# Patient Record
Sex: FEMALE | Race: WHITE | NOT HISPANIC OR LATINO | Employment: OTHER | URBAN - METROPOLITAN AREA
[De-identification: names, ages, dates, MRNs, and addresses within clinical notes are randomized per-mention and may not be internally consistent; named-entity substitution may affect disease eponyms.]

---

## 2017-02-24 ENCOUNTER — COMMUNICATION - HEALTHEAST (OUTPATIENT)
Dept: NURSING | Facility: CLINIC | Age: 82
End: 2017-02-24

## 2017-04-24 ENCOUNTER — COMMUNICATION - HEALTHEAST (OUTPATIENT)
Dept: NURSING | Facility: CLINIC | Age: 82
End: 2017-04-24

## 2017-05-11 ENCOUNTER — COMMUNICATION - HEALTHEAST (OUTPATIENT)
Dept: SCHEDULING | Facility: CLINIC | Age: 82
End: 2017-05-11

## 2017-05-12 ENCOUNTER — OFFICE VISIT - HEALTHEAST (OUTPATIENT)
Dept: FAMILY MEDICINE | Facility: CLINIC | Age: 82
End: 2017-05-12

## 2017-05-12 DIAGNOSIS — F32.A DEPRESSION: ICD-10-CM

## 2017-05-12 DIAGNOSIS — I10 ESSENTIAL HYPERTENSION WITH GOAL BLOOD PRESSURE LESS THAN 140/90: ICD-10-CM

## 2017-05-12 ASSESSMENT — MIFFLIN-ST. JEOR: SCORE: 1112.15

## 2017-06-05 ENCOUNTER — COMMUNICATION - HEALTHEAST (OUTPATIENT)
Dept: NURSING | Facility: CLINIC | Age: 82
End: 2017-06-05

## 2017-06-13 ENCOUNTER — COMMUNICATION - HEALTHEAST (OUTPATIENT)
Dept: FAMILY MEDICINE | Facility: CLINIC | Age: 82
End: 2017-06-13

## 2017-06-26 ENCOUNTER — COMMUNICATION - HEALTHEAST (OUTPATIENT)
Dept: FAMILY MEDICINE | Facility: CLINIC | Age: 82
End: 2017-06-26

## 2017-06-26 ENCOUNTER — RECORDS - HEALTHEAST (OUTPATIENT)
Dept: ADMINISTRATIVE | Facility: OTHER | Age: 82
End: 2017-06-26

## 2017-06-26 DIAGNOSIS — G47.00 INSOMNIA, UNSPECIFIED: ICD-10-CM

## 2017-07-04 ENCOUNTER — COMMUNICATION - HEALTHEAST (OUTPATIENT)
Dept: SCHEDULING | Facility: CLINIC | Age: 82
End: 2017-07-04

## 2017-07-04 DIAGNOSIS — I10 HTN (HYPERTENSION): ICD-10-CM

## 2017-07-19 ENCOUNTER — COMMUNICATION - HEALTHEAST (OUTPATIENT)
Dept: NURSING | Facility: CLINIC | Age: 82
End: 2017-07-19

## 2017-07-31 ENCOUNTER — RECORDS - HEALTHEAST (OUTPATIENT)
Dept: ADMINISTRATIVE | Facility: OTHER | Age: 82
End: 2017-07-31

## 2017-09-06 ENCOUNTER — COMMUNICATION - HEALTHEAST (OUTPATIENT)
Dept: NURSING | Facility: CLINIC | Age: 82
End: 2017-09-06

## 2017-09-14 ENCOUNTER — COMMUNICATION - HEALTHEAST (OUTPATIENT)
Dept: FAMILY MEDICINE | Facility: CLINIC | Age: 82
End: 2017-09-14

## 2017-09-14 DIAGNOSIS — G47.00 INSOMNIA, UNSPECIFIED: ICD-10-CM

## 2017-09-24 ENCOUNTER — COMMUNICATION - HEALTHEAST (OUTPATIENT)
Dept: FAMILY MEDICINE | Facility: CLINIC | Age: 82
End: 2017-09-24

## 2017-09-24 DIAGNOSIS — K21.00 REFLUX ESOPHAGITIS: ICD-10-CM

## 2017-09-28 ENCOUNTER — COMMUNICATION - HEALTHEAST (OUTPATIENT)
Dept: NURSING | Facility: CLINIC | Age: 82
End: 2017-09-28

## 2017-10-09 ENCOUNTER — OFFICE VISIT - HEALTHEAST (OUTPATIENT)
Dept: FAMILY MEDICINE | Facility: CLINIC | Age: 82
End: 2017-10-09

## 2017-10-09 DIAGNOSIS — K21.00 REFLUX ESOPHAGITIS: ICD-10-CM

## 2017-10-09 DIAGNOSIS — M81.0 OSTEOPOROSIS: ICD-10-CM

## 2017-10-09 DIAGNOSIS — F33.1 MAJOR DEPRESSIVE DISORDER, RECURRENT EPISODE, MODERATE (H): ICD-10-CM

## 2017-10-09 DIAGNOSIS — C50.919 MALIGNANT NEOPLASM OF FEMALE BREAST (H): ICD-10-CM

## 2017-10-09 DIAGNOSIS — F51.01 PRIMARY INSOMNIA: ICD-10-CM

## 2017-10-09 DIAGNOSIS — H61.20 CERUMEN IMPACTION: ICD-10-CM

## 2017-10-09 DIAGNOSIS — Z00.00 ROUTINE GENERAL MEDICAL EXAMINATION AT A HEALTH CARE FACILITY: ICD-10-CM

## 2017-10-09 DIAGNOSIS — I10 ESSENTIAL HYPERTENSION: ICD-10-CM

## 2017-10-09 LAB
CHOLEST SERPL-MCNC: 184 MG/DL
FASTING STATUS PATIENT QL REPORTED: YES
HDLC SERPL-MCNC: 32 MG/DL
LDLC SERPL CALC-MCNC: 67 MG/DL
LDLC SERPL CALC-MCNC: ABNORMAL MG/DL
TRIGL SERPL-MCNC: 458 MG/DL

## 2017-10-09 ASSESSMENT — MIFFLIN-ST. JEOR: SCORE: 1098.54

## 2017-10-23 ENCOUNTER — COMMUNICATION - HEALTHEAST (OUTPATIENT)
Dept: NURSING | Facility: CLINIC | Age: 82
End: 2017-10-23

## 2017-11-20 ENCOUNTER — COMMUNICATION - HEALTHEAST (OUTPATIENT)
Dept: FAMILY MEDICINE | Facility: CLINIC | Age: 82
End: 2017-11-20

## 2017-11-27 ENCOUNTER — COMMUNICATION - HEALTHEAST (OUTPATIENT)
Dept: NURSING | Facility: CLINIC | Age: 82
End: 2017-11-27

## 2017-11-28 ENCOUNTER — AMBULATORY - HEALTHEAST (OUTPATIENT)
Dept: CARE COORDINATION | Facility: CLINIC | Age: 82
End: 2017-11-28

## 2017-11-28 ENCOUNTER — COMMUNICATION - HEALTHEAST (OUTPATIENT)
Dept: TELEHEALTH | Facility: CLINIC | Age: 82
End: 2017-11-28

## 2017-11-28 ENCOUNTER — COMMUNICATION - HEALTHEAST (OUTPATIENT)
Dept: CARE COORDINATION | Facility: CLINIC | Age: 82
End: 2017-11-28

## 2017-12-01 ENCOUNTER — COMMUNICATION - HEALTHEAST (OUTPATIENT)
Dept: FAMILY MEDICINE | Facility: CLINIC | Age: 82
End: 2017-12-01

## 2017-12-05 ENCOUNTER — COMMUNICATION - HEALTHEAST (OUTPATIENT)
Dept: FAMILY MEDICINE | Facility: CLINIC | Age: 82
End: 2017-12-05

## 2017-12-12 ENCOUNTER — COMMUNICATION - HEALTHEAST (OUTPATIENT)
Dept: FAMILY MEDICINE | Facility: CLINIC | Age: 82
End: 2017-12-12

## 2017-12-14 ENCOUNTER — RECORDS - HEALTHEAST (OUTPATIENT)
Dept: ADMINISTRATIVE | Facility: OTHER | Age: 82
End: 2017-12-14

## 2017-12-14 ENCOUNTER — COMMUNICATION - HEALTHEAST (OUTPATIENT)
Dept: FAMILY MEDICINE | Facility: CLINIC | Age: 82
End: 2017-12-14

## 2017-12-27 ENCOUNTER — COMMUNICATION - HEALTHEAST (OUTPATIENT)
Dept: SCHEDULING | Facility: CLINIC | Age: 82
End: 2017-12-27

## 2017-12-27 ENCOUNTER — HOSPITAL ENCOUNTER (EMERGENCY)
Facility: CLINIC | Age: 82
Discharge: HOME | End: 2017-12-27
Attending: EMERGENCY MEDICINE | Admitting: EMERGENCY MEDICINE
Payer: COMMERCIAL

## 2017-12-27 ENCOUNTER — RECORDS - HEALTHEAST (OUTPATIENT)
Dept: ADMINISTRATIVE | Facility: OTHER | Age: 82
End: 2017-12-27

## 2017-12-27 ENCOUNTER — APPOINTMENT (OUTPATIENT)
Dept: GENERAL RADIOLOGY | Facility: CLINIC | Age: 82
End: 2017-12-27
Attending: EMERGENCY MEDICINE
Payer: COMMERCIAL

## 2017-12-27 VITALS
SYSTOLIC BLOOD PRESSURE: 153 MMHG | RESPIRATION RATE: 20 BRPM | DIASTOLIC BLOOD PRESSURE: 71 MMHG | HEIGHT: 64 IN | TEMPERATURE: 98 F | OXYGEN SATURATION: 96 % | WEIGHT: 165 LBS | BODY MASS INDEX: 28.17 KG/M2

## 2017-12-27 DIAGNOSIS — J20.9 ACUTE BRONCHITIS, UNSPECIFIED ORGANISM: ICD-10-CM

## 2017-12-27 LAB
ALBUMIN SERPL-MCNC: 3.3 G/DL (ref 3.4–5)
ALP SERPL-CCNC: 62 U/L (ref 40–150)
ALT SERPL W P-5'-P-CCNC: 13 U/L (ref 0–50)
ANION GAP SERPL CALCULATED.3IONS-SCNC: 5 MMOL/L (ref 3–14)
AST SERPL W P-5'-P-CCNC: 22 U/L (ref 0–45)
BASE EXCESS BLDV CALC-SCNC: 4.5 MMOL/L
BASOPHILS # BLD AUTO: 0 10E9/L (ref 0–0.2)
BASOPHILS NFR BLD AUTO: 0.3 %
BILIRUB SERPL-MCNC: 0.6 MG/DL (ref 0.2–1.3)
BUN SERPL-MCNC: 13 MG/DL (ref 7–30)
CALCIUM SERPL-MCNC: 8.3 MG/DL (ref 8.5–10.1)
CHLORIDE SERPL-SCNC: 107 MMOL/L (ref 94–109)
CO2 SERPL-SCNC: 28 MMOL/L (ref 20–32)
CREAT SERPL-MCNC: 0.49 MG/DL (ref 0.52–1.04)
DIFFERENTIAL METHOD BLD: ABNORMAL
EOSINOPHIL # BLD AUTO: 0 10E9/L (ref 0–0.7)
EOSINOPHIL NFR BLD AUTO: 0.6 %
ERYTHROCYTE [DISTWIDTH] IN BLOOD BY AUTOMATED COUNT: 13.2 % (ref 10–15)
FLUAV+FLUBV AG SPEC QL: NEGATIVE
FLUAV+FLUBV AG SPEC QL: NEGATIVE
GFR SERPL CREATININE-BSD FRML MDRD: >90 ML/MIN/1.7M2
GLUCOSE SERPL-MCNC: 97 MG/DL (ref 70–99)
HCO3 BLDV-SCNC: 29 MMOL/L (ref 21–28)
HCT VFR BLD AUTO: 40.9 % (ref 35–47)
HGB BLD-MCNC: 13.4 G/DL (ref 11.7–15.7)
IMM GRANULOCYTES # BLD: 0 10E9/L (ref 0–0.4)
IMM GRANULOCYTES NFR BLD: 0.3 %
LACTATE BLD-SCNC: 0.9 MMOL/L (ref 0.7–2)
LYMPHOCYTES # BLD AUTO: 1.5 10E9/L (ref 0.8–5.3)
LYMPHOCYTES NFR BLD AUTO: 42.4 %
MCH RBC QN AUTO: 31 PG (ref 26.5–33)
MCHC RBC AUTO-ENTMCNC: 32.8 G/DL (ref 31.5–36.5)
MCV RBC AUTO: 95 FL (ref 78–100)
MONOCYTES # BLD AUTO: 0.6 10E9/L (ref 0–1.3)
MONOCYTES NFR BLD AUTO: 17.9 %
NEUTROPHILS # BLD AUTO: 1.3 10E9/L (ref 1.6–8.3)
NEUTROPHILS NFR BLD AUTO: 38.5 %
NT-PROBNP SERPL-MCNC: 186 PG/ML (ref 0–1800)
O2/TOTAL GAS SETTING VFR VENT: ABNORMAL %
PCO2 BLDV: 41 MM HG (ref 40–50)
PH BLDV: 7.45 PH (ref 7.32–7.43)
PLATELET # BLD AUTO: 119 10E9/L (ref 150–450)
PO2 BLDV: 35 MM HG (ref 25–47)
POTASSIUM SERPL-SCNC: 3.8 MMOL/L (ref 3.4–5.3)
PROT SERPL-MCNC: 6.6 G/DL (ref 6.8–8.8)
RBC # BLD AUTO: 4.32 10E12/L (ref 3.8–5.2)
SODIUM SERPL-SCNC: 140 MMOL/L (ref 133–144)
SPECIMEN SOURCE: NORMAL
TROPONIN I SERPL-MCNC: <0.015 UG/L (ref 0–0.04)
WBC # BLD AUTO: 3.5 10E9/L (ref 4–11)

## 2017-12-27 PROCEDURE — 80053 COMPREHEN METABOLIC PANEL: CPT | Performed by: EMERGENCY MEDICINE

## 2017-12-27 PROCEDURE — 96360 HYDRATION IV INFUSION INIT: CPT | Performed by: EMERGENCY MEDICINE

## 2017-12-27 PROCEDURE — 83605 ASSAY OF LACTIC ACID: CPT | Performed by: EMERGENCY MEDICINE

## 2017-12-27 PROCEDURE — 25000125 ZZHC RX 250: Performed by: EMERGENCY MEDICINE

## 2017-12-27 PROCEDURE — 93005 ELECTROCARDIOGRAM TRACING: CPT | Performed by: EMERGENCY MEDICINE

## 2017-12-27 PROCEDURE — 99285 EMERGENCY DEPT VISIT HI MDM: CPT | Mod: 25 | Performed by: EMERGENCY MEDICINE

## 2017-12-27 PROCEDURE — 87804 INFLUENZA ASSAY W/OPTIC: CPT | Mod: 91 | Performed by: EMERGENCY MEDICINE

## 2017-12-27 PROCEDURE — 82803 BLOOD GASES ANY COMBINATION: CPT | Performed by: EMERGENCY MEDICINE

## 2017-12-27 PROCEDURE — 71020 XR CHEST 2 VW: CPT

## 2017-12-27 PROCEDURE — 25000128 H RX IP 250 OP 636: Performed by: EMERGENCY MEDICINE

## 2017-12-27 PROCEDURE — 85025 COMPLETE CBC W/AUTO DIFF WBC: CPT | Performed by: EMERGENCY MEDICINE

## 2017-12-27 PROCEDURE — 93010 ELECTROCARDIOGRAM REPORT: CPT | Mod: Z6 | Performed by: EMERGENCY MEDICINE

## 2017-12-27 PROCEDURE — 83880 ASSAY OF NATRIURETIC PEPTIDE: CPT | Performed by: EMERGENCY MEDICINE

## 2017-12-27 PROCEDURE — 99284 EMERGENCY DEPT VISIT MOD MDM: CPT | Mod: 25 | Performed by: EMERGENCY MEDICINE

## 2017-12-27 PROCEDURE — 84484 ASSAY OF TROPONIN QUANT: CPT | Performed by: EMERGENCY MEDICINE

## 2017-12-27 PROCEDURE — 96361 HYDRATE IV INFUSION ADD-ON: CPT | Performed by: EMERGENCY MEDICINE

## 2017-12-27 RX ORDER — AZITHROMYCIN 250 MG/1
TABLET, FILM COATED ORAL
Qty: 6 TABLET | Refills: 0 | Status: SHIPPED | OUTPATIENT
Start: 2017-12-27 | End: 2018-01-01

## 2017-12-27 RX ORDER — SODIUM CHLORIDE, SODIUM LACTATE, POTASSIUM CHLORIDE, CALCIUM CHLORIDE 600; 310; 30; 20 MG/100ML; MG/100ML; MG/100ML; MG/100ML
1000 INJECTION, SOLUTION INTRAVENOUS CONTINUOUS
Status: DISCONTINUED | OUTPATIENT
Start: 2017-12-27 | End: 2017-12-27 | Stop reason: HOSPADM

## 2017-12-27 RX ORDER — ALBUTEROL SULFATE 90 UG/1
2 AEROSOL, METERED RESPIRATORY (INHALATION) EVERY 4 HOURS PRN
Qty: 1 INHALER | Refills: 0 | Status: SHIPPED | OUTPATIENT
Start: 2017-12-27 | End: 2018-11-26

## 2017-12-27 RX ORDER — IPRATROPIUM BROMIDE AND ALBUTEROL SULFATE 2.5; .5 MG/3ML; MG/3ML
3 SOLUTION RESPIRATORY (INHALATION) ONCE
Status: COMPLETED | OUTPATIENT
Start: 2017-12-27 | End: 2017-12-27

## 2017-12-27 RX ORDER — PREDNISONE 20 MG/1
40 TABLET ORAL DAILY
Qty: 10 TABLET | Refills: 0 | Status: SHIPPED | OUTPATIENT
Start: 2017-12-27 | End: 2018-01-01

## 2017-12-27 RX ADMIN — SODIUM CHLORIDE, POTASSIUM CHLORIDE, SODIUM LACTATE AND CALCIUM CHLORIDE 500 ML: 600; 310; 30; 20 INJECTION, SOLUTION INTRAVENOUS at 11:36

## 2017-12-27 RX ADMIN — IPRATROPIUM BROMIDE AND ALBUTEROL SULFATE 3 ML: .5; 3 SOLUTION RESPIRATORY (INHALATION) at 15:27

## 2017-12-27 RX ADMIN — SODIUM CHLORIDE, POTASSIUM CHLORIDE, SODIUM LACTATE AND CALCIUM CHLORIDE 1000 ML: 600; 310; 30; 20 INJECTION, SOLUTION INTRAVENOUS at 12:42

## 2017-12-27 NOTE — ED AVS SNAPSHOT
[facility] Emergency Department    [address] [address]    Phone:  [phone]    Fax:  [phone]                                       Nimo Concepcion   MRN: [id number]    Department:  [facility] Emergency Department   Date of Visit:  12/27/2017           After Visit Summary Signature Page     I have received my discharge instructions, and my questions have been answered. I have discussed any challenges I see with this plan with the nurse or doctor.    ..........................................................................................................................................  Patient/Patient Representative Signature      ..........................................................................................................................................  Patient Representative Print Name and Relationship to Patient    ..................................................               ................................................  Date                                            Time    ..........................................................................................................................................  Reviewed by Signature/Title    ...................................................              ..............................................  Date                                                            Time

## 2017-12-27 NOTE — DISCHARGE INSTRUCTIONS
Bronchitis, Antibiotic Treatment (Adult)    Bronchitis is an infection of the air passages (bronchial tubes) in your lungs. It often occurs when you have a cold. This illness is contagious during the first few days and is spread through the air by coughing and sneezing, or by direct contact (touching the sick person and then touching your own eyes, nose, or mouth).  Symptoms of bronchitis include cough with mucus (phlegm) and low-grade fever. Bronchitis usually lasts 7 to 14 days. Mild cases can be treated with simple home remedies. More severe infection is treated with an antibiotic.  Home care  Follow these guidelines when caring for yourself at home:    If your symptoms are severe, rest at home for the first 2 to 3 days. When you go back to your usual activities, don't let yourself get too tired.    Do not smoke. Also avoid being exposed to secondhand smoke.    You may use over-the-counter medicines to control fever or pain, unless another medicine was prescribed. (Note: If you have chronic liver or kidney disease or have ever had a stomach ulcer or gastrointestinal bleeding, talk with your healthcare provider before using these medicines. Also talk to your provider if you are taking medicine to prevent blood clots.) Aspirin should never be given to anyone younger than 18 years of age who is ill with a viral infection or fever. It may cause severe liver or brain damage.    Your appetite may be poor, so a light diet is fine. Avoid dehydration by drinking 6 to 8 glasses of fluids per day (such as water, soft drinks, sports drinks, juices, tea, or soup). Extra fluids will help loosen secretions in the nose and lungs.    Over-the-counter cough, cold, and sore-throat medicines will not shorten the length of the illness, but they may be helpful to reduce symptoms. (Note: Do not use decongestants if you have high blood pressure.)    Finish all antibiotic medicine. Do this even if you are feeling better after only a  few days.  Follow-up care  Follow up with your healthcare provider, or as advised. If you had an X-ray or ECG (electrocardiogram), a specialist will review it. You will be notified of any new findings that may affect your care.  Note: If you are age 65 or older, or if you have a chronic lung disease or condition that affects your immune system, or you smoke, talk to your healthcare provider about having pneumococcal vaccinations and a yearly influenza vaccination (flu shot).  When to seek medical advice  Call your healthcare provider right away if any of these occur:    Fever of 100.4 F (38 C) or higher    Coughing up increased amounts of colored sputum    Weakness, drowsiness, headache, facial pain, ear pain, or a stiff neck  Call 911, or get immediate medical care  Contact emergency services right away if any of these occur.    Coughing up blood    Worsening weakness, drowsiness, headache, or stiff neck    Trouble breathing, wheezing, or pain with breathing  Date Last Reviewed: 9/13/2015 2000-2017 [employer]. [address] [place] [address]. All rights reserved. This information is not intended as a substitute for professional medical care. Always follow your healthcare professional's instructions.          Bronchitis with Wheezing (Viral or Bacterial: Adult)    Bronchitis is an infection of the air passages. It often occurs during a cold and is usually caused by a virus. Symptoms include cough with mucus (phlegm) and low-grade fever. This illness is contagious during the first few days and is spread through the air by coughing and sneezing, or by direct contact (touching the sick person and then touching your own eyes, nose, or mouth).  If there is a lot of inflammation, air flow is restricted. The air passages may also go into spasm, especially if you have asthma. This causes wheezing and difficulty breathing even in people who do not have asthma.  Bronchitis usually lasts 7 to 14  days. The wheezing should improve with treatment during the first week. An inhaler is often prescribed to relax the air passages and stop wheezing. Antibiotics will be prescribed if your doctor thinks there is also a secondary bacterial infection.  Home care    If symptoms are severe, rest at home for the first 2 to 3 days. When you go back to your usual activities, don't let yourself get too tired.    Do not smoke. Also avoid being exposed to secondhand smoke.    You may use over-the-counter medicine to control fever or pain, unless another medicine was prescribed. Note: If you have chronic liver or kidney disease or have ever had a stomach ulcer or gastrointestinal bleeding, talk with your healthcare provider before using these medicines. Also talk to your provider if you are taking medicine to prevent blood clots.) Aspirin should never be given to anyone younger than 18 years of age who is ill with a viral infection or fever. It may cause severe liver or brain damage.    Your appetite may be poor, so a light diet is fine. Avoid dehydration by drinking 6 to 8 glasses of fluids per day (such as water, soft drinks, sports drinks, juices, tea, or soup). Extra fluids will help loosen secretions in the nose and lungs.    Over-the-counter cough, cold, and sore-throat medicines will not shorten the length of the illness, but they may be helpful to reduce symptoms. (Note: Do not use decongestants if you have high blood pressure.)    If you were given an inhaler, use it exactly as directed. If you need to use it more often than prescribed, your condition may be worsening. If this happens, contact your healthcare provider.    If prescribed, finish all antibiotic medicine, even if you are feeling better after only a few days.  Follow-up care  Follow up with your healthcare provider, or as advised. If you had an X-ray or ECG (electrocardiogram), a specialist will review it. You will be notified of any new findings that may  affect your care.  Note: If you are age 65 or older, or if you have a chronic lung disease or condition that affects your immune system, or you smoke, talk to your healthcare provider about having a pneumococcal vaccinations and a yearly influenza vaccination (flu shot).  When to seek medical advice  Call your healthcare provider right away if any of these occur:    Fever of 100.4 F (38 C) or higher    Coughing up increasing amounts of colored sputum    Weakness, drowsiness, headache, facial pain, ear pain, or a stiff neck  Call 911, or get immediate medical care  Contact emergency services right away if any of these occur.    Coughing up blood    Worsening weakness, drowsiness, headache, or stiff neck    Increased wheezing not helped with medication, shortness of breath, or pain with breathing  Date Last Reviewed: 9/13/2015 2000-2017 [employer]. [address] [place] [id number]. All rights reserved. This information is not intended as a substitute for professional medical care. Always follow your healthcare professional's instructions.

## 2017-12-27 NOTE — ED AVS SNAPSHOT
[facility] Emergency Department    [address]    [facility] [address]    Phone:  [phone]    Fax:  [phone]                                       Nimo Concepcion   MRN: [id number]    Department:  [facility] Emergency Department   Date of Visit:  12/27/2017           Patient Information     Date Of Birth          4/11/1930        Your diagnoses for this visit were:     Acute bronchitis, unspecified organism        You were seen by Christopher Palafox MD.      Follow-up Information     Follow up with Katarina Colon    Specialty:  Family Practice    Why:  3-5 days    Contact information:    [facility]  [address] [facility] [address]  [facility] [address]  [phone]          Discharge Instructions         Bronchitis, Antibiotic Treatment (Adult)    Bronchitis is an infection of the air passages (bronchial tubes) in your lungs. It often occurs when you have a cold. This illness is contagious during the first few days and is spread through the air by coughing and sneezing, or by direct contact (touching the sick person and then touching your own eyes, nose, or mouth).  Symptoms of bronchitis include cough with mucus (phlegm) and low-grade fever. Bronchitis usually lasts 7 to 14 days. Mild cases can be treated with simple home remedies. More severe infection is treated with an antibiotic.  Home care  Follow these guidelines when caring for yourself at home:    If your symptoms are severe, rest at home for the first 2 to 3 days. When you go back to your usual activities, don't let yourself get too tired.    Do not smoke. Also avoid being exposed to secondhand smoke.    You may use over-the-counter medicines to control fever or pain, unless another medicine was prescribed. (Note: If you have chronic liver or kidney disease or have ever had a stomach ulcer or gastrointestinal bleeding, talk with your healthcare provider before using these medicines. Also talk to your provider if you are taking medicine  to prevent blood clots.) Aspirin should never be given to anyone younger than 18 years of age who is ill with a viral infection or fever. It may cause severe liver or brain damage.    Your appetite may be poor, so a light diet is fine. Avoid dehydration by drinking 6 to 8 glasses of fluids per day (such as water, soft drinks, sports drinks, juices, tea, or soup). Extra fluids will help loosen secretions in the nose and lungs.    Over-the-counter cough, cold, and sore-throat medicines will not shorten the length of the illness, but they may be helpful to reduce symptoms. (Note: Do not use decongestants if you have high blood pressure.)    Finish all antibiotic medicine. Do this even if you are feeling better after only a few days.  Follow-up care  Follow up with your healthcare provider, or as advised. If you had an X-ray or ECG (electrocardiogram), a specialist will review it. You will be notified of any new findings that may affect your care.  Note: If you are age 65 or older, or if you have a chronic lung disease or condition that affects your immune system, or you smoke, talk to your healthcare provider about having pneumococcal vaccinations and a yearly influenza vaccination (flu shot).  When to seek medical advice  Call your healthcare provider right away if any of these occur:    Fever of 100.4 F (38 C) or higher    Coughing up increased amounts of colored sputum    Weakness, drowsiness, headache, facial pain, ear pain, or a stiff neck  Call 911, or get immediate medical care  Contact emergency services right away if any of these occur.    Coughing up blood    Worsening weakness, drowsiness, headache, or stiff neck    Trouble breathing, wheezing, or pain with breathing  Date Last Reviewed: 9/13/2015 2000-2017 [employer]. [address] [place] [address], [place] [address]. All rights reserved. This information is not intended as a substitute for professional medical care. Always follow your healthcare  professional's instructions.          Bronchitis with Wheezing (Viral or Bacterial: Adult)    Bronchitis is an infection of the air passages. It often occurs during a cold and is usually caused by a virus. Symptoms include cough with mucus (phlegm) and low-grade fever. This illness is contagious during the first few days and is spread through the air by coughing and sneezing, or by direct contact (touching the sick person and then touching your own eyes, nose, or mouth).  If there is a lot of inflammation, air flow is restricted. The air passages may also go into spasm, especially if you have asthma. This causes wheezing and difficulty breathing even in people who do not have asthma.  Bronchitis usually lasts 7 to 14 days. The wheezing should improve with treatment during the first week. An inhaler is often prescribed to relax the air passages and stop wheezing. Antibiotics will be prescribed if your doctor thinks there is also a secondary bacterial infection.  Home care    If symptoms are severe, rest at home for the first 2 to 3 days. When you go back to your usual activities, don't let yourself get too tired.    Do not smoke. Also avoid being exposed to secondhand smoke.    You may use over-the-counter medicine to control fever or pain, unless another medicine was prescribed. Note: If you have chronic liver or kidney disease or have ever had a stomach ulcer or gastrointestinal bleeding, talk with your healthcare provider before using these medicines. Also talk to your provider if you are taking medicine to prevent blood clots.) Aspirin should never be given to anyone younger than 18 years of age who is ill with a viral infection or fever. It may cause severe liver or brain damage.    Your appetite may be poor, so a light diet is fine. Avoid dehydration by drinking 6 to 8 glasses of fluids per day (such as water, soft drinks, sports drinks, juices, tea, or soup). Extra fluids will help loosen secretions in the  nose and lungs.    Over-the-counter cough, cold, and sore-throat medicines will not shorten the length of the illness, but they may be helpful to reduce symptoms. (Note: Do not use decongestants if you have high blood pressure.)    If you were given an inhaler, use it exactly as directed. If you need to use it more often than prescribed, your condition may be worsening. If this happens, contact your healthcare provider.    If prescribed, finish all antibiotic medicine, even if you are feeling better after only a few days.  Follow-up care  Follow up with your healthcare provider, or as advised. If you had an X-ray or ECG (electrocardiogram), a specialist will review it. You will be notified of any new findings that may affect your care.  Note: If you are age 65 or older, or if you have a chronic lung disease or condition that affects your immune system, or you smoke, talk to your healthcare provider about having a pneumococcal vaccinations and a yearly influenza vaccination (flu shot).  When to seek medical advice  Call your healthcare provider right away if any of these occur:    Fever of 100.4 F (38 C) or higher    Coughing up increasing amounts of colored sputum    Weakness, drowsiness, headache, facial pain, ear pain, or a stiff neck  Call 911, or get immediate medical care  Contact emergency services right away if any of these occur.    Coughing up blood    Worsening weakness, drowsiness, headache, or stiff neck    Increased wheezing not helped with medication, shortness of breath, or pain with breathing  Date Last Reviewed: 9/13/2015 2000-2017 [employer]. [address] [place] [address], [place] [address]. All rights reserved. This information is not intended as a substitute for professional medical care. Always follow your healthcare professional's instructions.          24 Hour Appointment Hotline       To make an appointment at any [facility], call [phone] ([phone]). If you don't  have a family doctor or clinic, we will help you find one. [facility] are conveniently located to serve the needs of you and your family.             Review of your medicines      START taking        Dose / Directions Last dose taken    albuterol 108 (90 BASE) MCG/ACT Inhaler   Commonly known as:  PROAIR HFA/PROVENTIL HFA/VENTOLIN HFA   Dose:  2 puff   Quantity:  1 Inhaler        Inhale 2 puffs into the lungs every 4 hours as needed for shortness of breath / dyspnea or wheezing   Refills:  0        azithromycin 250 MG tablet   Commonly known as:  ZITHROMAX Z-JOHNNY   Quantity:  6 tablet        Two tablets on the first day, then one tablet daily for the next 4 days   Refills:  0        predniSONE 20 MG tablet   Commonly known as:  DELTASONE   Dose:  40 mg   Quantity:  10 tablet        Take 2 tablets (40 mg) by mouth daily for 5 days   Refills:  0          Our records show that you are taking the medicines listed below. If these are incorrect, please call your family doctor or clinic.        Dose / Directions Last dose taken    BUPROPION HBR ER PO   Dose:  150 mg        Take 150 mg by mouth daily   Refills:  0        CITALOPRAM HYDROBROMIDE PO   Dose:  40 mg        Take 40 mg by mouth daily   Refills:  0        POTASSIUM CHLORIDE ER PO   Dose:  30 mEq        Take 30 mEq by mouth daily Three tabs daily (total dose 30mEq)   Refills:  0        PROTONIX PO   Dose:  40 mg        Take 40 mg by mouth every morning (before breakfast)   Refills:  0        TRAZODONE HCL PO   Dose:  100-200 mg        Take 100-200 mg by mouth At Bedtime   Refills:  0        VERAPAMIL HCL ER PO 24HR CAPSULE   Dose:  100 mg        Take 100 mg by mouth daily   Refills:  0                Prescriptions were sent or printed at these locations (3 Prescriptions)                   [facility] [place], [place] - [address]   [address] [facility] [address]    Telephone:  [phone]   Fax:  [phone]   Hours:                   E-Prescribed (3 of 3)         predniSONE (DELTASONE) 20 MG tablet               azithromycin (ZITHROMAX Z-JOHNNY) 250 MG tablet               albuterol (PROAIR HFA/PROVENTIL HFA/VENTOLIN HFA) 108 (90 BASE) MCG/ACT Inhaler                Procedures and tests performed during your visit     Blood gas venous    CBC with platelets differential    Comprehensive metabolic panel    EKG 12-lead, tracing only    Influenza A/B antigen    Lactic acid whole blood    Nt probnp inpatient (BNP)    Peripheral IV catheter    Troponin I    XR Chest 2 Views      Orders Needing Specimen Collection     None      Pending Results     Date and Time Order Name Status Description    12/27/2017 1120 XR Chest 2 Views Preliminary             Pending Culture Results     No orders found from 12/25/2017 to 12/28/2017.            Pending Results Instructions     If you had any lab results that were not finalized at the time of your Discharge, you can call the ED Lab Result RN at [phone]. You will be contacted by this team for any positive Lab results or changes in treatment. The nurses are available 7 days a week from 10A to 6:30P.  You can leave a message 24 hours per day and they will return your call.        Test Results From Your Hospital Stay        12/27/2017 11:46 AM      Component Results     Component Value Ref Range & Units Status    WBC 3.5 (L) 4.0 - 11.0 10e9/L Final    RBC Count 4.32 3.8 - 5.2 10e12/L Final    Hemoglobin 13.4 11.7 - 15.7 g/dL Final    Hematocrit 40.9 35.0 - 47.0 % Final    MCV 95 78 - 100 fl Final    MCH 31.0 26.5 - 33.0 pg Final    MCHC 32.8 31.5 - 36.5 g/dL Final    RDW 13.2 10.0 - 15.0 % Final    Platelet Count 119 (L) 150 - 450 10e9/L Final    Diff Method Automated Method  Final    % Neutrophils 38.5 % Final    % Lymphocytes 42.4 % Final    % Monocytes 17.9 % Final    % Eosinophils 0.6 % Final    % Basophils 0.3 % Final    % Immature Granulocytes 0.3 % Final    Absolute Neutrophil 1.3 (L) 1.6 - 8.3 10e9/L Final     Absolute Lymphocytes 1.5 0.8 - 5.3 10e9/L Final    Absolute Monocytes 0.6 0.0 - 1.3 10e9/L Final    Absolute Eosinophils 0.0 0.0 - 0.7 10e9/L Final    Absolute Basophils 0.0 0.0 - 0.2 10e9/L Final    Abs Immature Granulocytes 0.0 0 - 0.4 10e9/L Final         12/27/2017 12:05 PM      Component Results     Component Value Ref Range & Units Status    Sodium 140 133 - 144 mmol/L Final    Potassium 3.8 3.4 - 5.3 mmol/L Final    Chloride 107 94 - 109 mmol/L Final    Carbon Dioxide 28 20 - 32 mmol/L Final    Anion Gap 5 3 - 14 mmol/L Final    Glucose 97 70 - 99 mg/dL Final    Urea Nitrogen 13 7 - 30 mg/dL Final    Creatinine 0.49 (L) 0.52 - 1.04 mg/dL Final    GFR Estimate >90 >60 mL/min/1.7m2 Final    Non  GFR Calc    GFR Estimate If Black >90 >60 mL/min/1.7m2 Final    African American GFR Calc    Calcium 8.3 (L) 8.5 - 10.1 mg/dL Final    Bilirubin Total 0.6 0.2 - 1.3 mg/dL Final    Albumin 3.3 (L) 3.4 - 5.0 g/dL Final    Protein Total 6.6 (L) 6.8 - 8.8 g/dL Final    Alkaline Phosphatase 62 40 - 150 U/L Final    ALT 13 0 - 50 U/L Final    AST 22 0 - 45 U/L Final         12/27/2017 12:05 PM      Component Results     Component Value Ref Range & Units Status    Troponin I ES <0.015 0.000 - 0.045 ug/L Final    The 99th percentile for upper reference range is 0.045 ug/L.  Troponin values   in the range of 0.045 - 0.120 ug/L may be associated with risks of adverse   clinical events.           12/27/2017 11:55 AM      Component Results     Component Value Ref Range & Units Status    Lactic Acid 0.9 0.7 - 2.0 mmol/L Final         12/27/2017 12:05 PM      Component Results     Component Value Ref Range & Units Status    N-Terminal Pro BNP Inpatient 186 0 - 1800 pg/mL Final       Reference range shown and results flagged as abnormal are suggested inpatient   cut points for confirming diagnosis if CHF in an acute setting. Establishing a   baseline value for each individual patient is useful for follow-up. An    inpatient or emergency department NT-proPBNP <300 pg/mL effectively rules out   acute CHF, with 99% negative predictive value.  The outpatient non-acute reference range for ruling out CHF is:   0-125 pg/mL (age 18 to less than 75)   0-450 pg/mL (age 75 yrs and older)           12/27/2017 11:55 AM      Component Results     Component Value Ref Range & Units Status    Ph Venous 7.45 (H) 7.32 - 7.43 pH Final    PCO2 Venous 41 40 - 50 mm Hg Final    PO2 Venous 35 25 - 47 mm Hg Final    Bicarbonate Venous 29 (H) 21 - 28 mmol/L Final    Base Excess Venous 4.5 mmol/L Final    Abnormal Result, Ref range: -7.7 to 1.9    FIO2 REPORT AMENDED:  Final         12/27/2017 12:27 PM      Narrative     CHEST TWO VIEWS  12/27/2017 12:04 PM    HISTORY:  Cough.    COMPARISON:  None.        Impression     IMPRESSION:  No focal infiltrates. Hyperinflation suggesting  emphysematous changes. Probable linear scarring at the left lung base.  Mild aortic calcification. Degenerative changes of the spine.  Thoracolumbar scoliosis convex right.          12/27/2017  2:39 PM      Component Results     Component Value Ref Range & Units Status    Influenza A/B Agn Specimen Nasal  Final    Influenza A Negative NEG^Negative Final    Influenza B Negative NEG^Negative Final    Test results must be correlated with clinical data. If necessary, results   should be confirmed by a molecular assay or viral culture.                  Thank you for choosing [facility]       Thank you for choosing [facility] for your care. Our goal is always to provide you with excellent care. Hearing back from our patients is one way we can continue to improve our services. Please take a few minutes to complete the written survey that you may receive in the mail after you visit with us. Thank you!        [facility] Information     [employer] lets you send messages to your doctor, view your test results, renew your prescriptions, schedule appointments and more. To sign up, go to  [email or web address] . Click on \"Log in\" on the left side of the screen, which will take you to the Welcome page. Then click on \"Sign up Now\" on the right side of the page.     You will be asked to enter the access code listed below, as well as some personal information. Please follow the directions to create your username and password.     Your access code is: [email or web address]  Expires: 3/27/2018  3:40 PM     Your access code will  in 90 days. If you need help or a new code, please call your [facility] clinic or [phone].        Care EveryWhere ID     This is your Care EveryWhere ID. This could be used by other organizations to access your [facility] medical records  [id number]        Equal Access to Services     MILES LACY : Kyle Coronel, sonam hart, harvey kaalrebecca morgan, mishel kingsley . So [facility] [phone].    ATENCIÓN: Si habla español, tiene a quintana disposición servicios gratuitos de asistencia lingüística. Bharath al [phone].    We comply with applicable federal civil rights laws and [place] laws. We do not discriminate on the basis of race, color, national origin, age, disability, sex, sexual orientation, or gender identity.            After Visit Summary       This is your record. Keep this with you and show to your community pharmacist(s) and doctor(s) at your next visit.                  "

## 2017-12-27 NOTE — ED PROVIDER NOTES
Chief complaint cough    87-year-old female presents from home with daughter.  Reportedly has been coughing for the past week, describes dyspnea on exertion.  Coughing to the point of emesis at times.  Sputum is white.  Denies associated chest pain, nausea or vomiting.  She has had poor oral intake of both solids and liquids, although urinated once already this morning.  She did get flu vaccination this year.  She feels generalized weakness, orthostasis upon going from sitting to standing.  She denies headache, sore throat, abdominal pain, diarrhea, black or bloody stool, urinary symptoms or rash.  Patient is a non-smoker.    Past medical history, medications, allergies, social history, family history all reviewed.  Patient Active Problem List   Diagnosis     Fall at home     Fall     Adenocarcinoma Of The Breast     Overview:     Created by Conversion    Replacement Utility updated for latest IMO load     Chronic Reflux Esophagitis     Overview:     Created by Conversion       Primary insomnia     Overview:     Created by Conversion       Joint Pain, Localized In The Shoulder     Overview:     Created by Conversion       Osteopenia     Overview:     Created by Conversion    Replacement Utility updated for latest IMO load     Essential Hypercholesterolemia     Overview:     Created by Conversion       Moderate Recurrent Major Depression     Overview:     Created by Conversion       Osteoporosis     Overview:     Created by Conversion    Replacement Utility updated for latest IMO load     Lightheadedness     Overview:     Created by Conversion       Fatigue     Overview:     Created by Conversion       Vitamin D Deficiency     Overview:     Created by Conversion    Replacement Utility updated for latest IMO load     Shortness Of Breath     Overview:     Created by Conversion       Sciatica     Overview:     Created by Conversion       Localized Primary Osteoarthritis Of The Left Shoulder     Overview:     Created by  "Conversion    Replacement Utility updated for latest IMO load       ROS: All other systems reviewed and are negative.    /76  Temp 98  F (36.7  C) (Oral)  Resp 16  Ht 1.626 m (5' 4\")  Wt 74.8 kg (165 lb)  SpO2 94%  BMI 28.32 kg/m2  Nontoxic appearing no respiratory distress alert and oriented ×3  Head atraumatic normocephalic  TMs/EACs unremarkable, conjunctiva noninjected, oropharynx moist without lesions or erythema  No cervical adenopathy neck supple full active painless range of motion  Lungs clear to auscultation  Heart regular no murmur  Abdomen soft nontender bowel sounds positive no masses or HSM  Strength and sensation grossly intact throughout the extremities, gait and station normal  Speech is fluent, good eye contact, thought processes are rational  No lower extremity edema, tenderness, redness  Pedal pulses symmetrical and intact    EKG time 1150 symptoms cough, normal sinus rhythm rate 58, axis intervals within normal limits, no acute ST or T-wave changes, poor R-wave progression, unchanged from 5/21/14, read by Dr. Christopher Palafox    Results for orders placed or performed during the hospital encounter of 12/27/17   XR Chest 2 Views    Narrative    CHEST TWO VIEWS  12/27/2017 12:04 PM    HISTORY:  Cough.    COMPARISON:  None.      Impression    IMPRESSION:  No focal infiltrates. Hyperinflation suggesting  emphysematous changes. Probable linear scarring at the left lung base.  Mild aortic calcification. Degenerative changes of the spine.  Thoracolumbar scoliosis convex right.    CBC with platelets differential   Result Value Ref Range    WBC 3.5 (L) 4.0 - 11.0 10e9/L    RBC Count 4.32 3.8 - 5.2 10e12/L    Hemoglobin 13.4 11.7 - 15.7 g/dL    Hematocrit 40.9 35.0 - 47.0 %    MCV 95 78 - 100 fl    MCH 31.0 26.5 - 33.0 pg    MCHC 32.8 31.5 - 36.5 g/dL    RDW 13.2 10.0 - 15.0 %    Platelet Count 119 (L) 150 - 450 10e9/L    Diff Method Automated Method     % Neutrophils 38.5 %    % Lymphocytes 42.4 " %    % Monocytes 17.9 %    % Eosinophils 0.6 %    % Basophils 0.3 %    % Immature Granulocytes 0.3 %    Absolute Neutrophil 1.3 (L) 1.6 - 8.3 10e9/L    Absolute Lymphocytes 1.5 0.8 - 5.3 10e9/L    Absolute Monocytes 0.6 0.0 - 1.3 10e9/L    Absolute Eosinophils 0.0 0.0 - 0.7 10e9/L    Absolute Basophils 0.0 0.0 - 0.2 10e9/L    Abs Immature Granulocytes 0.0 0 - 0.4 10e9/L   Comprehensive metabolic panel   Result Value Ref Range    Sodium 140 133 - 144 mmol/L    Potassium 3.8 3.4 - 5.3 mmol/L    Chloride 107 94 - 109 mmol/L    Carbon Dioxide 28 20 - 32 mmol/L    Anion Gap 5 3 - 14 mmol/L    Glucose 97 70 - 99 mg/dL    Urea Nitrogen 13 7 - 30 mg/dL    Creatinine 0.49 (L) 0.52 - 1.04 mg/dL    GFR Estimate >90 >60 mL/min/1.7m2    GFR Estimate If Black >90 >60 mL/min/1.7m2    Calcium 8.3 (L) 8.5 - 10.1 mg/dL    Bilirubin Total 0.6 0.2 - 1.3 mg/dL    Albumin 3.3 (L) 3.4 - 5.0 g/dL    Protein Total 6.6 (L) 6.8 - 8.8 g/dL    Alkaline Phosphatase 62 40 - 150 U/L    ALT 13 0 - 50 U/L    AST 22 0 - 45 U/L   Troponin I   Result Value Ref Range    Troponin I ES <0.015 0.000 - 0.045 ug/L   Lactic acid whole blood   Result Value Ref Range    Lactic Acid 0.9 0.7 - 2.0 mmol/L   Nt probnp inpatient (BNP)   Result Value Ref Range    N-Terminal Pro BNP Inpatient 186 0 - 1800 pg/mL   Blood gas venous   Result Value Ref Range    Ph Venous 7.45 (H) 7.32 - 7.43 pH    PCO2 Venous 41 40 - 50 mm Hg    PO2 Venous 35 25 - 47 mm Hg    Bicarbonate Venous 29 (H) 21 - 28 mmol/L    Base Excess Venous 4.5 mmol/L    FIO2 REPORT AMENDED:      Patient reevaluated at 1500, workup is essentially unremarkable to this point.  Discussed admission criteria, thus far have come up with no good reason to admit her to the hospital.  She is not hypoxic.  She is not vomiting.  She is able to take oral antibiotic and prednisone.  I will try a DuoNeb at this time and reevaluate her once that is complete.  Plan is to discharge on prednisone, Zithromax and  albuterol.    Reevaluated after DuoNeb, moving air better, subjectively feels slightly better.    MDM: 87-year-old female 1 week of cough.  No evidence for pneumonia, pneumothorax, congestive heart failure, acute coronary syndrome.  Discussed admission, no criteria appreciated.  Discharged home with prescription for prednisone, Zithromax, albuterol inhaler.  Follow-up primary care 3-5 days.  Return criteria reviewed.    Impression: Bronchitis     Christopher Palafox MD  12/27/17 3192

## 2017-12-28 ENCOUNTER — COMMUNICATION - HEALTHEAST (OUTPATIENT)
Dept: FAMILY MEDICINE | Facility: CLINIC | Age: 82
End: 2017-12-28

## 2017-12-29 ENCOUNTER — COMMUNICATION - HEALTHEAST (OUTPATIENT)
Dept: FAMILY MEDICINE | Facility: CLINIC | Age: 82
End: 2017-12-29

## 2018-01-02 ENCOUNTER — COMMUNICATION - HEALTHEAST (OUTPATIENT)
Dept: FAMILY MEDICINE | Facility: CLINIC | Age: 83
End: 2018-01-02

## 2018-01-11 ENCOUNTER — COMMUNICATION - HEALTHEAST (OUTPATIENT)
Dept: FAMILY MEDICINE | Facility: CLINIC | Age: 83
End: 2018-01-11

## 2018-01-12 ENCOUNTER — COMMUNICATION - HEALTHEAST (OUTPATIENT)
Dept: FAMILY MEDICINE | Facility: CLINIC | Age: 83
End: 2018-01-12

## 2018-01-18 ENCOUNTER — COMMUNICATION - HEALTHEAST (OUTPATIENT)
Dept: FAMILY MEDICINE | Facility: CLINIC | Age: 83
End: 2018-01-18

## 2018-01-19 ENCOUNTER — COMMUNICATION - HEALTHEAST (OUTPATIENT)
Dept: FAMILY MEDICINE | Facility: CLINIC | Age: 83
End: 2018-01-19

## 2018-01-19 ENCOUNTER — OFFICE VISIT - HEALTHEAST (OUTPATIENT)
Dept: FAMILY MEDICINE | Facility: CLINIC | Age: 83
End: 2018-01-19

## 2018-01-19 DIAGNOSIS — M89.9 DISORDER OF BONE AND CARTILAGE: ICD-10-CM

## 2018-01-19 DIAGNOSIS — Z71.89 COUNSELING AND COORDINATION OF CARE: ICD-10-CM

## 2018-01-19 DIAGNOSIS — R29.6 FALLS: ICD-10-CM

## 2018-01-19 DIAGNOSIS — I10 ESSENTIAL HYPERTENSION: ICD-10-CM

## 2018-01-19 DIAGNOSIS — M81.0 OSTEOPOROSIS: ICD-10-CM

## 2018-01-19 DIAGNOSIS — E78.1 HYPERTRIGLYCERIDEMIA: ICD-10-CM

## 2018-01-19 DIAGNOSIS — M94.9 DISORDER OF BONE AND CARTILAGE: ICD-10-CM

## 2018-01-19 ASSESSMENT — MIFFLIN-ST. JEOR: SCORE: 1075.86

## 2018-02-05 ENCOUNTER — COMMUNICATION - HEALTHEAST (OUTPATIENT)
Dept: FAMILY MEDICINE | Facility: CLINIC | Age: 83
End: 2018-02-05

## 2018-02-16 ENCOUNTER — COMMUNICATION - HEALTHEAST (OUTPATIENT)
Dept: FAMILY MEDICINE | Facility: CLINIC | Age: 83
End: 2018-02-16

## 2018-03-02 ENCOUNTER — COMMUNICATION - HEALTHEAST (OUTPATIENT)
Dept: FAMILY MEDICINE | Facility: CLINIC | Age: 83
End: 2018-03-02

## 2018-03-09 ENCOUNTER — COMMUNICATION - HEALTHEAST (OUTPATIENT)
Dept: FAMILY MEDICINE | Facility: CLINIC | Age: 83
End: 2018-03-09

## 2018-03-09 DIAGNOSIS — I10 BENIGN ESSENTIAL HTN: ICD-10-CM

## 2018-03-23 ENCOUNTER — COMMUNICATION - HEALTHEAST (OUTPATIENT)
Dept: FAMILY MEDICINE | Facility: CLINIC | Age: 83
End: 2018-03-23

## 2018-03-26 ENCOUNTER — OFFICE VISIT - HEALTHEAST (OUTPATIENT)
Dept: FAMILY MEDICINE | Facility: CLINIC | Age: 83
End: 2018-03-26

## 2018-03-26 DIAGNOSIS — H61.20 CERUMINOSIS: ICD-10-CM

## 2018-03-26 DIAGNOSIS — M79.605 LEFT LEG PAIN: ICD-10-CM

## 2018-05-15 ENCOUNTER — COMMUNICATION - HEALTHEAST (OUTPATIENT)
Dept: NURSING | Facility: CLINIC | Age: 83
End: 2018-05-15

## 2018-06-05 ENCOUNTER — RECORDS - HEALTHEAST (OUTPATIENT)
Dept: GENERAL RADIOLOGY | Facility: CLINIC | Age: 83
End: 2018-06-05

## 2018-06-05 ENCOUNTER — OFFICE VISIT - HEALTHEAST (OUTPATIENT)
Dept: FAMILY MEDICINE | Facility: CLINIC | Age: 83
End: 2018-06-05

## 2018-06-05 DIAGNOSIS — M48.10 DISH (DIFFUSE IDIOPATHIC SKELETAL HYPEROSTOSIS): ICD-10-CM

## 2018-06-05 DIAGNOSIS — M54.9 DORSALGIA, UNSPECIFIED: ICD-10-CM

## 2018-06-05 DIAGNOSIS — M54.9 MID BACK PAIN: ICD-10-CM

## 2018-06-05 ASSESSMENT — MIFFLIN-ST. JEOR: SCORE: 1103.08

## 2018-06-14 ENCOUNTER — COMMUNICATION - HEALTHEAST (OUTPATIENT)
Dept: NURSING | Facility: CLINIC | Age: 83
End: 2018-06-14

## 2018-06-21 ENCOUNTER — COMMUNICATION - HEALTHEAST (OUTPATIENT)
Dept: NURSING | Facility: CLINIC | Age: 83
End: 2018-06-21

## 2018-07-05 ENCOUNTER — COMMUNICATION - HEALTHEAST (OUTPATIENT)
Dept: NURSING | Facility: CLINIC | Age: 83
End: 2018-07-05

## 2018-07-14 ENCOUNTER — HOSPITAL ENCOUNTER (EMERGENCY)
Facility: CLINIC | Age: 83
Discharge: HOME | End: 2018-07-14
Attending: FAMILY MEDICINE | Admitting: FAMILY MEDICINE
Payer: COMMERCIAL

## 2018-07-14 ENCOUNTER — RECORDS - HEALTHEAST (OUTPATIENT)
Dept: ADMINISTRATIVE | Facility: OTHER | Age: 83
End: 2018-07-14

## 2018-07-14 VITALS
SYSTOLIC BLOOD PRESSURE: 169 MMHG | RESPIRATION RATE: 16 BRPM | TEMPERATURE: 97.7 F | WEIGHT: 155 LBS | DIASTOLIC BLOOD PRESSURE: 86 MMHG | BODY MASS INDEX: 26.46 KG/M2 | OXYGEN SATURATION: 94 % | HEIGHT: 64 IN

## 2018-07-14 DIAGNOSIS — R42 LIGHTHEADEDNESS: ICD-10-CM

## 2018-07-14 LAB
ALBUMIN SERPL-MCNC: 3.2 G/DL (ref 3.4–5)
ALBUMIN UR-MCNC: NEGATIVE MG/DL
ALP SERPL-CCNC: 56 U/L (ref 40–150)
ALT SERPL W P-5'-P-CCNC: 14 U/L (ref 0–50)
ANION GAP SERPL CALCULATED.3IONS-SCNC: 8 MMOL/L (ref 3–14)
APPEARANCE UR: CLEAR
AST SERPL W P-5'-P-CCNC: 10 U/L (ref 0–45)
BASOPHILS # BLD AUTO: 0 10E9/L (ref 0–0.2)
BASOPHILS NFR BLD AUTO: 0.4 %
BILIRUB SERPL-MCNC: 0.7 MG/DL (ref 0.2–1.3)
BILIRUB UR QL STRIP: NEGATIVE
BUN SERPL-MCNC: 16 MG/DL (ref 7–30)
CALCIUM SERPL-MCNC: 7.8 MG/DL (ref 8.5–10.1)
CHLORIDE SERPL-SCNC: 110 MMOL/L (ref 94–109)
CO2 SERPL-SCNC: 23 MMOL/L (ref 20–32)
COLOR UR AUTO: ABNORMAL
CREAT SERPL-MCNC: 0.43 MG/DL (ref 0.52–1.04)
DIFFERENTIAL METHOD BLD: ABNORMAL
EOSINOPHIL # BLD AUTO: 0.1 10E9/L (ref 0–0.7)
EOSINOPHIL NFR BLD AUTO: 1 %
ERYTHROCYTE [DISTWIDTH] IN BLOOD BY AUTOMATED COUNT: 12.6 % (ref 10–15)
GFR SERPL CREATININE-BSD FRML MDRD: >90 ML/MIN/1.7M2
GLUCOSE SERPL-MCNC: 163 MG/DL (ref 70–99)
GLUCOSE UR STRIP-MCNC: NEGATIVE MG/DL
HCT VFR BLD AUTO: 41.9 % (ref 35–47)
HGB BLD-MCNC: 13.7 G/DL (ref 11.7–15.7)
HGB UR QL STRIP: NEGATIVE
IMM GRANULOCYTES # BLD: 0 10E9/L (ref 0–0.4)
IMM GRANULOCYTES NFR BLD: 0.3 %
KETONES UR STRIP-MCNC: NEGATIVE MG/DL
LEUKOCYTE ESTERASE UR QL STRIP: NEGATIVE
LYMPHOCYTES # BLD AUTO: 1.9 10E9/L (ref 0.8–5.3)
LYMPHOCYTES NFR BLD AUTO: 27.1 %
MCH RBC QN AUTO: 31.4 PG (ref 26.5–33)
MCHC RBC AUTO-ENTMCNC: 32.7 G/DL (ref 31.5–36.5)
MCV RBC AUTO: 96 FL (ref 78–100)
MONOCYTES # BLD AUTO: 0.5 10E9/L (ref 0–1.3)
MONOCYTES NFR BLD AUTO: 7.3 %
MUCOUS THREADS #/AREA URNS LPF: PRESENT /LPF
NEUTROPHILS # BLD AUTO: 4.6 10E9/L (ref 1.6–8.3)
NEUTROPHILS NFR BLD AUTO: 63.9 %
NITRATE UR QL: NEGATIVE
NRBC # BLD AUTO: 0 10*3/UL
NRBC BLD AUTO-RTO: 0 /100
PH UR STRIP: 6 PH (ref 5–7)
PLATELET # BLD AUTO: 140 10E9/L (ref 150–450)
POTASSIUM SERPL-SCNC: 3.3 MMOL/L (ref 3.4–5.3)
PROT SERPL-MCNC: 6.2 G/DL (ref 6.8–8.8)
RBC # BLD AUTO: 4.36 10E12/L (ref 3.8–5.2)
RBC #/AREA URNS AUTO: <1 /HPF (ref 0–2)
SODIUM SERPL-SCNC: 141 MMOL/L (ref 133–144)
SOURCE: ABNORMAL
SP GR UR STRIP: 1 (ref 1–1.03)
UROBILINOGEN UR STRIP-MCNC: 0 MG/DL (ref 0–2)
WBC # BLD AUTO: 7.1 10E9/L (ref 4–11)
WBC #/AREA URNS AUTO: 1 /HPF (ref 0–5)

## 2018-07-14 PROCEDURE — 93010 ELECTROCARDIOGRAM REPORT: CPT | Mod: Z6 | Performed by: FAMILY MEDICINE

## 2018-07-14 PROCEDURE — 25000128 H RX IP 250 OP 636: Performed by: FAMILY MEDICINE

## 2018-07-14 PROCEDURE — 81001 URINALYSIS AUTO W/SCOPE: CPT | Performed by: FAMILY MEDICINE

## 2018-07-14 PROCEDURE — 80053 COMPREHEN METABOLIC PANEL: CPT | Performed by: FAMILY MEDICINE

## 2018-07-14 PROCEDURE — 99284 EMERGENCY DEPT VISIT MOD MDM: CPT | Mod: 25 | Performed by: FAMILY MEDICINE

## 2018-07-14 PROCEDURE — 96361 HYDRATE IV INFUSION ADD-ON: CPT

## 2018-07-14 PROCEDURE — 93005 ELECTROCARDIOGRAM TRACING: CPT

## 2018-07-14 PROCEDURE — 99284 EMERGENCY DEPT VISIT MOD MDM: CPT | Mod: 25

## 2018-07-14 PROCEDURE — 96360 HYDRATION IV INFUSION INIT: CPT

## 2018-07-14 PROCEDURE — 85025 COMPLETE CBC W/AUTO DIFF WBC: CPT | Performed by: FAMILY MEDICINE

## 2018-07-14 RX ORDER — SODIUM CHLORIDE 9 MG/ML
1000 INJECTION, SOLUTION INTRAVENOUS CONTINUOUS
Status: DISCONTINUED | OUTPATIENT
Start: 2018-07-14 | End: 2018-07-14 | Stop reason: HOSPADM

## 2018-07-14 RX ADMIN — SODIUM CHLORIDE 1000 ML: 9 INJECTION, SOLUTION INTRAVENOUS at 15:27

## 2018-07-14 ASSESSMENT — ENCOUNTER SYMPTOMS
APPETITE CHANGE: 1
SHORTNESS OF BREATH: 0
HEADACHES: 0
RHINORRHEA: 0
DIARRHEA: 0
PALPITATIONS: 0
WEAKNESS: 0
NAUSEA: 1
VOMITING: 0
LIGHT-HEADEDNESS: 1
SORE THROAT: 0
DIZZINESS: 1
CHILLS: 0
COUGH: 0
FEVER: 0
DIAPHORESIS: 0
CHOKING: 0

## 2018-07-14 NOTE — ED PROVIDER NOTES
History     Chief Complaint   Patient presents with     Dizziness     HPI  Nimo Concepcion is a 88 year old female who presents via EMS with complaint of dizziness.  She states that for about 24 hours she has had symptoms of lightheadedness with standing.  She notes that she has to stand for a period of minutes before she gets her equilibrium again and that she is okay to move on.  She states it is more of a lightheaded sensation versus an actual vertigo type sensation.  She denies any other symptoms per ROS.  She does complain of some lifelong bilateral ankle pain.    Problem List:    Patient Active Problem List    Diagnosis Date Noted     Fall at home 05/21/2014     Priority: Medium     Fall 05/21/2014     Priority: Medium        Past Medical History:    No past medical history on file.    Past Surgical History:    No past surgical history on file.    Family History:    No family history on file.    Social History:  Marital Status:   [5]  Social History   Substance Use Topics     Smoking status: Never Smoker     Smokeless tobacco: Not on file     Alcohol use No        Medications:      albuterol (PROAIR HFA/PROVENTIL HFA/VENTOLIN HFA) 108 (90 BASE) MCG/ACT Inhaler   BUPROPION HBR ER PO   CITALOPRAM HYDROBROMIDE PO   Pantoprazole Sodium (PROTONIX PO)   POTASSIUM CHLORIDE ER PO   TRAZODONE HCL PO   VERAPAMIL HCL ER PO 24HR CAPSULE         Review of Systems   Constitutional: Positive for appetite change (decreased). Negative for chills, diaphoresis and fever. Activity change: decreased.   HENT: Negative for congestion, ear pain, rhinorrhea and sore throat.    Eyes: Negative for visual disturbance.   Respiratory: Negative for cough, choking and shortness of breath.    Cardiovascular: Negative for chest pain, palpitations and leg swelling.   Gastrointestinal: Positive for nausea. Negative for diarrhea and vomiting.   Neurological: Positive for dizziness and light-headedness. Negative for syncope, weakness and  "headaches.       Physical Exam   BP: 170/87  Heart Rate: 92  Temp: 97.7  F (36.5  C)  Resp: 16  Height: 162.6 cm (5' 4\")  Weight: 70.3 kg (155 lb)  SpO2: 97 %  Lying Orthostatic BP: 171/80  Lying Orthostatic Pulse: 80 bpm  Sitting Orthostatic BP: 173/88  Sitting Orthostatic Pulse: 80 bpm  Standing Orthostatic BP: 152/94  Standing Orthostatic Pulse: 85 bpm      Physical Exam   Constitutional: She appears well-developed and well-nourished. No distress.   HENT:   Head: Normocephalic and atraumatic.   Right Ear: External ear normal.   Left Ear: External ear normal.   Mouth/Throat: Oropharynx is clear and moist. No oropharyngeal exudate.   Eyes: EOM are normal.   Neck: Normal range of motion. Neck supple.   Cardiovascular: Normal rate, regular rhythm and normal heart sounds.    No murmur heard.  Pulmonary/Chest: Effort normal and breath sounds normal. She has no wheezes.   Abdominal: Soft. Bowel sounds are normal. There is no tenderness.   Musculoskeletal: Normal range of motion. She exhibits no edema.   Lymphadenopathy:     She has no cervical adenopathy.   Neurological: She is alert. No cranial nerve deficit.   Skin: Skin is warm and dry. She is not diaphoretic.   Psychiatric: She has a normal mood and affect.       ED Course     ED Course     Procedures               EKG Interpretation:      Interpreted by Darrell Huber  Time reviewed: 1535  Symptoms at time of EKG: orthostatic dizziness   Rhythm: normal sinus   Rate: normal  Axis: normal  Ectopy: none  Conduction: normal  ST Segments/ T Waves: No ST-T wave changes  Q Waves: none  Comparison to prior: Unchanged from 12/27/17    Clinical Impression: normal EKG          Critical Care time:  none               Results for orders placed or performed during the hospital encounter of 07/14/18 (from the past 24 hour(s))   CBC with platelets differential   Result Value Ref Range    WBC 7.1 4.0 - 11.0 10e9/L    RBC Count 4.36 3.8 - 5.2 10e12/L    Hemoglobin 13.7 11.7 - 15.7 " g/dL    Hematocrit 41.9 35.0 - 47.0 %    MCV 96 78 - 100 fl    MCH 31.4 26.5 - 33.0 pg    MCHC 32.7 31.5 - 36.5 g/dL    RDW 12.6 10.0 - 15.0 %    Platelet Count 140 (L) 150 - 450 10e9/L    Diff Method Automated Method     % Neutrophils 63.9 %    % Lymphocytes 27.1 %    % Monocytes 7.3 %    % Eosinophils 1.0 %    % Basophils 0.4 %    % Immature Granulocytes 0.3 %    Nucleated RBCs 0 0 /100    Absolute Neutrophil 4.6 1.6 - 8.3 10e9/L    Absolute Lymphocytes 1.9 0.8 - 5.3 10e9/L    Absolute Monocytes 0.5 0.0 - 1.3 10e9/L    Absolute Eosinophils 0.1 0.0 - 0.7 10e9/L    Absolute Basophils 0.0 0.0 - 0.2 10e9/L    Abs Immature Granulocytes 0.0 0 - 0.4 10e9/L    Absolute Nucleated RBC 0.0    Comprehensive metabolic panel   Result Value Ref Range    Sodium 141 133 - 144 mmol/L    Potassium 3.3 (L) 3.4 - 5.3 mmol/L    Chloride 110 (H) 94 - 109 mmol/L    Carbon Dioxide 23 20 - 32 mmol/L    Anion Gap 8 3 - 14 mmol/L    Glucose 163 (H) 70 - 99 mg/dL    Urea Nitrogen 16 7 - 30 mg/dL    Creatinine 0.43 (L) 0.52 - 1.04 mg/dL    GFR Estimate >90 >60 mL/min/1.7m2    GFR Estimate If Black >90 >60 mL/min/1.7m2    Calcium 7.8 (L) 8.5 - 10.1 mg/dL    Bilirubin Total 0.7 0.2 - 1.3 mg/dL    Albumin 3.2 (L) 3.4 - 5.0 g/dL    Protein Total 6.2 (L) 6.8 - 8.8 g/dL    Alkaline Phosphatase 56 40 - 150 U/L    ALT 14 0 - 50 U/L    AST 10 0 - 45 U/L   UA with Microscopic   Result Value Ref Range    Color Urine Straw     Appearance Urine Clear     Glucose Urine Negative NEG^Negative mg/dL    Bilirubin Urine Negative NEG^Negative    Ketones Urine Negative NEG^Negative mg/dL    Specific Gravity Urine 1.004 1.003 - 1.035    Blood Urine Negative NEG^Negative    pH Urine 6.0 5.0 - 7.0 pH    Protein Albumin Urine Negative NEG^Negative mg/dL    Urobilinogen mg/dL 0.0 0.0 - 2.0 mg/dL    Nitrite Urine Negative NEG^Negative    Leukocyte Esterase Urine Negative NEG^Negative    Source Midstream Urine     WBC Urine 1 0 - 5 /HPF    RBC Urine <1 0 - 2 /HPF     Mucous Urine Present (A) NEG^Negative /LPF       Medications   0.9% sodium chloride BOLUS (0 mLs Intravenous Stopped 7/14/18 7673)     Followed by   sodium chloride 0.9% infusion (not administered)       Assessments & Plan (with Medical Decision Making)     I have reviewed the nursing notes.    I have reviewed the findings, diagnosis, plan and need for follow up with the patient.   Patient was observed in the ED for several hours.  Ultimately she was able to get up and ambulate to the bathroom without dizziness or difficulty.  She requested to go home and I concurred as long as she agreed to use her walker and she does have somebody home to help her as well.  She will follow-up with primary care this week recheck sooner as needed    New Prescriptions    No medications on file       Final diagnoses:   Lightheadedness       7/14/2018   [facility] EMERGENCY DEPARTMENT     Darrell Huber MD  07/14/18 7444

## 2018-07-14 NOTE — ED AVS SNAPSHOT
[facility] Emergency Department    [address]    [facility] [address]    Phone:  [phone]    Fax:  [phone]                                       Nimo Concepcion   MRN: [id number]    Department:  [facility] Emergency Department   Date of Visit:  7/14/2018           Patient Information     Date Of Birth          4/11/1930        Your diagnoses for this visit were:     Lightheadedness        You were seen by Darerll Huber MD.      Follow-up Information     Follow up with Katarina Colon In 2 days.    Specialty:  Family Practice    Contact information:    [facility]  [address] [facility] [address]  [facility] [id number] [phone]          Discharge Instructions         Dizziness (Vertigo) and Balance Problems: Staying Safe     Replace burned-out light bulbs to keep your home safe and well lit.     Falls or accidents can lead to pain, broken bones, and fear of future falls. Protect yourself and others by preparing for episodes. Simple steps can help you stay safe at home and wherever you go.  Lighting  Keep all areas well lit. This helps your eyes send the right signals to the brain. It also makes you less likely to trip and fall. If bright lights make symptoms worse, dim the lights or lie in a dark room until the dizziness passes. Then turn the lights back to their normal level.  Tips:    Keep a flashlight by the bed.    Place nightlights in bathrooms and hallways.    Replace burned-out bulbs, or have someone replace them for you.  Preventing falls  To reduce your risk of falling:    Get out of bed or up from a chair slowly.    Wear low-heeled shoes that fit properly and have slip-resistant soles.    Remove throw rugs. Clear clutter from walkways.    Use handrails on stairs. Have handrails installed or adjusted if needed.    Install grab bars in the bathroom. Don't use towel racks for balance.    Use a shower stool. Also put adhesive strips in the shower or on the tub floor.  Going  out  With a little time and preparation, you can get around safely.  Tips:    Bring a cane or walking aid if needed.    Give yourself plenty of time in case you start to get dizzy.    Ask your healthcare provider what type of exercise is safe for your condition.    Be patient. If an activity such as walking through a crowded shop causes you stress, you may not be ready for it yet.  Driving  If you become dizzy or disoriented while driving, you could hurt yourself and others. That's why it's best to not drive until symptoms have gone away. In some cases, your license may be temporarily held until it's safe for you to drive again.  For safety:    Ask a friend to drive for you.    Take public transportation.    Walk to stores and other places when you can.  Asking for help  Don't be afraid to ask for help running errands, cooking meals, and doing exercise. Whether it's a friend, loved one, neighbor, or stranger on the street, a little help can make a world of difference.   Date Last Reviewed: 11/1/2016 2000-2017 [employer]. [address] [place] [address]. All rights reserved. This information is not intended as a substitute for professional medical care. Always follow your healthcare professional's instructions.          Dizziness (Uncertain Cause)  Dizziness is a common symptom. It may be described as lightheadedness, spinning, or feeling like you are going to faint. Dizziness can have many causes.  Be sure to tell the healthcare provider about:    All medicines you take, including prescription, over-the-counter, herbs, and supplements    Any other symptoms you have    Any health problems you are being treated for    Any past major health problems you've had, such as a heart attack, balance issues, hearing problems, or blood pressure problems    Anything that causes the dizziness to get worse or better  Today's exam did not show an exact cause for your dizziness. Other tests may be needed.  Follow up with your healthcare provider.  Home care    Dizziness that occurs with sudden standing may be a sign of mild dehydration. Drink extra fluids for the next few days.    If you recently started a new medicine, stopped a medicine, or had the dose of a current medicine changed, talk with the prescribing healthcare provider. Your medicine plan may need adjustment.    If dizziness lasts more than a few seconds, sit or lie down until it passes. This may help prevent injury in case you pass out. Get up slowly when you feel better.    Don't drive or use power tools or dangerous equipment until you have had no dizziness for at least 48 hours.  Follow-up care  Follow up with your healthcare provider for further evaluation within the next 7 days or as advised.  When to seek medical advice  Call your healthcare provider for any of the following:    Worsening of symptoms or new symptoms    Passing out or seizure    Repeated vomiting    Headache    Palpitations (the sense that your heart is fluttering or beating fast or hard)    Shortness of breath    Blood in vomit or stool (black or red color)    Weakness of an arm or leg or 1 side of the face    Vision or hearing changes    Trouble walking or speaking    Chest, arm, neck, back, or jaw pain  Date Last Reviewed: 11/1/2017 2000-2017 [employer]. [address] [place] [address]. All rights reserved. This information is not intended as a substitute for professional medical care. Always follow your healthcare professional's instructions.          24 Hour Appointment Hotline       To make an appointment at any [facility], call [phone] ([phone]). If you don't have a family doctor or clinic, we will help you find one. [facility] clinics are conveniently located to serve the needs of you and your family.             Review of your medicines      Our records show that you are taking the medicines listed below. If these are incorrect,  please call your family doctor or clinic.        Dose / Directions Last dose taken    albuterol 108 (90 Base) MCG/ACT Inhaler   Commonly known as:  PROAIR HFA/PROVENTIL HFA/VENTOLIN HFA   Dose:  2 puff   Quantity:  1 Inhaler        Inhale 2 puffs into the lungs every 4 hours as needed for shortness of breath / dyspnea or wheezing   Refills:  0        BUPROPION HBR ER PO   Dose:  150 mg        Take 150 mg by mouth daily   Refills:  0        CITALOPRAM HYDROBROMIDE PO   Dose:  40 mg        Take 40 mg by mouth daily   Refills:  0        POTASSIUM CHLORIDE ER PO   Dose:  30 mEq        Take 30 mEq by mouth daily Three tabs daily (total dose 30mEq)   Refills:  0        PROTONIX PO   Dose:  40 mg        Take 40 mg by mouth every morning (before breakfast)   Refills:  0        TRAZODONE HCL PO   Dose:  100-200 mg        Take 100-200 mg by mouth At Bedtime   Refills:  0        VERAPAMIL HCL ER PO 24HR CAPSULE   Dose:  100 mg        Take 100 mg by mouth daily   Refills:  0                Procedures and tests performed during your visit     CBC with platelets differential    Comprehensive metabolic panel    EKG 12-lead, tracing only    UA with Microscopic      Orders Needing Specimen Collection     None      Pending Results     No orders found from 7/12/2018 to 7/15/2018.            Pending Culture Results     No orders found from 7/12/2018 to 7/15/2018.            Pending Results Instructions     If you had any lab results that were not finalized at the time of your Discharge, you can call the ED Lab Result RN at [phone]. You will be contacted by this team for any positive Lab results or changes in treatment. The nurses are available 7 days a week from 10A to 6:30P.  You can leave a message 24 hours per day and they will return your call.        Test Results From Your Hospital Stay        7/14/2018  3:49 PM      Component Results     Component Value Ref Range & Units Status    WBC 7.1 4.0 - 11.0 10e9/L Final    RBC Count  4.36 3.8 - 5.2 10e12/L Final    Hemoglobin 13.7 11.7 - 15.7 g/dL Final    Hematocrit 41.9 35.0 - 47.0 % Final    MCV 96 78 - 100 fl Final    MCH 31.4 26.5 - 33.0 pg Final    MCHC 32.7 31.5 - 36.5 g/dL Final    RDW 12.6 10.0 - 15.0 % Final    Platelet Count 140 (L) 150 - 450 10e9/L Final    Diff Method Automated Method  Final    % Neutrophils 63.9 % Final    % Lymphocytes 27.1 % Final    % Monocytes 7.3 % Final    % Eosinophils 1.0 % Final    % Basophils 0.4 % Final    % Immature Granulocytes 0.3 % Final    Nucleated RBCs 0 0 /100 Final    Absolute Neutrophil 4.6 1.6 - 8.3 10e9/L Final    Absolute Lymphocytes 1.9 0.8 - 5.3 10e9/L Final    Absolute Monocytes 0.5 0.0 - 1.3 10e9/L Final    Absolute Eosinophils 0.1 0.0 - 0.7 10e9/L Final    Absolute Basophils 0.0 0.0 - 0.2 10e9/L Final    Abs Immature Granulocytes 0.0 0 - 0.4 10e9/L Final    Absolute Nucleated RBC 0.0  Final         7/14/2018  3:52 PM      Component Results     Component Value Ref Range & Units Status    Sodium 141 133 - 144 mmol/L Final    Potassium 3.3 (L) 3.4 - 5.3 mmol/L Final    Chloride 110 (H) 94 - 109 mmol/L Final    Carbon Dioxide 23 20 - 32 mmol/L Final    Anion Gap 8 3 - 14 mmol/L Final    Glucose 163 (H) 70 - 99 mg/dL Final    Urea Nitrogen 16 7 - 30 mg/dL Final    Creatinine 0.43 (L) 0.52 - 1.04 mg/dL Final    GFR Estimate >90 >60 mL/min/1.7m2 Final    Non  GFR Calc    GFR Estimate If Black >90 >60 mL/min/1.7m2 Final    African American GFR Calc    Calcium 7.8 (L) 8.5 - 10.1 mg/dL Final    Bilirubin Total 0.7 0.2 - 1.3 mg/dL Final    Albumin 3.2 (L) 3.4 - 5.0 g/dL Final    Protein Total 6.2 (L) 6.8 - 8.8 g/dL Final    Alkaline Phosphatase 56 40 - 150 U/L Final    ALT 14 0 - 50 U/L Final    AST 10 0 - 45 U/L Final         7/14/2018  5:21 PM      Component Results     Component Value Ref Range & Units Status    Color Urine Straw  Final    Appearance Urine Clear  Final    Glucose Urine Negative NEG^Negative mg/dL Final     "Bilirubin Urine Negative NEG^Negative Final    Ketones Urine Negative NEG^Negative mg/dL Final    Specific Gravity Urine 1.004 1.003 - 1.035 Final    Blood Urine Negative NEG^Negative Final    pH Urine 6.0 5.0 - 7.0 pH Final    Protein Albumin Urine Negative NEG^Negative mg/dL Final    Urobilinogen mg/dL 0.0 0.0 - 2.0 mg/dL Final    Nitrite Urine Negative NEG^Negative Final    Leukocyte Esterase Urine Negative NEG^Negative Final    Source Midstream Urine  Final    WBC Urine 1 0 - 5 /HPF Final    RBC Urine <1 0 - 2 /HPF Final    Mucous Urine Present (A) NEG^Negative /LPF Final                Thank you for choosing [facility]       Thank you for choosing [facility] for your care. Our goal is always to provide you with excellent care. Hearing back from our patients is one way we can continue to improve our services. Please take a few minutes to complete the written survey that you may receive in the mail after you visit with us. Thank you!        [employer] Information     [employer] lets you send messages to your doctor, view your test results, renew your prescriptions, schedule appointments and more. To sign up, go to [email or web address] . Click on \"Log in\" on the left side of the screen, which will take you to the Welcome page. Then click on \"Sign up Now\" on the right side of the page.     You will be asked to enter the access code listed below, as well as some personal information. Please follow the directions to create your username and password.     Your access code is: [email or web address]  Expires: 10/12/2018  7:07 PM     Your access code will  in 90 days. If you need help or a new code, please call your [facility] clinic or [phone].        Care EveryWhere ID     This is your Care EveryWhere ID. This could be used by other organizations to access your [facility] medical records  [id number]        Equal Access to Services     MILES BHAGAT: Kyle Coronel, sonam hart, harvey morgan, " mishel love'aan ah. So [facility] [phone].    ATENCIÓN: Si habla español, tiene a quintana disposición servicios gratuitos de asistencia lingüística. Llame al [phone].    We comply with applicable federal civil rights laws and [place] laws. We do not discriminate on the basis of race, color, national origin, age, disability, sex, sexual orientation, or gender identity.            After Visit Summary       This is your record. Keep this with you and show to your community pharmacist(s) and doctor(s) at your next visit.

## 2018-07-14 NOTE — ED NOTES
Pt presents to ED with concerned of feeling dizzy and lightheaded since yesterday afternoon. Pt has not had anything like this before. No fevers, no vomiting, no diarrhea. Pt notes the dizziness is worse when she stands up. It usually takes a couple minutes after standing up until the dizziness resolves. No urinary changes.

## 2018-07-14 NOTE — ED AVS SNAPSHOT
[facility] Emergency Department    [address] [address] [facility] [address]    Phone:  [phone]    Fax:  [phone]                                       Nimo Concepcion   MRN: [id number]    Department:  [facility] Emergency Department   Date of Visit:  7/14/2018           After Visit Summary Signature Page     I have received my discharge instructions, and my questions have been answered. I have discussed any challenges I see with this plan with the nurse or doctor.    ..........................................................................................................................................  Patient/Patient Representative Signature      ..........................................................................................................................................  Patient Representative Print Name and Relationship to Patient    ..................................................               ................................................  Date                                            Time    ..........................................................................................................................................  Reviewed by Signature/Title    ...................................................              ..............................................  Date                                                            Time

## 2018-07-15 NOTE — ED NOTES
Pt ambulates out of department independently.  Discharge instructions reviewed in detail.  Pt verbalized understanding.  No further questions or concerns.

## 2018-07-15 NOTE — DISCHARGE INSTRUCTIONS
Dizziness (Vertigo) and Balance Problems: Staying Safe     Replace burned-out light bulbs to keep your home safe and well lit.     Falls or accidents can lead to pain, broken bones, and fear of future falls. Protect yourself and others by preparing for episodes. Simple steps can help you stay safe at home and wherever you go.  Lighting  Keep all areas well lit. This helps your eyes send the right signals to the brain. It also makes you less likely to trip and fall. If bright lights make symptoms worse, dim the lights or lie in a dark room until the dizziness passes. Then turn the lights back to their normal level.  Tips:    Keep a flashlight by the bed.    Place nightlights in bathrooms and hallways.    Replace burned-out bulbs, or have someone replace them for you.  Preventing falls  To reduce your risk of falling:    Get out of bed or up from a chair slowly.    Wear low-heeled shoes that fit properly and have slip-resistant soles.    Remove throw rugs. Clear clutter from walkways.    Use handrails on stairs. Have handrails installed or adjusted if needed.    Install grab bars in the bathroom. Don't use towel racks for balance.    Use a shower stool. Also put adhesive strips in the shower or on the tub floor.  Going out  With a little time and preparation, you can get around safely.  Tips:    Bring a cane or walking aid if needed.    Give yourself plenty of time in case you start to get dizzy.    Ask your healthcare provider what type of exercise is safe for your condition.    Be patient. If an activity such as walking through a crowded shop causes you stress, you may not be ready for it yet.  Driving  If you become dizzy or disoriented while driving, you could hurt yourself and others. That's why it's best to not drive until symptoms have gone away. In some cases, your license may be temporarily held until it's safe for you to drive again.  For safety:    Ask a friend to drive for you.    Take public  transportation.    Walk to stores and other places when you can.  Asking for help  Don't be afraid to ask for help running errands, cooking meals, and doing exercise. Whether it's a friend, loved one, neighbor, or stranger on the street, a little help can make a world of difference.   Date Last Reviewed: 11/1/2016 2000-2017 [employer]. [address] [place] [address], [place] [address]. All rights reserved. This information is not intended as a substitute for professional medical care. Always follow your healthcare professional's instructions.          Dizziness (Uncertain Cause)  Dizziness is a common symptom. It may be described as lightheadedness, spinning, or feeling like you are going to faint. Dizziness can have many causes.  Be sure to tell the healthcare provider about:    All medicines you take, including prescription, over-the-counter, herbs, and supplements    Any other symptoms you have    Any health problems you are being treated for    Any past major health problems you've had, such as a heart attack, balance issues, hearing problems, or blood pressure problems    Anything that causes the dizziness to get worse or better  Today's exam did not show an exact cause for your dizziness. Other tests may be needed. Follow up with your healthcare provider.  Home care    Dizziness that occurs with sudden standing may be a sign of mild dehydration. Drink extra fluids for the next few days.    If you recently started a new medicine, stopped a medicine, or had the dose of a current medicine changed, talk with the prescribing healthcare provider. Your medicine plan may need adjustment.    If dizziness lasts more than a few seconds, sit or lie down until it passes. This may help prevent injury in case you pass out. Get up slowly when you feel better.    Don't drive or use power tools or dangerous equipment until you have had no dizziness for at least 48 hours.  Follow-up care  Follow up with your healthcare  provider for further evaluation within the next 7 days or as advised.  When to seek medical advice  Call your healthcare provider for any of the following:    Worsening of symptoms or new symptoms    Passing out or seizure    Repeated vomiting    Headache    Palpitations (the sense that your heart is fluttering or beating fast or hard)    Shortness of breath    Blood in vomit or stool (black or red color)    Weakness of an arm or leg or 1 side of the face    Vision or hearing changes    Trouble walking or speaking    Chest, arm, neck, back, or jaw pain  Date Last Reviewed: 11/1/2017 2000-2017 [employer]. [address] [place] [address]. All rights reserved. This information is not intended as a substitute for professional medical care. Always follow your healthcare professional's instructions.

## 2018-07-18 ENCOUNTER — OFFICE VISIT - HEALTHEAST (OUTPATIENT)
Dept: FAMILY MEDICINE | Facility: CLINIC | Age: 83
End: 2018-07-18

## 2018-07-18 DIAGNOSIS — R42 DIZZINESS AND GIDDINESS: ICD-10-CM

## 2018-07-18 ASSESSMENT — MIFFLIN-ST. JEOR: SCORE: 1082.38

## 2018-07-30 ENCOUNTER — COMMUNICATION - HEALTHEAST (OUTPATIENT)
Dept: NURSING | Facility: CLINIC | Age: 83
End: 2018-07-30

## 2018-09-04 ENCOUNTER — COMMUNICATION - HEALTHEAST (OUTPATIENT)
Dept: NURSING | Facility: CLINIC | Age: 83
End: 2018-09-04

## 2018-09-05 ENCOUNTER — OFFICE VISIT - HEALTHEAST (OUTPATIENT)
Dept: FAMILY MEDICINE | Facility: CLINIC | Age: 83
End: 2018-09-05

## 2018-09-05 DIAGNOSIS — R10.12 LEFT UPPER QUADRANT PAIN: ICD-10-CM

## 2018-09-05 DIAGNOSIS — I10 BENIGN ESSENTIAL HTN: ICD-10-CM

## 2018-09-05 LAB
ALBUMIN SERPL-MCNC: 3.9 G/DL (ref 3.5–5)
ALBUMIN UR-MCNC: ABNORMAL MG/DL
ALP SERPL-CCNC: 62 U/L (ref 45–120)
ALT SERPL W P-5'-P-CCNC: <9 U/L (ref 0–45)
ANION GAP SERPL CALCULATED.3IONS-SCNC: 9 MMOL/L (ref 5–18)
APPEARANCE UR: CLEAR
AST SERPL W P-5'-P-CCNC: 14 U/L (ref 0–40)
BACTERIA #/AREA URNS HPF: ABNORMAL HPF
BASOPHILS # BLD AUTO: 0.1 THOU/UL (ref 0–0.2)
BASOPHILS NFR BLD AUTO: 1 % (ref 0–2)
BILIRUB SERPL-MCNC: 0.8 MG/DL (ref 0–1)
BILIRUB UR QL STRIP: NEGATIVE
BUN SERPL-MCNC: 18 MG/DL (ref 8–28)
CALCIUM SERPL-MCNC: 9.6 MG/DL (ref 8.5–10.5)
CAOX CRY #/AREA URNS HPF: PRESENT /[HPF]
CHLORIDE BLD-SCNC: 107 MMOL/L (ref 98–107)
CO2 SERPL-SCNC: 26 MMOL/L (ref 22–31)
COLOR UR AUTO: YELLOW
CREAT SERPL-MCNC: 0.62 MG/DL (ref 0.6–1.1)
EOSINOPHIL # BLD AUTO: 0.1 THOU/UL (ref 0–0.4)
EOSINOPHIL NFR BLD AUTO: 1 % (ref 0–6)
ERYTHROCYTE [DISTWIDTH] IN BLOOD BY AUTOMATED COUNT: 12.1 % (ref 11–14.5)
GFR SERPL CREATININE-BSD FRML MDRD: >60 ML/MIN/1.73M2
GLUCOSE BLD-MCNC: 92 MG/DL (ref 70–125)
GLUCOSE UR STRIP-MCNC: NEGATIVE MG/DL
HCT VFR BLD AUTO: 42.6 % (ref 35–47)
HGB BLD-MCNC: 14.3 G/DL (ref 12–16)
HGB UR QL STRIP: NEGATIVE
KETONES UR STRIP-MCNC: ABNORMAL MG/DL
LEUKOCYTE ESTERASE UR QL STRIP: ABNORMAL
LYMPHOCYTES # BLD AUTO: 3.1 THOU/UL (ref 0.8–4.4)
LYMPHOCYTES NFR BLD AUTO: 33 % (ref 20–40)
MCH RBC QN AUTO: 31.5 PG (ref 27–34)
MCHC RBC AUTO-ENTMCNC: 33.5 G/DL (ref 32–36)
MCV RBC AUTO: 94 FL (ref 80–100)
MONOCYTES # BLD AUTO: 0.8 THOU/UL (ref 0–0.9)
MONOCYTES NFR BLD AUTO: 9 % (ref 2–10)
MUCOUS THREADS #/AREA URNS LPF: ABNORMAL LPF
NEUTROPHILS # BLD AUTO: 5.3 THOU/UL (ref 2–7.7)
NEUTROPHILS NFR BLD AUTO: 57 % (ref 50–70)
NITRATE UR QL: NEGATIVE
PH UR STRIP: 5.5 [PH] (ref 5–8)
PLATELET # BLD AUTO: 163 THOU/UL (ref 140–440)
PMV BLD AUTO: 7.6 FL (ref 7–10)
POTASSIUM BLD-SCNC: 3.7 MMOL/L (ref 3.5–5)
PROT SERPL-MCNC: 6.5 G/DL (ref 6–8)
RBC # BLD AUTO: 4.54 MILL/UL (ref 3.8–5.4)
RBC #/AREA URNS AUTO: ABNORMAL HPF
SODIUM SERPL-SCNC: 142 MMOL/L (ref 136–145)
SP GR UR STRIP: >=1.03 (ref 1–1.03)
SQUAMOUS #/AREA URNS AUTO: ABNORMAL LPF
UROBILINOGEN UR STRIP-ACNC: ABNORMAL
WBC #/AREA URNS AUTO: ABNORMAL HPF
WBC CLUMPS #/AREA URNS HPF: PRESENT /[HPF]
WBC: 9.4 THOU/UL (ref 4–11)

## 2018-09-05 ASSESSMENT — MIFFLIN-ST. JEOR: SCORE: 1084.93

## 2018-09-06 LAB — BACTERIA SPEC CULT: NO GROWTH

## 2018-09-12 ENCOUNTER — COMMUNICATION - HEALTHEAST (OUTPATIENT)
Dept: FAMILY MEDICINE | Facility: CLINIC | Age: 83
End: 2018-09-12

## 2018-09-28 ENCOUNTER — COMMUNICATION - HEALTHEAST (OUTPATIENT)
Dept: FAMILY MEDICINE | Facility: CLINIC | Age: 83
End: 2018-09-28

## 2018-09-28 DIAGNOSIS — I10 ESSENTIAL HYPERTENSION: ICD-10-CM

## 2018-10-04 ENCOUNTER — COMMUNICATION - HEALTHEAST (OUTPATIENT)
Dept: NURSING | Facility: CLINIC | Age: 83
End: 2018-10-04

## 2018-10-04 ENCOUNTER — AMBULATORY - HEALTHEAST (OUTPATIENT)
Dept: CARE COORDINATION | Facility: CLINIC | Age: 83
End: 2018-10-04

## 2018-10-10 ENCOUNTER — COMMUNICATION - HEALTHEAST (OUTPATIENT)
Dept: FAMILY MEDICINE | Facility: CLINIC | Age: 83
End: 2018-10-10

## 2018-10-10 DIAGNOSIS — K21.00 REFLUX ESOPHAGITIS: ICD-10-CM

## 2018-10-16 ENCOUNTER — APPOINTMENT (OUTPATIENT)
Dept: GENERAL RADIOLOGY | Facility: CLINIC | Age: 83
End: 2018-10-16
Attending: FAMILY MEDICINE
Payer: COMMERCIAL

## 2018-10-16 ENCOUNTER — RECORDS - HEALTHEAST (OUTPATIENT)
Dept: ADMINISTRATIVE | Facility: OTHER | Age: 83
End: 2018-10-16

## 2018-10-16 ENCOUNTER — HOSPITAL ENCOUNTER (EMERGENCY)
Facility: CLINIC | Age: 83
Discharge: HOME | End: 2018-10-16
Attending: FAMILY MEDICINE | Admitting: FAMILY MEDICINE
Payer: COMMERCIAL

## 2018-10-16 ENCOUNTER — APPOINTMENT (OUTPATIENT)
Dept: CT IMAGING | Facility: CLINIC | Age: 83
End: 2018-10-16
Attending: FAMILY MEDICINE
Payer: COMMERCIAL

## 2018-10-16 VITALS
OXYGEN SATURATION: 95 % | SYSTOLIC BLOOD PRESSURE: 175 MMHG | WEIGHT: 155 LBS | TEMPERATURE: 97.9 F | HEART RATE: 91 BPM | HEIGHT: 64 IN | RESPIRATION RATE: 16 BRPM | BODY MASS INDEX: 26.46 KG/M2 | DIASTOLIC BLOOD PRESSURE: 93 MMHG

## 2018-10-16 DIAGNOSIS — S32.040A CLOSED COMPRESSION FRACTURE OF FOURTH LUMBAR VERTEBRA, INITIAL ENCOUNTER: ICD-10-CM

## 2018-10-16 PROCEDURE — 99284 EMERGENCY DEPT VISIT MOD MDM: CPT | Mod: Z6 | Performed by: FAMILY MEDICINE

## 2018-10-16 PROCEDURE — 99284 EMERGENCY DEPT VISIT MOD MDM: CPT | Mod: 25

## 2018-10-16 PROCEDURE — 72131 CT LUMBAR SPINE W/O DYE: CPT

## 2018-10-16 PROCEDURE — 72100 X-RAY EXAM L-S SPINE 2/3 VWS: CPT

## 2018-10-16 RX ORDER — OXYCODONE AND ACETAMINOPHEN 5; 325 MG/1; MG/1
1-2 TABLET ORAL EVERY 4 HOURS PRN
Qty: 12 TABLET | Refills: 0 | Status: SHIPPED | OUTPATIENT
Start: 2018-10-16 | End: 2018-11-26

## 2018-10-16 NOTE — ED PROVIDER NOTES
"  History     Chief Complaint   Patient presents with     Back Pain     Pt states fell a couple of days ago and now with low back pain     Fall     HPI  Nimo Concepcion is a 88 year old female, past medical history significant for frequent falls, presents to the emergency department after she fell about 3 days ago when she lost her balance and fell backwards landing onto her buttocks.  She did not traumatize her head or neck or upper back.  She complains of pain in the low back and slightly to the left of midline.  No other concerns.  She has had no difficulties passing bowel movements or controlling her bowel or bladder.  She took Naprosyn for pain which was minimally helpful.  She is notes no paresthesias or weakness.  Pain is worse with movement.    Problem List:    Patient Active Problem List    Diagnosis Date Noted     Fall at home 05/21/2014     Priority: Medium     Fall 05/21/2014     Priority: Medium        Past Medical History:    No past medical history on file.    Past Surgical History:    No past surgical history on file.    Family History:    No family history on file.    Social History:  Marital Status:   [5]  Social History   Substance Use Topics     Smoking status: Never Smoker     Smokeless tobacco: Not on file     Alcohol use No        Medications:      Cyanocobalamin (VITAMIN B-12 PO)   oxyCODONE-acetaminophen (PERCOCET) 5-325 MG per tablet   Pantoprazole Sodium (PROTONIX PO)   POTASSIUM CHLORIDE ER PO   TRAZODONE HCL PO   VERAPAMIL HCL ER PO 24HR CAPSULE   albuterol (PROAIR HFA/PROVENTIL HFA/VENTOLIN HFA) 108 (90 BASE) MCG/ACT Inhaler   BUPROPION HBR ER PO         Review of Systems   All other systems reviewed and are negative.      Physical Exam   BP: 170/82  Pulse: 91  Temp: 97.9  F (36.6  C)  Resp: 16  Height: 162.6 cm (5' 4\")  Weight: 70.3 kg (155 lb)  SpO2: 94 %      Physical Exam   Constitutional: She appears well-developed and well-nourished.   HENT:   Head: Normocephalic and " atraumatic.   Right Ear: External ear normal.   Left Ear: External ear normal.   Nose: Nose normal.   Mouth/Throat: Oropharynx is clear and moist.   Eyes: Conjunctivae and EOM are normal. Pupils are equal, round, and reactive to light.   Neck: Normal range of motion. Neck supple.   Cardiovascular: Normal rate, regular rhythm, normal heart sounds and intact distal pulses.    Pulmonary/Chest: Effort normal and breath sounds normal.   Abdominal: Soft. Bowel sounds are normal.   Musculoskeletal: Normal range of motion.        Back:    Nursing note and vitals reviewed.      ED Course     ED Course     Procedures               Critical Care time:  none               Results for orders placed or performed during the hospital encounter of 10/16/18 (from the past 24 hour(s))   XR Lumbar Spine 2/3 Views    Narrative    LUMBAR SPINE THREE VIEWS  10/16/2018 2:02 PM     HISTORY: Fall, pain.     COMPARISON: None.      Impression    IMPRESSION:  There is compression deformity of L4 with approximately  50% height loss. There is multilevel degenerative disc disease. No  listhesis.     OLE BOBO MD   CT Lumbar Spine w/o Contrast    Narrative    CT LUMBAR SPINE WITHOUT CONTRAST 10/16/2018 3:46 PM     HISTORY: L4 comp fracture on x-ray; assess for stability/involvement  of posterior elements.     TECHNIQUE:  Axial images with reconstructions. Radiation dose for this  scan was reduced using automated exposure control, adjustment of the  mA and/or kV according to patient size, or iterative reconstruction  technique.    FINDINGS:  Osteopenia suspected. Multilevel degenerative disc disease  changes, greatest at L5-S1. Multilevel hyperostosis. Levoscoliosis.  There is a comminuted burst-type fracture of L4. There is  moderate-prominent loss of height. There is bony retropulsion which  extends about 60% of the spinal canal AP diameter. Therefore this  would not be a good candidate for vertebroplasty. There also appears  to be a subtle  nondisplaced fracture of the right L4 pedicle (series 8  image 34). Bilateral sacroiliac joint degenerative change.    Findings by specific level:    There is multilevel bilateral facet arthropathy, including moderate as  prominent bilateral facet arthropathy at L4-L5. Incidentally noted  sacral Tarlov cyst formation.    There is disc bulging at each level from L1 to S1. There is central  stenosis as follows: Borderline L2-L3, mild L3-L4 disc level, moderate  at the level of the L4 retropulsion, mild L4-L5 (medial-lateral  dimension). No high-grade foraminal stenosis is seen at any lumbar  level      Impression    IMPRESSION:  1. L4 burst fracture with moderate-prominent loss of height. Bony  retropulsion which extends 60% of the spinal canal AP diameter.  Fracture of the right L4 pedicle.  2. Multilevel degenerative disc and facet disease.  3. Mild central stenosis at L3-L4 and L4-L5.       Medications - No data to display  3:24 PM  Patient updated with respect to the new compression deformity of L4 with approximately 50% height loss.  This would be the concern given the mechanism of injury.  I plan to obtain CT through this to assess for instability involvement of posterior elements.  5:05 PM  I reviewed this patient's presentation, x-ray and CT with Dr. Salgado on-call for neurosurgery at [facility].  He carefully reviewed the patient case and reviewed the images obtained.  He recommended bracing in the form of TLSO, pain management, disposition to home.  He did not feel that given the patient's advanced age with this fracture that she would be a good candidate for surgery and recommended against surgery.  He took the patient's demographics in his clinic will contact the patient to arrange follow-up in clinic with Wanda Garrison.  I have placed an order for the TLSO, prescription for Percocet and discussed the use of both with the patient as well as a follow-up plan.  Her friend was with her  today and all information was shared with her as well.  If she has any concerns in the interim she can always return to the emergency department otherwise follow-up is with neurosurgery.    Assessments & Plan (with Medical Decision Making)   Assessment and plan with medical decision making at the time stamp above.      Disclaimer: This note consists of symbols derived from keyboarding, dictation and/or voice recognition software. As a result, there may be errors in the script that have gone undetected. Please consider this when interpreting information found in this chart.      I have reviewed the nursing notes.    I have reviewed the findings, diagnosis, plan and need for follow up with the patient.          New Prescriptions    OXYCODONE-ACETAMINOPHEN (PERCOCET) 5-325 MG PER TABLET    Take 1-2 tablets by mouth every 4 hours as needed for pain       Final diagnoses:   Closed compression fracture of fourth lumbar vertebra, initial encounter (H) - 50% compression fracture with significant retropulsion and involvement of the pedicle.  Viewed with neurosurgery Dr. Salgado: Nonoperative, bracing with TLSO recommended.       10/16/2018   [facility] EMERGENCY DEPARTMENT     Wilman Mcgrath MD  10/16/18 3339

## 2018-10-16 NOTE — ED AVS SNAPSHOT
[facility] Emergency Department    [address] [address]    Phone:  [phone]    Fax:  [phone]                                       Nimo Concepcion   MRN: [id number]    Department:  [facility] Emergency Department   Date of Visit:  10/16/2018           Patient Information     Date Of Birth          4/11/1930        Your diagnoses for this visit were:     Closed compression fracture of fourth lumbar vertebra, initial encounter (H) 50% compression fracture with significant retropulsion and involvement of the pedicle.  Viewed with neurosurgery Dr. Salgado: Nonoperative, bracing with TLSO recommended.       You were seen by Wilman Mcgrath MD.      Follow-up Information     Follow up with Paola Garrison PA-C.    Specialty:  Neurosurgery    Contact information:    [address] [facility] [address] [phone]          Discharge Instructions       The neurosurgeons office will be calling you to arrange for an appointment in clinic in the next 1-2 weeks.  Please use the Percocet half a tab to up to 2 tablets every 4-6 hours for pain and be very careful with fall potential when you do this.  An order has been placed for a TLSO for you; simply call the number listed and get this brace made for you as soon as possible.  Please call [phone] for [facility] to arrange this.  Return to the emergency department if you have concerns in the interim.      24 Hour Appointment Hotline       To make an appointment at any [facility] clinic, call [phone] ([phone]). If you don't have a family doctor or clinic, we will help you find one. [facility] clinics are conveniently located to serve the needs of you and your family.          ED Discharge Orders     TLSO FLEXIBLE, CUSTOM FABRICATED                    Review of your medicines      START taking        Dose / Directions Last dose taken    oxyCODONE-acetaminophen 5-325 MG per tablet   Commonly  known as:  PERCOCET   Dose:  1-2 tablet   Quantity:  12 tablet        Take 1-2 tablets by mouth every 4 hours as needed for pain   Refills:  0          Our records show that you are taking the medicines listed below. If these are incorrect, please call your family doctor or clinic.        Dose / Directions Last dose taken    albuterol 108 (90 Base) MCG/ACT inhaler   Commonly known as:  PROAIR HFA/PROVENTIL HFA/VENTOLIN HFA   Dose:  2 puff   Quantity:  1 Inhaler        Inhale 2 puffs into the lungs every 4 hours as needed for shortness of breath / dyspnea or wheezing   Refills:  0        BUPROPION HBR ER PO   Dose:  150 mg        Take 150 mg by mouth daily   Refills:  0        POTASSIUM CHLORIDE ER PO   Dose:  20 mEq        Take 20 mEq by mouth daily Three tabs daily (total dose 30mEq)   Refills:  0        PROTONIX PO   Dose:  40 mg        Take 40 mg by mouth 2 times daily (before meals)   Refills:  0        TRAZODONE HCL PO   Dose:  200 mg        Take 200 mg by mouth At Bedtime   Refills:  0        VERAPAMIL HCL ER PO 24HR CAPSULE   Dose:  100 mg        Take 100 mg by mouth daily   Refills:  0        VITAMIN B-12 PO   Dose:  1 capsule        Take 1 capsule by mouth daily   Refills:  0                Information about OPIOIDS     PRESCRIPTION OPIOIDS: WHAT YOU NEED TO KNOW   We gave you an opioid (narcotic) pain medicine. It is important to manage your pain, but opioids are not always the best choice. You should first try all the other options your care team gave you. Take this medicine for as short a time (and as few doses) as possible.    Some activities can increase your pain, such as bandage changes or therapy sessions. It may help to take your pain medicine 30 to 60 minutes before these activities. Reduce your stress by getting enough sleep, working on hobbies you enjoy and practicing relaxation or meditation. Talk to your care team about ways to manage your pain beyond prescription opioids.    These medicines  have risks:    DO NOT drive when on new or higher doses of pain medicine. These medicines can affect your alertness and reaction times, and you could be arrested for driving under the influence (DUI). If you need to use opioids long-term, talk to your care team about driving.    DO NOT operate heavy machinery    DO NOT do any other dangerous activities while taking these medicines.    DO NOT drink any alcohol while taking these medicines.     If the opioid prescribed includes acetaminophen, DO NOT take with any other medicines that contain acetaminophen. Read all labels carefully. Look for the word  acetaminophen  or  Tylenol.  Ask your pharmacist if you have questions or are unsure.    You can get addicted to pain medicines, especially if you have a history of addiction (chemical, alcohol or substance dependence). Talk to your care team about ways to reduce this risk.    All opioids tend to cause constipation. Drink plenty of water and eat foods that have a lot of fiber, such as fruits, vegetables, prune juice, apple juice and high-fiber cereal. Take a laxative (Miralax, milk of magnesia, Colace, Senna) if you don t move your bowels at least every other day. Other side effects include upset stomach, sleepiness, dizziness, throwing up, tolerance (needing more of the medicine to have the same effect), physical dependence and slowed breathing.    Store your pills in a secure place, locked if possible. We will not replace any lost or stolen medicine. If you don t finish your medicine, please throw away (dispose) as directed by your pharmacist. The [place] Pollution Control Agency has more information about safe disposal: https://www.pca.[facility].[place]/living-green/managing-unwanted-medications        Prescriptions were sent or printed at these locations (1 Prescription)                   [id number] - [place], [place] - [address]   [address] [address]    Telephone:  [phone]   Fax:   [phone]   Hours:  Closed 10-16-08 business to [facility]                Printed at Department/Unit printer (1 of 1)         oxyCODONE-acetaminophen (PERCOCET) 5-325 MG per tablet                Procedures and tests performed during your visit     CT Lumbar Spine w/o Contrast    XR Lumbar Spine 2/3 Views      Orders Needing Specimen Collection     None      Pending Results     Date and Time Order Name Status Description    10/16/2018 1523 CT Lumbar Spine w/o Contrast Preliminary             Pending Culture Results     No orders found from 10/14/2018 to 10/17/2018.            Pending Results Instructions     If you had any lab results that were not finalized at the time of your Discharge, you can call the ED Lab Result RN at [phone]. You will be contacted by this team for any positive Lab results or changes in treatment. The nurses are available 7 days a week from 10A to 6:30P.  You can leave a message 24 hours per day and they will return your call.        Test Results From Your Hospital Stay        10/16/2018  2:21 PM      Narrative     LUMBAR SPINE THREE VIEWS  10/16/2018 2:02 PM     HISTORY: Fall, pain.     COMPARISON: None.        Impression     IMPRESSION:  There is compression deformity of L4 with approximately  50% height loss. There is multilevel degenerative disc disease. No  listhesis.     OLE BOBO MD         10/16/2018  4:19 PM      Narrative     CT LUMBAR SPINE WITHOUT CONTRAST 10/16/2018 3:46 PM     HISTORY: L4 comp fracture on x-ray; assess for stability/involvement  of posterior elements.     TECHNIQUE:  Axial images with reconstructions. Radiation dose for this  scan was reduced using automated exposure control, adjustment of the  mA and/or kV according to patient size, or iterative reconstruction  technique.    FINDINGS:  Osteopenia suspected. Multilevel degenerative disc disease  changes, greatest at L5-S1. Multilevel hyperostosis. Levoscoliosis.  There is a comminuted  "burst-type fracture of L4. There is  moderate-prominent loss of height. There is bony retropulsion which  extends about 60% of the spinal canal AP diameter. Therefore this  would not be a good candidate for vertebroplasty. There also appears  to be a subtle nondisplaced fracture of the right L4 pedicle (series 8  image 34). Bilateral sacroiliac joint degenerative change.    Findings by specific level:    There is multilevel bilateral facet arthropathy, including moderate as  prominent bilateral facet arthropathy at L4-L5. Incidentally noted  sacral Tarlov cyst formation.    There is disc bulging at each level from L1 to S1. There is central  stenosis as follows: Borderline L2-L3, mild L3-L4 disc level, moderate  at the level of the L4 retropulsion, mild L4-L5 (medial-lateral  dimension). No high-grade foraminal stenosis is seen at any lumbar  level        Impression     IMPRESSION:  1. L4 burst fracture with moderate-prominent loss of height. Bony  retropulsion which extends 60% of the spinal canal AP diameter.  Fracture of the right L4 pedicle.  2. Multilevel degenerative disc and facet disease.  3. Mild central stenosis at L3-L4 and L4-L5.                Thank you for choosing [facility]       Thank you for choosing [facility] for your care. Our goal is always to provide you with excellent care. Hearing back from our patients is one way we can continue to improve our services. Please take a few minutes to complete the written survey that you may receive in the mail after you visit with us. Thank you!        [facility] [employer] Information     [facility] lets you send messages to your doctor, view your test results, renew your prescriptions, schedule appointments and more. To sign up, go to [email or web address] . Click on \"Log in\" on the left side of the screen, which will take you to the Welcome page. Then click on \"Sign up Now\" on the right side of the page.     You will be asked to enter the access code listed below, as well " as some personal information. Please follow the directions to create your username and password.     Your access code is: [email or web address]  Expires: 2019  4:57 PM     Your access code will  in 90 days. If you need help or a new code, please call your [facility] clinic or [phone].        Care EveryWhere ID     This is your Care EveryWhere ID. This could be used by other organizations to access your [facility] medical records  [id number]        Equal Access to Services     MILES LACY : Hadii gauri goo Soemmanuelle, waaxda luqadaha, qaybta kaalmada adetrisha, mishel kingsley . So [facility] [phone].    ATENCIÓN: Si habla español, tiene a quintana disposición servicios gratuitos de asistencia lingüística. Llame al [phone].    We comply with applicable federal civil rights laws and [place] laws. We do not discriminate on the basis of race, color, national origin, age, disability, sex, sexual orientation, or gender identity.            After Visit Summary       This is your record. Keep this with you and show to your community pharmacist(s) and doctor(s) at your next visit.

## 2018-10-16 NOTE — ED AVS SNAPSHOT
[facility] Emergency Department    [address] [facility] [address]    Phone:  [phone]    Fax:  [phone]                                       Nimo Concepcion   MRN: [id number]    Department:  [facility] Emergency Department   Date of Visit:  10/16/2018           After Visit Summary Signature Page     I have received my discharge instructions, and my questions have been answered. I have discussed any challenges I see with this plan with the nurse or doctor.    ..........................................................................................................................................  Patient/Patient Representative Signature      ..........................................................................................................................................  Patient Representative Print Name and Relationship to Patient    ..................................................               ................................................  Date                                   Time    ..........................................................................................................................................  Reviewed by Signature/Title    ...................................................              ..............................................  Date                                               Time          22EPIC Rev 08/18

## 2018-10-16 NOTE — ED NOTES
Pt arrived to ED with complaints of lower back pain after falling two days ago. Per family they brought her in today because she called and, asked to be brought in. Per patient she states the pain is not getting any better. Pt states that is about the same as it was yesterday. Pt states that she has had difficulty getting around at home since she fell.

## 2018-10-16 NOTE — DISCHARGE INSTRUCTIONS
The neurosurgeons office will be calling you to arrange for an appointment in clinic in the next 1-2 weeks.  Please use the Percocet half a tab to up to 2 tablets every 4-6 hours for pain and be very careful with fall potential when you do this.  An order has been placed for a TLSO for you; simply call the number listed and get this brace made for you as soon as possible.  Please call [phone] for [facility] medical [facility] to arrange this.  Return to the emergency department if you have concerns in the interim.

## 2018-10-17 ENCOUNTER — COMMUNICATION - HEALTHEAST (OUTPATIENT)
Dept: SCHEDULING | Facility: CLINIC | Age: 83
End: 2018-10-17

## 2018-10-20 ENCOUNTER — NURSE TRIAGE (OUTPATIENT)
Dept: NURSING | Facility: CLINIC | Age: 83
End: 2018-10-20

## 2018-10-21 NOTE — TELEPHONE ENCOUNTER
"Patient calling with a medication question. She was prescribed oxycodone and also takes trazodone for sleep. She is asking if she can take both medications. Patient has not yet taken the oxycodone (prescribed 10/16) so advised not trying that for the first time along with trazodone, to first try taking just the oxycodone and then when she knows how that effects her she can decide if she needs both medications. Patient agreed.  Additional Information    Negative: Drug overdose and nurse unable to answer question    Negative: Caller requesting information not related to medicine    Negative: Caller requesting a prescription for Strep throat and has a positive culture result    Negative: Rash while taking a medication or within 3 days of stopping it    Negative: Immunization reaction suspected    Negative: [1] Asthma and [2] having symptoms of asthma (cough, wheezing, etc)    Negative: MORE THAN A DOUBLE DOSE of a prescription or over-the-counter (OTC) drug    Negative: [1] DOUBLE DOSE (an extra dose or lesser amount) of over-the-counter (OTC) drug AND [2] any symptoms (e.g., dizziness, nausea, pain, sleepiness)    Negative: [1] DOUBLE DOSE (an extra dose or lesser amount) of prescription drug AND [2] any symptoms (e.g., dizziness, nausea, pain, sleepiness)    Negative: Took another person's prescription drug    Negative: [1] DOUBLE DOSE (an extra dose or lesser amount) of prescription drug AND [2] NO symptoms (Exception: a double dose of antibiotics)    Negative: Diabetes drug error or overdose (e.g., insulin or extra dose)    Negative: [1] Request for URGENT new prescription or refill of \"essential\" medication (i.e., likelihood of harm to patient if not taken) AND [2] triager unable to fill per unit policy    Negative: [1] Prescription not at pharmacy AND [2] was prescribed today by PCP    Negative: Pharmacy calling with prescription questions and triager unable to answer question    Negative: Caller has URGENT " medication question about med that PCP prescribed and triager unable to answer question    Negative: Caller has NON-URGENT medication question about med that PCP prescribed and triager unable to answer question    Negative: Caller requesting a NON-URGENT new prescription or refill and triager unable to refill per unit policy    Negative: Caller has medication question about med not prescribed by PCP and triager unable to answer question (e.g., compatibility with other med, storage)    Negative: [1] DOUBLE DOSE (an extra dose or lesser amount) of over-the-counter (OTC) drug AND [2] NO symptoms (all triage questions negative)    Negative: [1] DOUBLE DOSE (an extra dose or lesser amount) of antibiotic drug AND [2] NO symptoms (all triage questions negative)    Caller has medication question only, adult not sick, and triager answers question    Protocols used: [facility]

## 2018-10-24 ENCOUNTER — COMMUNICATION - HEALTHEAST (OUTPATIENT)
Dept: FAMILY MEDICINE | Facility: CLINIC | Age: 83
End: 2018-10-24

## 2018-10-25 ENCOUNTER — AMBULATORY - HEALTHEAST (OUTPATIENT)
Dept: CARE COORDINATION | Facility: CLINIC | Age: 83
End: 2018-10-25

## 2018-10-25 ENCOUNTER — COMMUNICATION - HEALTHEAST (OUTPATIENT)
Dept: NURSING | Facility: CLINIC | Age: 83
End: 2018-10-25

## 2018-10-25 ENCOUNTER — COMMUNICATION - HEALTHEAST (OUTPATIENT)
Dept: FAMILY MEDICINE | Facility: CLINIC | Age: 83
End: 2018-10-25

## 2018-10-25 ENCOUNTER — OFFICE VISIT - HEALTHEAST (OUTPATIENT)
Dept: FAMILY MEDICINE | Facility: CLINIC | Age: 83
End: 2018-10-25

## 2018-10-25 ENCOUNTER — AMBULATORY - HEALTHEAST (OUTPATIENT)
Dept: NURSING | Facility: CLINIC | Age: 83
End: 2018-10-25

## 2018-10-25 ENCOUNTER — HOME CARE/HOSPICE - HEALTHEAST (OUTPATIENT)
Dept: HOME HEALTH SERVICES | Facility: HOME HEALTH | Age: 83
End: 2018-10-25

## 2018-10-25 DIAGNOSIS — R82.998 DARK URINE: ICD-10-CM

## 2018-10-25 DIAGNOSIS — F51.01 PRIMARY INSOMNIA: ICD-10-CM

## 2018-10-25 DIAGNOSIS — M80.00XD AGE-RELATED OSTEOPOROSIS WITH CURRENT PATHOLOGICAL FRACTURE WITH ROUTINE HEALING, SUBSEQUENT ENCOUNTER: ICD-10-CM

## 2018-10-25 DIAGNOSIS — S32.040D CLOSED COMPRESSION FRACTURE OF L4 LUMBAR VERTEBRA WITH ROUTINE HEALING, SUBSEQUENT ENCOUNTER: ICD-10-CM

## 2018-10-25 DIAGNOSIS — R63.8 DECREASED ORAL INTAKE: ICD-10-CM

## 2018-10-25 LAB
ALBUMIN UR-MCNC: NEGATIVE MG/DL
ANION GAP SERPL CALCULATED.3IONS-SCNC: 10 MMOL/L (ref 5–18)
APPEARANCE UR: CLEAR
BACTERIA #/AREA URNS HPF: ABNORMAL HPF
BILIRUB UR QL STRIP: NEGATIVE
BUN SERPL-MCNC: 13 MG/DL (ref 8–28)
CALCIUM SERPL-MCNC: 9.2 MG/DL (ref 8.5–10.5)
CAOX CRY #/AREA URNS HPF: PRESENT /[HPF]
CHLORIDE BLD-SCNC: 103 MMOL/L (ref 98–107)
CO2 SERPL-SCNC: 24 MMOL/L (ref 22–31)
COLOR UR AUTO: YELLOW
CREAT SERPL-MCNC: 0.64 MG/DL (ref 0.6–1.1)
GFR SERPL CREATININE-BSD FRML MDRD: >60 ML/MIN/1.73M2
GLUCOSE BLD-MCNC: 154 MG/DL (ref 70–125)
GLUCOSE UR STRIP-MCNC: NEGATIVE MG/DL
HGB UR QL STRIP: ABNORMAL
KETONES UR STRIP-MCNC: NEGATIVE MG/DL
LEUKOCYTE ESTERASE UR QL STRIP: NEGATIVE
NITRATE UR QL: NEGATIVE
PH UR STRIP: 5 [PH] (ref 5–8)
POTASSIUM BLD-SCNC: 3.9 MMOL/L (ref 3.5–5)
RBC #/AREA URNS AUTO: ABNORMAL HPF
SODIUM SERPL-SCNC: 137 MMOL/L (ref 136–145)
SP GR UR STRIP: >=1.03 (ref 1–1.03)
SQUAMOUS #/AREA URNS AUTO: ABNORMAL LPF
UROBILINOGEN UR STRIP-ACNC: ABNORMAL
WBC #/AREA URNS AUTO: ABNORMAL HPF

## 2018-10-26 ENCOUNTER — AMBULATORY - HEALTHEAST (OUTPATIENT)
Dept: NURSING | Facility: CLINIC | Age: 83
End: 2018-10-26

## 2018-10-30 ENCOUNTER — COMMUNICATION - HEALTHEAST (OUTPATIENT)
Dept: FAMILY MEDICINE | Facility: CLINIC | Age: 83
End: 2018-10-30

## 2018-10-30 ENCOUNTER — AMBULATORY - HEALTHEAST (OUTPATIENT)
Dept: NURSING | Facility: CLINIC | Age: 83
End: 2018-10-30

## 2018-10-30 ENCOUNTER — COMMUNICATION - HEALTHEAST (OUTPATIENT)
Dept: NURSING | Facility: CLINIC | Age: 83
End: 2018-10-30

## 2018-10-31 ENCOUNTER — COMMUNICATION - HEALTHEAST (OUTPATIENT)
Dept: FAMILY MEDICINE | Facility: CLINIC | Age: 83
End: 2018-10-31

## 2018-11-21 ENCOUNTER — RECORDS - HEALTHEAST (OUTPATIENT)
Dept: ADMINISTRATIVE | Facility: OTHER | Age: 83
End: 2018-11-21

## 2018-11-26 ENCOUNTER — OFFICE VISIT (OUTPATIENT)
Dept: FAMILY MEDICINE | Facility: CLINIC | Age: 83
End: 2018-11-26
Payer: COMMERCIAL

## 2018-11-26 ENCOUNTER — RADIANT APPOINTMENT (OUTPATIENT)
Dept: GENERAL RADIOLOGY | Facility: CLINIC | Age: 83
End: 2018-11-26
Attending: NURSE PRACTITIONER
Payer: COMMERCIAL

## 2018-11-26 VITALS
HEART RATE: 84 BPM | TEMPERATURE: 98.3 F | DIASTOLIC BLOOD PRESSURE: 84 MMHG | WEIGHT: 147 LBS | RESPIRATION RATE: 18 BRPM | HEIGHT: 64 IN | BODY MASS INDEX: 25.1 KG/M2 | SYSTOLIC BLOOD PRESSURE: 136 MMHG

## 2018-11-26 DIAGNOSIS — S32.001D CLOSED BURST FRACTURE OF LUMBAR VERTEBRA WITH ROUTINE HEALING, SUBSEQUENT ENCOUNTER: ICD-10-CM

## 2018-11-26 DIAGNOSIS — S32.001D CLOSED BURST FRACTURE OF LUMBAR VERTEBRA WITH ROUTINE HEALING, SUBSEQUENT ENCOUNTER: Primary | ICD-10-CM

## 2018-11-26 DIAGNOSIS — E03.9 HYPOTHYROIDISM, UNSPECIFIED TYPE: ICD-10-CM

## 2018-11-26 LAB — TSH SERPL DL<=0.005 MIU/L-ACNC: 1 MU/L (ref 0.4–4)

## 2018-11-26 PROCEDURE — 99214 OFFICE O/P EST MOD 30 MIN: CPT | Performed by: NURSE PRACTITIONER

## 2018-11-26 PROCEDURE — 36415 COLL VENOUS BLD VENIPUNCTURE: CPT | Performed by: NURSE PRACTITIONER

## 2018-11-26 PROCEDURE — 84443 ASSAY THYROID STIM HORMONE: CPT | Performed by: NURSE PRACTITIONER

## 2018-11-26 PROCEDURE — 72100 X-RAY EXAM L-S SPINE 2/3 VWS: CPT | Mod: FY

## 2018-11-26 NOTE — PATIENT INSTRUCTIONS
"  Preventing Falls: Are You At Risk of Falling?     Ask for help to reduce risk of falling in your home.     As you get older, you're not as steady on your feet as you once were. And you may have health problems you didn't have when you were younger. So, it's not surprising that older people are more likely to trip and fall. Falling can be very serious. It can change your overall health and quality of life. That's why it's important to be aware of your own risk of falling.  The dangers of falling  Falls are one of the main causes of injury in people over age 65. An older person who falls may take longer to get better than a younger person. And, after a fall, an older person is more likely to have problems that don't go away. So, preventing falls can help you avoid serious health problems.  Are you at risk of falling?  Answer these questions to rate your level of risk.    Are you a woman?    Have you fallen or stumbled in the last year?    Are you over age 65?    Are you ever dizzy or lightheaded with standing?    Do you have a hard time getting in and out of the bathtub or on and off the toilet?    Do you lean on objects to help you get around? Or do you use a cane or walker?    Do you have vision or hearing problems? For example, do you need new glasses or hearing aids?    Do you have 2 or more long-lasting (chronic) medical conditions?    Do you take 3 or more medicines?    Have you felt depressed recently?    Have you had more trouble with your memory in recent months?    Are there hazards in your home that might cause you to fall, such as loose rugs or poor lighting?    Do you have a pet that jumps on you or might trip you?    Have you stopped getting regular exercise?    Do you have diabetes?     Do you have a neurologic disease, such as Parkinson or Alzheimer disease?     Do you drink alcohol?    Do you wear athletic shoes or slippers, or go barefoot at home?  You can help prevent falls  If you answered \"yes\" " to any of the above questions, take steps to reduce your risk of a fall. Monitoring health conditions and keeping walkways in your home free of clutter are just two ways. Changing is sometimes easier said than done. But keep in mind that even small changes can make you less likely to fall.  The fear of falling  It's normal to be scared of falling, especially if you've fallen before. But being afraid can actually make you more likely to fall. This is because:    Fear might cause you to become less active. Being less active can lead to a loss of strength and balance.    Fear can lead to isolation from others, depression, or the use of more medicines or alcohol. And all these things make falling even more likely.  To break the cycle, learn more about ways to avoid falling. As you take control, you may find yourself feeling less afraid.   Date Last Reviewed: 5/1/2017 2000-2018 [employer]. [address] [place] [address]. All rights reserved. This information is not intended as a substitute for professional medical care. Always follow your healthcare professional's instructions.        Understanding Lumbar Radiculopathy    Lumbar radiculopathy is irritation or inflammation of a nerve root in the low back. It causes symptoms that spread out from the back down one or both legs. To understand this condition, it helps to understand the parts of the spine:    Vertebrae. These are bones that stack to form the spine. The lumbar spine contains the 5 bottom vertebrae.    Disks. These are soft pads of tissue between the vertebrae. They act as shock absorbers for the spine.    Spinal canal. This is a tunnel formed within the stacked vertebrae. In the lumbar spine, nerves run through this canal.    Nerves. These branch off and leave the spinal canal, traveling out to parts of the body. As they leave the spinal canal, nerves pass through openings between the vertebrae. The nerve root is the part of the  nerve that is closest to the spinal canal.    Sciatic nerve. This is a large nerve formed from several nerve roots in the low back. This nerve extends down the back of the leg to the foot.  With lumbar radiculopathy, nerve roots in the low back become irritated. This leads to pain and symptoms. The sciatic nerve is commonly involved, so the condition is often called sciatica.  What causes lumbar radiculopathy?  Aging, injury, poor posture, extra body weight, and other issues can lead to problems in the low back. These problems may then irritate nerve roots. They include:    Damage to a disk in the lumbar spine. The damaged disk may then press on nearby nerve roots.    Degeneration from wear and tear, and aging. This can lead to narrowing (stenosis) of the openings between the vertebrae. The narrowed openings press on nerve roots as they leave the spinal canal.    Unstable spine. This is when a vertebra slips forward. It can then press on a nerve root.  Other, less common things can put pressure on nerves in the low back. These include diabetes, infection, or a tumor.  Symptoms of lumbar radiculopathy  These include:    Pain in the low back    Pain, numbness, tingling, or weakness that travels into the buttocks, hip, groin, or leg    Muscle spasms  Treatment for lumbar radiculopathy  In most cases, your healthcare provider will first try treatments that help relieve symptoms. These may include:    Prescription and over-the-counter pain medicines. These help relieve pain, swelling, and irritation.    Limits on positions and activities that increase pain. But lying in bed or avoiding all movement is only recommended for a short period of time.    Physical therapy, including exercises and stretches. This helps decrease pain and increase movement and function.    Steroid shots into the lower back. This may help relieve symptoms for a time.    Weight-loss program. If you are overweight, losing extra pounds may help  relieve symptoms.  In some cases, you may need surgery to fix the underlying problem. This depends on the cause, the symptoms, and how long the pain has lasted.  Possible complications  Over time, an irritated and inflamed nerve may become damaged. This may lead to long-lasting (permanent) numbness or weakness in your legs and feet. If symptoms change suddenly or get worse, be sure to let your healthcare provider know.  When to call your healthcare provider  Call your healthcare provider right away if you have any of these:    New pain or pain that gets worse    New or increasing weakness, tingling, or numbness in your leg or foot    Problems controlling your bladder or bowel   Date Last Reviewed: 3/10/2016    [id number] [employer]. [address] [place] [address], [place] [address]. All rights reserved. This information is not intended as a substitute for professional medical care. Always follow your healthcare professional's instructions.        Constipation (Adult)  Constipation means that you have bowel movements that are less frequent than usual. Stools often become very hard and difficult to pass.  Constipation is very common. At some point in life, it affects almost everyone. Since everyone's bowel habits are different, what is constipation to one person may not be to another. Your healthcare provider may do tests to diagnose constipation. It depends on what he or she finds when evaluating you.    Symptoms of constipation include:    Abdominal pain    Bloating    Vomiting    Painful bowel movements    Itching, swelling, bleeding, or pain around the anus  Causes  Constipation can have many causes. These include:    Diet low in fiber    Too much dairy    Not drinking enough liquids    Lack of exercise or physical activity (especially true for older adults)    Changes in lifestyle or daily routine, including pregnancy, aging, work, and travel    Frequent use or misuse of laxatives    Ignoring the urge to  have a bowel movement or delaying it until later    Medicines, such as certain prescription pain medicines, iron supplements, antacids, certain antidepressants, and calcium supplements    Diseases like irritable bowel syndrome, bowel obstructions, stroke, diabetes, thyroid disease, Parkinson disease, hemorrhoids, and colon cancer  Complications  Potential complications of constipation can include:    Hemorrhoids    Rectal bleeding from hemorrhoids or anal fissures (skin tears)    Hernias    Dependency on laxatives    Chronic constipation    Fecal impaction, a severe form of constipation in which a large amount of hard stool is in your rectum that you can't pass    Bowel obstruction or perforation  Home care  All treatment should be done after talking with your healthcare provider. This is especially true if you have another medical problems, are taking prescription medicines, or are an older adult. Treatment most often involves lifestyle changes. You may also need medicines. Your healthcare provider will tell you which will work best for you. Follow the advice below to help avoid this problem in the future.  Lifestyle changes  These lifestyle changes can help prevent constipation:    Diet. Eat a high-fiber diet, with fresh fruit and vegetables, and reduce dairy intake, meats, and processed foods    Fluids. It's important to get enough fluids each day. Drink plenty of water when you eat more fiber. If you are on diet that limits the amount of fluid you can have, talk about this with your healthcare provider.    Regular exercise. Check with your healthcare provider first.  Medicines  Take any medicines as directed. Some laxatives are safe to use only every now and then. Others can be taken on a regular basis. While laxatives don't cause bowel dependence, they are treating the symptoms. So your constipation may return if you don't make other changes. Talk with your healthcare provider or pharmacist if you have  questions.  Prescription pain medicines can cause constipation. If you are taking this kind of medicine, ask your healthcare provider if you should also take a stool softener.  Medicines you may take to treat constipation include:    Fiber supplements    Stool softeners    Laxatives    Enemas    Rectal suppositories  Follow-up care  Follow up with your healthcare provider if symptoms don't get better in the next few days. You may need to have more tests or see a specialist.  Call 911  Call 911 if any of these occur:    Trouble breathing    Stiff, rigid abdomen that is severely painful to touch    Confusion    Fainting or loss of consciousness    Rapid heart rate    Chest pain  When to seek medical advice  Call your healthcare provider right away if any of these occur:    Fever of 100.4 F (38 C) or higher, or as directed by your healthcare provider    Failure to resume normal bowel movements    Pain in your abdomen or back gets worse    Nausea or vomiting    Swelling in your abdomen    Blood in the stool    Black, tarry stool    Involuntary weight loss    Weakness  Date Last Reviewed: 6/1/2018 2000-2018 [employer]. [address] [place] [address], [place] [address]. All rights reserved. This information is not intended as a substitute for professional medical care. Always follow your healthcare professional's instructions.

## 2018-11-26 NOTE — PROGRESS NOTES
SUBJECTIVE:   Nimo Concepcion is a 88 year old female who presents to clinic today for the following health issues:    Constipation     Onset: about six months    Description:   Frequency of bowel movements: about once a day.  Stool consistency: soft formed    Progression of Symptoms:  improving    Accompanying Signs & Symptoms:  Abdominal pain (cramping?): no   Blood in stool: no   Rectal pain: no   Nausea/vomiting: no   Weight loss or gain: YES- weight loss   History:   History of abdominal surgery: no     Precipitating factors:   Recent use of narcotics, anticholinergics, calcium channel blockers, antacids, or iron supplements: no   Chronic Laxative Use: no          Therapies Tried and outcome: stool softener    Patient suffered a closed compression fracture of the fourth lumbar on 10/16/2018.  Does have a brace but continues to have some issues with her back has seen a specialist but has not started physical therapy are has not done any further intervention with her back patient was not a surgical candidate.      Problem list and histories reviewed & adjusted, as indicated.  Additional history: as documented    Patient Active Problem List   Diagnosis     Fall at home     Fall     History reviewed. No pertinent surgical history.    Social History   Substance Use Topics     Smoking status: Never Smoker     Smokeless tobacco: Never Used     Alcohol use No     History reviewed. No pertinent family history.      Current Outpatient Prescriptions   Medication Sig Dispense Refill     Acetaminophen (TYLENOL PO)        Cyanocobalamin (VITAMIN B-12 PO) Take 1 capsule by mouth daily       Pantoprazole Sodium (PROTONIX PO) Take 40 mg by mouth 2 times daily (before meals)        POTASSIUM CHLORIDE ER PO Take 20 mEq by mouth daily Three tabs daily (total dose 30mEq)        TRAZODONE HCL PO Take 200 mg by mouth At Bedtime        VERAPAMIL HCL ER PO 24HR CAPSULE Take 100 mg by mouth daily       No Known Allergies  Recent Labs  "  Lab Test  11/26/18   1213  07/14/18   1527  12/27/17   1128   05/22/14   0625   ALT   --   14  13   --   20   CR   --   0.43*  0.49*   < >  0.68   GFRESTIMATED   --   >90  >90   < >  82   GFRESTBLACK   --   >90  >90   < >  >90   POTASSIUM   --   3.3*  3.8   < >  4.0   TSH  1.00   --    --    --    --     < > = values in this interval not displayed.      BP Readings from Last 3 Encounters:   11/29/18 155/87   11/26/18 136/84   10/16/18 (!) 175/93    Wt Readings from Last 3 Encounters:   11/29/18 147 lb (66.7 kg)   11/26/18 147 lb (66.7 kg)   10/16/18 155 lb (70.3 kg)                    Reviewed and updated as needed this visit by clinical staff       Reviewed and updated as needed this visit by Provider         ROS:  Constitutional, HEENT, cardiovascular, pulmonary, gi and gu systems are negative, except as otherwise noted.    OBJECTIVE:     /84 (BP Location: Right arm, Cuff Size: Adult Regular)  Pulse 84  Temp 98.3  F (36.8  C) (Tympanic)  Resp 18  Ht 5' 4\" (1.626 m)  Wt 147 lb (66.7 kg)  BMI 25.23 kg/m2  Body mass index is 25.23 kg/(m^2).  GENERAL: healthy, alert and no distress  EYES: Eyes grossly normal to inspection, PERRL and conjunctivae and sclerae normal  HENT: ear canals and TM's normal, nose and mouth without ulcers or lesions  NECK: no adenopathy, no asymmetry, masses, or scars and thyroid normal to palpation  RESP: lungs clear to auscultation - no rales, rhonchi or wheezes  CV: regular rate and rhythm, normal S1 S2, no S3 or S4, no murmur, click or rub, no peripheral edema and peripheral pulses strong  ABDOMEN: soft, nontender, no hepatosplenomegaly, no masses and bowel sounds normal  MS: no gross musculoskeletal defects noted, no edema  SKIN: no suspicious lesions or rashes  NEURO: Normal strength and tone, mentation intact and speech normal  PSYCH: mentation appears normal, affect normal/bright    LUMBAR SPINE THREE VIEWS November 26, 2018 11:50 AM      HISTORY: L4 burst fracture on " 10/26/2018. Closed burst fracture of  lumbar vertebra with routine healing, subsequent encounter.     COMPARISON: 10/16/2018.         IMPRESSION: Compression deformity of L4 with vertebral plana. Height  loss increased compared to the prior CT scan. Retropulsed portions  appear similar. No new compression deformities. Multilevel  degenerative disc disease most pronounced at L5-S1. No listhesis. Mild  left apex curvature of the lumbar spine. Spurring of the SI joints  noted.      Tender:  lumbar spinous processes, lumbar facet joints, left para lumbar muscles, right para lumbar muscles  Non-tender:  thoracic spinous processes, thoracic facet joints, left parathoracic muscles, right parathoracic muscles, left SI joint, right SI joint, left sciatic notch, right sciatic notch  Range of Motion:  left lateral thoracic bending   decreased, right lateral thoracic bending  decreased, left thoracic rotation  decreased, right thoracic rotation  decreased, lumbar flexion  decreased, painful, lumbar extension  decreased, painful, left lateral lumbar bending  decreased, painful, right lateral lumbar bending  decreased, painful, left lateral lumbar rotation  decreased, painful, right lateral lumbar rotation  decreased, painful    ASSESSMENT/PLAN:   (S32.001D) Closed burst fracture of lumbar vertebra with routine healing, subsequent encounter  (primary encounter diagnosis)  Comment:   Plan: XR Lumbar Spine 2/3 Views, ORTHO          REFERRAL      (E03.9) Hypothyroidism, unspecified type  Comment:   Plan: TSH with free T4 reflex          Patient continues to have lumbar back pain post fracture.  Does wear her brace intermittently.  Concern for nonhealing fracture did obtain an x-ray which does show some increased height loss since the CT scan.  We will have her follow-up with spinal specialist nonsurgical for further evaluation   In intervention patient may need physical therapy but would want them to evaluate prior to  starting physical therapy.      NEHA Wilburn CNP  [facility]

## 2018-11-26 NOTE — MR AVS SNAPSHOT
After Visit Summary   11/26/2018    Nimo Concepcion    MRN: [id number]           Patient Information     Date Of Birth          4/11/1930        Visit Information        Provider Department      11/26/2018 11:00 AM Cheyanne Muhammad APRN [facility]        Today's Diagnoses     Closed burst fracture of lumbar vertebra with routine healing, subsequent encounter    -  1      Care Instructions      Preventing Falls: Are You At Risk of Falling?     Ask for help to reduce risk of falling in your home.     As you get older, you're not as steady on your feet as you once were. And you may have health problems you didn't have when you were younger. So, it's not surprising that older people are more likely to trip and fall. Falling can be very serious. It can change your overall health and quality of life. That's why it's important to be aware of your own risk of falling.  The dangers of falling  Falls are one of the main causes of injury in people over age 65. An older person who falls may take longer to get better than a younger person. And, after a fall, an older person is more likely to have problems that don't go away. So, preventing falls can help you avoid serious health problems.  Are you at risk of falling?  Answer these questions to rate your level of risk.    Are you a woman?    Have you fallen or stumbled in the last year?    Are you over age 65?    Are you ever dizzy or lightheaded with standing?    Do you have a hard time getting in and out of the bathtub or on and off the toilet?    Do you lean on objects to help you get around? Or do you use a cane or walker?    Do you have vision or hearing problems? For example, do you need new glasses or hearing aids?    Do you have 2 or more long-lasting (chronic) medical conditions?    Do you take 3 or more medicines?    Have you felt depressed recently?    Have you had more trouble with your memory in recent months?    Are there  "hazards in your home that might cause you to fall, such as loose rugs or poor lighting?    Do you have a pet that jumps on you or might trip you?    Have you stopped getting regular exercise?    Do you have diabetes?     Do you have a neurologic disease, such as Parkinson or Alzheimer disease?     Do you drink alcohol?    Do you wear athletic shoes or slippers, or go barefoot at home?  You can help prevent falls  If you answered \"yes\" to any of the above questions, take steps to reduce your risk of a fall. Monitoring health conditions and keeping walkways in your home free of clutter are just two ways. Changing is sometimes easier said than done. But keep in mind that even small changes can make you less likely to fall.  The fear of falling  It's normal to be scared of falling, especially if you've fallen before. But being afraid can actually make you more likely to fall. This is because:    Fear might cause you to become less active. Being less active can lead to a loss of strength and balance.    Fear can lead to isolation from others, depression, or the use of more medicines or alcohol. And all these things make falling even more likely.  To break the cycle, learn more about ways to avoid falling. As you take control, you may find yourself feeling less afraid.   Date Last Reviewed: 5/1/2017 2000-2018 [employer] [address] [place] [address]. All rights reserved. This information is not intended as a substitute for professional medical care. Always follow your healthcare professional's instructions.        Understanding Lumbar Radiculopathy    Lumbar radiculopathy is irritation or inflammation of a nerve root in the low back. It causes symptoms that spread out from the back down one or both legs. To understand this condition, it helps to understand the parts of the spine:    Vertebrae. These are bones that stack to form the spine. The lumbar spine contains the 5 bottom " vertebrae.    Disks. These are soft pads of tissue between the vertebrae. They act as shock absorbers for the spine.    Spinal canal. This is a tunnel formed within the stacked vertebrae. In the lumbar spine, nerves run through this canal.    Nerves. These branch off and leave the spinal canal, traveling out to parts of the body. As they leave the spinal canal, nerves pass through openings between the vertebrae. The nerve root is the part of the nerve that is closest to the spinal canal.    Sciatic nerve. This is a large nerve formed from several nerve roots in the low back. This nerve extends down the back of the leg to the foot.  With lumbar radiculopathy, nerve roots in the low back become irritated. This leads to pain and symptoms. The sciatic nerve is commonly involved, so the condition is often called sciatica.  What causes lumbar radiculopathy?  Aging, injury, poor posture, extra body weight, and other issues can lead to problems in the low back. These problems may then irritate nerve roots. They include:    Damage to a disk in the lumbar spine. The damaged disk may then press on nearby nerve roots.    Degeneration from wear and tear, and aging. This can lead to narrowing (stenosis) of the openings between the vertebrae. The narrowed openings press on nerve roots as they leave the spinal canal.    Unstable spine. This is when a vertebra slips forward. It can then press on a nerve root.  Other, less common things can put pressure on nerves in the low back. These include diabetes, infection, or a tumor.  Symptoms of lumbar radiculopathy  These include:    Pain in the low back    Pain, numbness, tingling, or weakness that travels into the buttocks, hip, groin, or leg    Muscle spasms  Treatment for lumbar radiculopathy  In most cases, your healthcare provider will first try treatments that help relieve symptoms. These may include:    Prescription and over-the-counter pain medicines. These help relieve pain,  swelling, and irritation.    Limits on positions and activities that increase pain. But lying in bed or avoiding all movement is only recommended for a short period of time.    Physical therapy, including exercises and stretches. This helps decrease pain and increase movement and function.    Steroid shots into the lower back. This may help relieve symptoms for a time.    Weight-loss program. If you are overweight, losing extra pounds may help relieve symptoms.  In some cases, you may need surgery to fix the underlying problem. This depends on the cause, the symptoms, and how long the pain has lasted.  Possible complications  Over time, an irritated and inflamed nerve may become damaged. This may lead to long-lasting (permanent) numbness or weakness in your legs and feet. If symptoms change suddenly or get worse, be sure to let your healthcare provider know.  When to call your healthcare provider  Call your healthcare provider right away if you have any of these:    New pain or pain that gets worse    New or increasing weakness, tingling, or numbness in your leg or foot    Problems controlling your bladder or bowel   Date Last Reviewed: 3/10/2016    [id number] [employer]. [address] [place] [address]. All rights reserved. This information is not intended as a substitute for professional medical care. Always follow your healthcare professional's instructions.        Constipation (Adult)  Constipation means that you have bowel movements that are less frequent than usual. Stools often become very hard and difficult to pass.  Constipation is very common. At some point in life, it affects almost everyone. Since everyone's bowel habits are different, what is constipation to one person may not be to another. Your healthcare provider may do tests to diagnose constipation. It depends on what he or she finds when evaluating you.    Symptoms of constipation include:    Abdominal  pain    Bloating    Vomiting    Painful bowel movements    Itching, swelling, bleeding, or pain around the anus  Causes  Constipation can have many causes. These include:    Diet low in fiber    Too much dairy    Not drinking enough liquids    Lack of exercise or physical activity (especially true for older adults)    Changes in lifestyle or daily routine, including pregnancy, aging, work, and travel    Frequent use or misuse of laxatives    Ignoring the urge to have a bowel movement or delaying it until later    Medicines, such as certain prescription pain medicines, iron supplements, antacids, certain antidepressants, and calcium supplements    Diseases like irritable bowel syndrome, bowel obstructions, stroke, diabetes, thyroid disease, Parkinson disease, hemorrhoids, and colon cancer  Complications  Potential complications of constipation can include:    Hemorrhoids    Rectal bleeding from hemorrhoids or anal fissures (skin tears)    Hernias    Dependency on laxatives    Chronic constipation    Fecal impaction, a severe form of constipation in which a large amount of hard stool is in your rectum that you can't pass    Bowel obstruction or perforation  Home care  All treatment should be done after talking with your healthcare provider. This is especially true if you have another medical problems, are taking prescription medicines, or are an older adult. Treatment most often involves lifestyle changes. You may also need medicines. Your healthcare provider will tell you which will work best for you. Follow the advice below to help avoid this problem in the future.  Lifestyle changes  These lifestyle changes can help prevent constipation:    Diet. Eat a high-fiber diet, with fresh fruit and vegetables, and reduce dairy intake, meats, and processed foods    Fluids. It's important to get enough fluids each day. Drink plenty of water when you eat more fiber. If you are on diet that limits the amount of fluid you can  have, talk about this with your healthcare provider.    Regular exercise. Check with your healthcare provider first.  Medicines  Take any medicines as directed. Some laxatives are safe to use only every now and then. Others can be taken on a regular basis. While laxatives don't cause bowel dependence, they are treating the symptoms. So your constipation may return if you don't make other changes. Talk with your healthcare provider or pharmacist if you have questions.  Prescription pain medicines can cause constipation. If you are taking this kind of medicine, ask your healthcare provider if you should also take a stool softener.  Medicines you may take to treat constipation include:    Fiber supplements    Stool softeners    Laxatives    Enemas    Rectal suppositories  Follow-up care  Follow up with your healthcare provider if symptoms don't get better in the next few days. You may need to have more tests or see a specialist.  Call 911  Call 911 if any of these occur:    Trouble breathing    Stiff, rigid abdomen that is severely painful to touch    Confusion    Fainting or loss of consciousness    Rapid heart rate    Chest pain  When to seek medical advice  Call your healthcare provider right away if any of these occur:    Fever of 100.4 F (38 C) or higher, or as directed by your healthcare provider    Failure to resume normal bowel movements    Pain in your abdomen or back gets worse    Nausea or vomiting    Swelling in your abdomen    Blood in the stool    Black, tarry stool    Involuntary weight loss    Weakness  Date Last Reviewed: 6/1/2018 2000-2018 [employer] [address] [place] [address]. All rights reserved. This information is not intended as a substitute for professional medical care. Always follow your healthcare professional's instructions.                Follow-ups after your visit        Additional Services     ORTHO [facility] is  referring you to the Orthopedic  Services at [facility].       The  Representative will assist you in the coordination of your Orthopedic and Musculoskeletal Care as prescribed by your physician.    The  Representative will call you within 1 business day to help schedule your appointment, or you may contact the  Representative at:    All areas ~ [phone]     Type of Referral : Spine: Lumbar: Medical Spine/Non-Surgical Specialist        Timeframe requested: 1 - 2 days    Coverage of these services is subject to the terms and limitations of your health insurance plan.  Please call member services at your health plan with any benefit or coverage questions.      If X-rays, CT or MRI's have been performed, please contact the facility where they were done to arrange for , prior to your scheduled appointment.  Please bring this referral request to your appointment and present it to your specialist.                  Who to contact     If you have questions or need follow up information about today's clinic visit or your schedule please contact [facility] directly at [phone].  Normal or non-critical lab and imaging results will be communicated to you by MyChart, letter or phone within 4 business days after the clinic has received the results. If you do not hear from us within 7 days, please contact the clinic through MyChart or phone. If you have a critical or abnormal lab result, we will notify you by phone as soon as possible.  Submit refill requests through [employer] or call your pharmacy and they will forward the refill request to us. Please allow 3 business days for your refill to be completed.          Additional Information About Your Visit        Care EveryWhere ID     This is your Care EveryWhere ID. This could be used by other organizations to access your [facility] medical records  [id number]        Your Vitals Were      "Pulse Temperature Respirations Height BMI (Body Mass Index)       84 98.3  F (36.8  C) (Tympanic) 18 5' 4\" (1.626 m) 25.23 kg/m2        Blood Pressure from Last 3 Encounters:   11/26/18 136/84   10/16/18 (!) 175/93   07/14/18 169/86    Weight from Last 3 Encounters:   11/26/18 147 lb (66.7 kg)   10/16/18 155 lb (70.3 kg)   07/14/18 155 lb (70.3 kg)              We Performed the Following     ORTHO  REFERRAL          Today's Medication Changes          These changes are accurate as of 11/26/18 12:09 PM.  If you have any questions, ask your nurse or doctor.               Stop taking these medicines if you haven't already. Please contact your care team if you have questions.     albuterol 108 (90 Base) MCG/ACT inhaler   Commonly known as:  PROAIR HFA/PROVENTIL HFA/VENTOLIN HFA   Stopped by:  Cheyanne Muhammad APRN CNP           BUPROPION HBR ER PO   Stopped by:  Cheyanne Muhammad APRN CNP                    Primary Care Provider Office Phone # Fax #    Katarina MARTINEZ Isaiah [phone]       [place] [id number]        Equal Access to Services     AVIS LACY : Hadii gauri escobedo hadasho Soomaali, waaxda luqadaha, qaybta kaalmada adeegyada, waxay idiin hayjoy flynn. So [facility] [phone].    ATENCIÓN: Si habla español, tiene a quintana disposición servicios gratuitos de asistencia lingüística. Bharath al [phone].    We comply with applicable federal civil rights laws and [place] laws. We do not discriminate on the basis of race, color, national origin, age, disability, sex, sexual orientation, or gender identity.            Thank you!     Thank you for choosing [facility]  for your care. Our goal is always to provide you with excellent care. Hearing back from our patients is one way we can continue to improve our services. Please take a few minutes to complete the written survey that you may receive in the mail after your visit with us. " Thank you!             Your Updated Medication List - Protect others around you: Learn how to safely use, store and throw away your medicines at www.disposemymeds.org.          This list is accurate as of 11/26/18 12:09 PM.  Always use your most recent med list.                   Brand Name Dispense Instructions for use Diagnosis    POTASSIUM CHLORIDE ER PO      Take 20 mEq by mouth daily Three tabs daily (total dose 30mEq)        PROTONIX PO      Take 40 mg by mouth 2 times daily (before meals)        TRAZODONE HCL PO      Take 200 mg by mouth At Bedtime        TYLENOL PO           VERAPAMIL HCL ER PO 24HR CAPSULE      Take 100 mg by mouth daily        VITAMIN B-12 PO      Take 1 capsule by mouth daily

## 2018-11-26 NOTE — NURSING NOTE
"Chief Complaint   Patient presents with     Constipation     Establish Care       Initial /84 (BP Location: Right arm, Cuff Size: Adult Regular)  Pulse 84  Temp 98.3  F (36.8  C) (Tympanic)  Resp 18  Ht 5' 4\" (1.626 m)  Wt 147 lb (66.7 kg)  BMI 25.23 kg/m2 Estimated body mass index is 25.23 kg/(m^2) as calculated from the following:    Height as of this encounter: 5' 4\" (1.626 m).    Weight as of this encounter: 147 lb (66.7 kg).    Patient presents to the clinic using No DME    Health Maintenance that is potentially due pending provider review:  NONE    Is there anyone who you would like to be able to receive your results? No  If yes have patient fill out BRADLY      "

## 2018-11-26 NOTE — LETTER
November 27, 2018      Nimo Concepcion  [address] [place] [id number]        Dear ,    We are writing to inform you of your test results.    Your test results fall within the expected range(s) or remain unchanged from previous results.  Please continue with current treatment plan.    Resulted Orders   TSH with free T4 reflex   Result Value Ref Range    TSH 1.00 0.40 - 4.00 mU/L       If you have any questions or concerns, please call the clinic at the number listed above.       Sincerely,        Cheyanne Muhammad, APRN CNP/ld

## 2018-11-27 ENCOUNTER — AMBULATORY - HEALTHEAST (OUTPATIENT)
Dept: NURSING | Facility: CLINIC | Age: 83
End: 2018-11-27

## 2018-11-29 ENCOUNTER — OFFICE VISIT (OUTPATIENT)
Dept: ORTHOPEDICS | Facility: CLINIC | Age: 83
End: 2018-11-29
Payer: COMMERCIAL

## 2018-11-29 ENCOUNTER — RECORDS - HEALTHEAST (OUTPATIENT)
Dept: ADMINISTRATIVE | Facility: OTHER | Age: 83
End: 2018-11-29

## 2018-11-29 VITALS
WEIGHT: 147 LBS | SYSTOLIC BLOOD PRESSURE: 155 MMHG | BODY MASS INDEX: 25.1 KG/M2 | HEIGHT: 64 IN | DIASTOLIC BLOOD PRESSURE: 87 MMHG

## 2018-11-29 DIAGNOSIS — M54.50 ACUTE BILATERAL LOW BACK PAIN WITHOUT SCIATICA: ICD-10-CM

## 2018-11-29 DIAGNOSIS — S32.040D CLOSED COMPRESSION FRACTURE OF L4 LUMBAR VERTEBRA WITH ROUTINE HEALING, SUBSEQUENT ENCOUNTER: Primary | ICD-10-CM

## 2018-11-29 PROCEDURE — 99203 OFFICE O/P NEW LOW 30 MIN: CPT | Performed by: FAMILY MEDICINE

## 2018-11-29 NOTE — PROGRESS NOTES
"Nimo Concepcion  :  1930  DOS: 2018  MRN: [id number]    Sports Medicine Clinic Visit    PCP: Katarina Colon    Nimo Concepcion is a 88 year old female who is seen in consultation at the request of  Cheyanne SOLOMON presenting with acute low back pain, lumbar compression fracture.    Injury: Fell forward, landing on face down ~ 6 weeks ago (10/16/18).  Overall pain has improved since her fall.  Pain located over bilateral lower lumbar spine, nonradiating.  Additional Features:  Positive: weakness.  Symptoms are better with Rest and physical therapy.  Symptoms are worse with: lumbar flexion, walking, going from sit to stand.  Other evaluation and/or treatments so far consists of: Tylenol, Other medications: Oxycodone, Rest and ER visit, PCP, home care physical therapy.  Recent imaging completed: CT completed 10/16/18, X-rays completed 18.  Prior History of related problems: none    Social History: retired - lives at home on her own    Review of Systems  Musculoskeletal: as above  Remainder of review of systems is negative including constitutional, CV, pulmonary, GI, Skin and Neurologic except as noted in HPI or medical history.    No past medical history on file.  No past surgical history on file.    Objective  /87  Ht 5' 4\" (1.626 m)  Wt 147 lb (66.7 kg)  BMI 25.23 kg/m2    General: healthy, alert and in no distress    HEENT: no scleral icterus or conjunctival erythema   Skin: no suspicious lesions or rash. No jaundice.   CV: regular rhythm by palpation, 2+ distal pulses, no pedal edema    Resp: normal respiratory effort without conversational dyspnea   Psych: normal mood and affect    Gait: nonantalgic, appropriate coordination and balance   Neuro: normal light touch sensory exam of the extremities. Motor strength as noted below     Low back exam:    Inspection:       no visible deformity in the low back, some loss of normal lordosis       normal skin       normal vascular       " normal lymphatic    ROM:       Modest but relatively comfortable flexion and extension    Tender:       midline       paraspinal muscles       Lower lumbar, R>L        R SI joint    Non Tender:       remainder of lumbar spine    Strength:       Motor function intact    Reflexes:       patellar (L3, L4) symmetric diminished       achilles tendons (S1) symmetric diminished    Sensation:      grossly intact throughout lower extremities    Skin:       well perfused       capillary refill brisk    Special tests:        slump test neg       Radiology:  CT LUMBAR SPINE WITHOUT CONTRAST 10/16/2018 3:46 PM      HISTORY: L4 comp fracture on x-ray; assess for stability/involvement  of posterior elements.      TECHNIQUE:  Axial images with reconstructions. Radiation dose for this  scan was reduced using automated exposure control, adjustment of the  mA and/or kV according to patient size, or iterative reconstruction  technique.     FINDINGS:  Osteopenia suspected. Multilevel degenerative disc disease  changes, greatest at L5-S1. Multilevel hyperostosis. Levoscoliosis.  There is a comminuted burst-type fracture of L4. There is  moderate-prominent loss of height. There is bony retropulsion which  extends about 60% of the spinal canal AP diameter. Therefore this  would not be a good candidate for vertebroplasty. There also appears  to be a subtle nondisplaced fracture of the right L4 pedicle (series 8  image 34). Bilateral sacroiliac joint degenerative change.     Findings by specific level:     There is multilevel bilateral facet arthropathy, including moderate as  prominent bilateral facet arthropathy at L4-L5. Incidentally noted  sacral Tarlov cyst formation.     There is disc bulging at each level from L1 to S1. There is central  stenosis as follows: Borderline L2-L3, mild L3-L4 disc level, moderate  at the level of the L4 retropulsion, mild L4-L5 (medial-lateral  dimension). No high-grade foraminal stenosis is seen at any  lumbar  level         IMPRESSION:  1. L4 burst fracture with moderate-prominent loss of height. Bony  retropulsion which extends 60% of the spinal canal AP diameter.  Fracture of the right L4 pedicle.  2. Multilevel degenerative disc and facet disease.  3. Mild central stenosis at L3-L4 and L4-L5.    Results for orders placed or performed in visit on 11/26/18   XR Lumbar Spine 2/3 Views    Narrative    LUMBAR SPINE THREE VIEWS November 26, 2018 11:50 AM     HISTORY: L4 burst fracture on 10/26/2018. Closed burst fracture of  lumbar vertebra with routine healing, subsequent encounter.    COMPARISON: 10/16/2018.      Impression    IMPRESSION: Compression deformity of L4 with vertebral plana. Height  loss increased compared to the prior CT scan. Retropulsed portions  appear similar. No new compression deformities. Multilevel  degenerative disc disease most pronounced at L5-S1. No listhesis. Mild  left apex curvature of the lumbar spine. Spurring of the SI joints  noted.     OLE BOBO MD         Assessment:  1. Closed compression fracture of L4 lumbar vertebra with routine healing, subsequent encounter    2. Acute bilateral low back pain without sciatica        Plan:  Discussed the assessment with the patient.  Follow up: 1 month, sooner if needed for worsening sx  Relative to her pathology, Nimo is doing quite well clinically  Her appearance of worsening on XR could be due to some absorption of bone  No radicular sx  Offered add'l PT in outpt setting, she defers for now  Continue HEP from home PT she had previously   Orthotics referral for soft wrap around brace, not tolerating clamshell TLSO  We discussed modified progressive pain-free activity as tolerated  Continue assistive device for now for safety and stability while walking  Home handouts provided and supportive care reviewed  All questions were answered today  Contact us with additional questions or concerns  Signs and sx of concern reviewed    Thanks  very much for sending this nice lady to us, I will keep you updated with her progress      Christopher Balderrama DO, CAQ  Primary Care Sports Medicine  [facility]             Disclaimer: This note consists of symbols derived from keyboarding, dictation and/or voice recognition software. As a result, there may be errors in the script that have gone undetected. Please consider this when interpreting information found in this chart.

## 2018-11-29 NOTE — MR AVS SNAPSHOT
After Visit Summary   11/29/2018    Nimo Concepcion    MRN: [id number]           Patient Information     Date Of Birth          4/11/1930        Visit Information        Provider Department      11/29/2018 9:20 AM Christopher Balderrama DO [facility]        Today's Diagnoses     Closed compression fracture of L4 lumbar vertebra with routine healing, subsequent encounter    -  1    Acute bilateral low back pain without sciatica          Care Instructions    Patient to follow up with Primary Care provider regarding elevated blood pressure.          Follow-ups after your visit        Additional Services     ORTHOTICS REFERRAL       **This referral order prints off in the [facility]  (Orthotics & Prosthetics) Central Scheduling Office**    The [facility] Central Scheduling Staff will contact the patient to schedule appointments.     Central Scheduling Contact Information: [phone] ([place])    Adjustable soft lumbar corset, work with Rodrigue Cerda    Please be aware that coverage of these services is subject to the terms and limitations of your health insurance plan.  Call member services at your health plan with any benefit or coverage questions.      Please bring the following to your appointment:    >>   Any x-rays, CTs or MRIs which have been performed.  Contact the facility where they were done to arrange for  prior to your scheduled appointment.    >>   List of current medications   >>   This referral request   >>   Any documents/labs given to you for this referral                  Who to contact     If you have questions or need follow up information about today's clinic visit or your schedule please contact [facility] directly at [phone].  Normal or non-critical lab and imaging results will be communicated to you by MyChart, letter or phone within 4 business days after the clinic has received  "the results. If you do not hear from us within 7 days, please contact the clinic through [employer] or phone. If you have a critical or abnormal lab result, we will notify you by phone as soon as possible.  Submit refill requests through [employer] or call your pharmacy and they will forward the refill request to us. Please allow 3 business days for your refill to be completed.          Additional Information About Your Visit        Care EveryWhere ID     This is your Care EveryWhere ID. This could be used by other organizations to access your [facility] medical records  [id number]        Your Vitals Were     Height BMI (Body Mass Index)                5' 4\" (1.626 m) 25.23 kg/m2           Blood Pressure from Last 3 Encounters:   11/29/18 155/87   11/26/18 136/84   10/16/18 (!) 175/93    Weight from Last 3 Encounters:   11/29/18 147 lb (66.7 kg)   11/26/18 147 lb (66.7 kg)   10/16/18 155 lb (70.3 kg)              We Performed the Following     ORTHOTICS REFERRAL        Primary Care Provider Office Phone # Fax #    Katarina MARTINEZ Isaiah [phone]       [facility] [id number] [place], [place] [address]        Equal Access to Services     MILES LACY : Hadii aad ku hadasho Sochasityali, waaxda luqadaha, qaybta kaalmada adeegyada, mishel flynn. So [facility] [phone].    ATENCIÓN: Si habla español, tiene a quintana disposición servicios gratuitos de asistencia lingüística. Jeremiahame al [phone].    We comply with applicable federal civil rights laws and [place] laws. We do not discriminate on the basis of race, color, national origin, age, disability, sex, sexual orientation, or gender identity.            Thank you!     Thank you for choosing [facility]  for your care. Our goal is always to provide you with excellent care. Hearing back from our patients is one way we can continue to improve our services. Please take a few minutes to complete the written " survey that you may receive in the mail after your visit with us. Thank you!             Your Updated Medication List - Protect others around you: Learn how to safely use, store and throw away your medicines at www.disposemymeds.org.          This list is accurate as of 11/29/18  2:07 PM.  Always use your most recent med list.                   Brand Name Dispense Instructions for use Diagnosis    POTASSIUM CHLORIDE ER PO      Take 20 mEq by mouth daily Three tabs daily (total dose 30mEq)        PROTONIX PO      Take 40 mg by mouth 2 times daily (before meals)        TRAZODONE HCL PO      Take 200 mg by mouth At Bedtime        TYLENOL PO           VERAPAMIL HCL ER PO 24HR CAPSULE      Take 100 mg by mouth daily        VITAMIN B-12 PO      Take 1 capsule by mouth daily

## 2018-11-29 NOTE — LETTER
"    2018         RE: Nimo Concepcion  [address] [facility] [address]        Dear Colleague,    Thank you for referring your patient, Nimo Concepcion, to the [facility] [place]. Please see a copy of my visit note below.    Nimo Concepcion  :  1930  DOS: 2018  MRN: [id number]    Sports Medicine Clinic Visit    PCP: Katarina Colon    Nimo Concepcion is a 88 year old female who is seen in consultation at the request of  Cheyanne SOLOMON presenting with acute low back pain, lumbar compression fracture.    Injury: Fell forward, landing on face down ~ 6 weeks ago (10/16/18).  Overall pain has improved since her fall.  Pain located over bilateral lower lumbar spine, nonradiating.  Additional Features:  Positive: weakness.  Symptoms are better with Rest and physical therapy.  Symptoms are worse with: lumbar flexion, walking, going from sit to stand.  Other evaluation and/or treatments so far consists of: Tylenol, Other medications: Oxycodone, Rest and ER visit, PCP, home care physical therapy.  Recent imaging completed: CT completed 10/16/18, X-rays completed 18.  Prior History of related problems: none    Social History: retired - lives at home on her own    Review of Systems  Musculoskeletal: as above  Remainder of review of systems is negative including constitutional, CV, pulmonary, GI, Skin and Neurologic except as noted in HPI or medical history.    No past medical history on file.  No past surgical history on file.    Objective  /87  Ht 5' 4\" (1.626 m)  Wt 147 lb (66.7 kg)  BMI 25.23 kg/m2    General: healthy, alert and in no distress    HEENT: no scleral icterus or conjunctival erythema   Skin: no suspicious lesions or rash. No jaundice.   CV: regular rhythm by palpation, 2+ distal pulses, no pedal edema    Resp: normal respiratory effort without conversational dyspnea   Psych: normal mood and affect    Gait: nonantalgic, appropriate " coordination and balance   Neuro: normal light touch sensory exam of the extremities. Motor strength as noted below     Low back exam:    Inspection:       no visible deformity in the low back, some loss of normal lordosis       normal skin       normal vascular       normal lymphatic    ROM:       Modest but relatively comfortable flexion and extension    Tender:       midline       paraspinal muscles       Lower lumbar, R>L        R SI joint    Non Tender:       remainder of lumbar spine    Strength:       Motor function intact    Reflexes:       patellar (L3, L4) symmetric diminished       achilles tendons (S1) symmetric diminished    Sensation:      grossly intact throughout lower extremities    Skin:       well perfused       capillary refill brisk    Special tests:        slump test neg       Radiology:  CT LUMBAR SPINE WITHOUT CONTRAST 10/16/2018 3:46 PM      HISTORY: L4 comp fracture on x-ray; assess for stability/involvement  of posterior elements.      TECHNIQUE:  Axial images with reconstructions. Radiation dose for this  scan was reduced using automated exposure control, adjustment of the  mA and/or kV according to patient size, or iterative reconstruction  technique.     FINDINGS:  Osteopenia suspected. Multilevel degenerative disc disease  changes, greatest at L5-S1. Multilevel hyperostosis. Levoscoliosis.  There is a comminuted burst-type fracture of L4. There is  moderate-prominent loss of height. There is bony retropulsion which  extends about 60% of the spinal canal AP diameter. Therefore this  would not be a good candidate for vertebroplasty. There also appears  to be a subtle nondisplaced fracture of the right L4 pedicle (series 8  image 34). Bilateral sacroiliac joint degenerative change.     Findings by specific level:     There is multilevel bilateral facet arthropathy, including moderate as  prominent bilateral facet arthropathy at L4-L5. Incidentally noted  sacral Tarlov cyst  formation.     There is disc bulging at each level from L1 to S1. There is central  stenosis as follows: Borderline L2-L3, mild L3-L4 disc level, moderate  at the level of the L4 retropulsion, mild L4-L5 (medial-lateral  dimension). No high-grade foraminal stenosis is seen at any lumbar  level         IMPRESSION:  1. L4 burst fracture with moderate-prominent loss of height. Bony  retropulsion which extends 60% of the spinal canal AP diameter.  Fracture of the right L4 pedicle.  2. Multilevel degenerative disc and facet disease.  3. Mild central stenosis at L3-L4 and L4-L5.    Results for orders placed or performed in visit on 11/26/18   XR Lumbar Spine 2/3 Views    Narrative    LUMBAR SPINE THREE VIEWS November 26, 2018 11:50 AM     HISTORY: L4 burst fracture on 10/26/2018. Closed burst fracture of  lumbar vertebra with routine healing, subsequent encounter.    COMPARISON: 10/16/2018.      Impression    IMPRESSION: Compression deformity of L4 with vertebral plana. Height  loss increased compared to the prior CT scan. Retropulsed portions  appear similar. No new compression deformities. Multilevel  degenerative disc disease most pronounced at L5-S1. No listhesis. Mild  left apex curvature of the lumbar spine. Spurring of the SI joints  noted.     OLE BOBO MD         Assessment:  1. Closed compression fracture of L4 lumbar vertebra with routine healing, subsequent encounter    2. Acute bilateral low back pain without sciatica        Plan:  Discussed the assessment with the patient.  Follow up: 1 month, sooner if needed for worsening sx  Relative to her pathology, Nimo is doing quite well clinically  Her appearance of worsening on XR could be due to some absorption of bone  No radicular sx  Offered add'l PT in outpt setting, she defers for now  Continue HEP from home PT she had previously   Orthotics referral for soft wrap around brace, not tolerating clamshell TLSO  We discussed modified progressive  pain-free activity as tolerated  Continue assistive device for now for safety and stability while walking  Home handouts provided and supportive care reviewed  All questions were answered today  Contact us with additional questions or concerns  Signs and sx of concern reviewed    Thanks very much for sending this nice lady to us, I will keep you updated with her progress      Christopher Balderrama DO, CAQ  Primary Care Sports Medicine  [facility]             Disclaimer: This note consists of symbols derived from keyboarding, dictation and/or voice recognition software. As a result, there may be errors in the script that have gone undetected. Please consider this when interpreting information found in this chart.        Again, thank you for allowing me to participate in the care of your patient.        Sincerely,        Christopher Balderrama DO

## 2018-12-03 PROBLEM — E03.9 HYPOTHYROIDISM, UNSPECIFIED TYPE: Status: ACTIVE | Noted: 2018-12-03

## 2018-12-06 ENCOUNTER — AMBULATORY - HEALTHEAST (OUTPATIENT)
Dept: NURSING | Facility: CLINIC | Age: 83
End: 2018-12-06

## 2018-12-13 ENCOUNTER — AMBULATORY - HEALTHEAST (OUTPATIENT)
Dept: NURSING | Facility: CLINIC | Age: 83
End: 2018-12-13

## 2018-12-19 ENCOUNTER — DOCUMENTATION ONLY (OUTPATIENT)
Dept: ORTHOPEDICS | Facility: CLINIC | Age: 83
End: 2018-12-19

## 2018-12-19 NOTE — PROGRESS NOTES
S.  Patient was seen today, at the [place] location for a fitting of a new OTS [place] 456 medium TLSO.    O.Goal.  To help support and stabilize the spine with maximum comfort and relieve her pain and muscle spasms. She came in today sitting in a wheel chair with her friend today.    A.  At this time, I have custom fit the [place] TLSO to the patients body the posterior thoracic section was adjusted and contoured to her body shape.  I have instructed the patient and her friend with proper donning, doffing, cleaning, and skin care/observation.  The manufacturers instructions were provided with the brace.  The patient was observed standing and sitting with the brace on and was satisfied with the overall fit/comfort.  She liked this brace much more than the custom brace provided previously.     P.  Pt. was instructed to contact our facility with any future questions and/or concerns.    Mauricio MEDRANO/MAYE

## 2018-12-26 ENCOUNTER — TELEPHONE (OUTPATIENT)
Dept: FAMILY MEDICINE | Facility: CLINIC | Age: 83
End: 2018-12-26

## 2018-12-26 NOTE — TELEPHONE ENCOUNTER
Cheyanne,   Daughter says that orthopedic at [facility] told her that her back was mush and she thought that Cheyanne said it was healing and the daughter is confused. Also says that the patient has been having constipation concerns, went yesterday but has been having bouts of waiting 2-3 days without going, did not report abdominal pain, has tried Miralax and stool softeners and still not seeing regularity.   Will route to PCP to advise further recommendations.    RAKESH Mendieta

## 2018-12-26 NOTE — TELEPHONE ENCOUNTER
Reason for call:  Patient reporting a symptom    Symptom or request:   1) Constipation- Per daughter pt has tried a lot of over the Counter. Please Advise as what she should do next.    2) Pt was sent to [facility] for a Back Brace. They told her her Back was Mush per Daughter. What is the next step for this as Cheyanne Muhammad told her she has a Fx in her back. Please Advise    Can we leave a detailed message on this number:  YES  [phone]    Call taken on 12/26/2018 at 2:50 PM by Maddy Fraire

## 2018-12-27 ENCOUNTER — AMBULATORY - HEALTHEAST (OUTPATIENT)
Dept: NURSING | Facility: CLINIC | Age: 83
End: 2018-12-27

## 2018-12-27 NOTE — TELEPHONE ENCOUNTER
Reviewed with daughter recommend patient continue over-the-counter constipation medications patient does not have current abdominal pain nausea vomiting and is eating.  If no improvement over the next 24 hours would probably have her see him to make sure we do not need to do a chest x-ray to see if this to his lower upper based on how effective the treatment will be.    Cheyanne Muhammad CNP

## 2019-01-03 ENCOUNTER — OFFICE VISIT (OUTPATIENT)
Dept: FAMILY MEDICINE | Facility: CLINIC | Age: 84
End: 2019-01-03
Payer: COMMERCIAL

## 2019-01-03 ENCOUNTER — ANCILLARY PROCEDURE (OUTPATIENT)
Dept: GENERAL RADIOLOGY | Facility: CLINIC | Age: 84
End: 2019-01-03
Payer: COMMERCIAL

## 2019-01-03 VITALS
DIASTOLIC BLOOD PRESSURE: 80 MMHG | TEMPERATURE: 98 F | WEIGHT: 146.6 LBS | SYSTOLIC BLOOD PRESSURE: 142 MMHG | OXYGEN SATURATION: 97 % | HEART RATE: 82 BPM | BODY MASS INDEX: 25.16 KG/M2

## 2019-01-03 DIAGNOSIS — R10.84 ABDOMINAL PAIN, GENERALIZED: ICD-10-CM

## 2019-01-03 DIAGNOSIS — K59.00 CONSTIPATION, UNSPECIFIED CONSTIPATION TYPE: Primary | ICD-10-CM

## 2019-01-03 PROCEDURE — 99214 OFFICE O/P EST MOD 30 MIN: CPT | Performed by: NURSE PRACTITIONER

## 2019-01-03 PROCEDURE — 74019 RADEX ABDOMEN 2 VIEWS: CPT

## 2019-01-03 NOTE — PATIENT INSTRUCTIONS
The following are suggestions and recommendations for this patient:    Information on constipation given to the patient  Metamucil or equivalent Sheryl lax, Prune juice    Go to Emergency Room if sx worsen or change; vomiting, bloody stools, fevers, severe abdominal pain occur.   .    Patient voiced understanding of instructions given.            Patient Education     Constipation (Adult)  Constipation means that you have bowel movements that are less frequent than usual. Stools often become very hard and difficult to pass.  Constipation is very common. At some point in life, it affects almost everyone. Since everyone's bowel habits are different, what is constipation to one person may not be to another. Your healthcare provider may do tests to diagnose constipation. It depends on what he or she finds when evaluating you.    Symptoms of constipation include:    Abdominal pain    Bloating    Vomiting    Painful bowel movements    Itching, swelling, bleeding, or pain around the anus  Causes  Constipation can have many causes. These include:    Diet low in fiber    Too much dairy    Not drinking enough liquids    Lack of exercise or physical activity (especially true for older adults)    Changes in lifestyle or daily routine, including pregnancy, aging, work, and travel    Frequent use or misuse of laxatives    Ignoring the urge to have a bowel movement or delaying it until later    Medicines, such as certain prescription pain medicines, iron supplements, antacids, certain antidepressants, and calcium supplements    Diseases like irritable bowel syndrome, bowel obstructions, stroke, diabetes, thyroid disease, Parkinson disease, hemorrhoids, and colon cancer  Complications  Potential complications of constipation can include:    Hemorrhoids    Rectal bleeding from hemorrhoids or anal fissures (skin tears)    Hernias    Dependency on laxatives    Chronic constipation    Fecal impaction, a severe form of constipation in  which a large amount of hard stool is in your rectum that you can't pass    Bowel obstruction or perforation  Home care  All treatment should be done after talking with your healthcare provider. This is especially true if you have another medical problems, are taking prescription medicines, or are an older adult. Treatment most often involves lifestyle changes. You may also need medicines. Your healthcare provider will tell you which will work best for you. Follow the advice below to help avoid this problem in the future.  Lifestyle changes  These lifestyle changes can help prevent constipation:    Diet. Eat a high-fiber diet, with fresh fruit and vegetables, and reduce dairy intake, meats, and processed foods    Fluids. It's important to get enough fluids each day. Drink plenty of water when you eat more fiber. If you are on diet that limits the amount of fluid you can have, talk about this with your healthcare provider.    Regular exercise. Check with your healthcare provider first.  Medicines  Take any medicines as directed. Some laxatives are safe to use only every now and then. Others can be taken on a regular basis. While laxatives don't cause bowel dependence, they are treating the symptoms. So your constipation may return if you don't make other changes. Talk with your healthcare provider or pharmacist if you have questions.  Prescription pain medicines can cause constipation. If you are taking this kind of medicine, ask your healthcare provider if you should also take a stool softener.  Medicines you may take to treat constipation include:    Fiber supplements    Stool softeners    Laxatives    Enemas    Rectal suppositories  Follow-up care  Follow up with your healthcare provider if symptoms don't get better in the next few days. You may need to have more tests or see a specialist.  Call 911  Call 911 if any of these occur:    Trouble breathing    Stiff, rigid abdomen that is severely painful to  touch    Confusion    Fainting or loss of consciousness    Rapid heart rate    Chest pain  When to seek medical advice  Call your healthcare provider right away if any of these occur:    Fever of 100.4 F (38 C) or higher, or as directed by your healthcare provider    Failure to resume normal bowel movements    Pain in your abdomen or back gets worse    Nausea or vomiting    Swelling in your abdomen    Blood in the stool    Black, tarry stool    Involuntary weight loss    Weakness  Date Last Reviewed: 6/1/2018 2000-2018 [employer]. [address] [place] [address]. All rights reserved. This information is not intended as a substitute for professional medical care. Always follow your healthcare professional's instructions.           Patient Education     Treating Constipation    Constipation is a common and often uncomfortable problem. Constipation means you have bowel movements fewer than 3 times per week, or strain to pass hard, dry stool. It can last a short time. Or it can be a problem that never seems to go away. The good news is that it can often be treated and controlled.  Eat more fiber  One of the best ways to help treat constipation is to increase your fiber intake. You can do this either through diet or by using fiber supplements. Fiber (in whole grains, fruits, and vegetables) adds bulk and absorbs water to soften the stool. This helps the stool pass through the colon more easily. When you increase your fiber intake, do it slowly to avoid side effects such as bloating. Also increase the amount of water that you drink. Eating more of the following foods can add fiber to your diet.    High-fiber cereals    Whole grains, bran, and brown rice    Vegetables such as carrots, broccoli, and greens    Fresh fruits (especially apples, pears, and dried fruits like raisins and apricots)    Nuts and legumes (especially beans such as lentils, kidney beans, and lima beans)  Get physically  active  Exercise helps improve the working of your colon which helps ease constipation. Try to get some physical activity every day. If you haven t been active for a while, talk to your healthcare provider before starting again.  Laxatives  Your healthcare provider may suggest an over-the-counter product to help ease your constipation. He or she may suggest the use of bulk-forming agents or laxatives. The use of laxatives, if used as directed, is common and safe. Follow directions carefully when using them. See your healthcare provider for new-onset constipation, or long-term constipation, to rule out other causes such as medicines or thyroid disease.  Date Last Reviewed: 7/1/2016 2000-2018 [employer]. [address] [place] [address], [place] [address]. All rights reserved. This information is not intended as a substitute for professional medical care. Always follow your healthcare professional's instructions.           Patient Education     Eating a High-Fiber Diet  Fiber is what gives strength and structure to plants. Most grains, beans, vegetables, and fruits contain fiber. Foods rich in fiber are often low in calories and fat, and they fill you up more. They may also reduce your risks for certain health problems. To find out the amount of fiber in canned, packaged, or frozen foods, read the Nutrition Facts label. It tells you how much fiber is in one serving.    Types of fiber and their benefits  There are two types of fiber: insoluble and soluble. They both aid digestion and help you maintain a healthy weight.    Insoluble fiber. This is found in whole grains, cereals, certain fruits and vegetables such as apple skin, corn, and carrots. Insoluble fiber may prevent constipation and reduce the risk for certain types of cancer. It is called insoluble because it does not dissolve in water.    Soluble fiber. This type of fiber is in oats, beans, and certain fruits and vegetables such as strawberries and  peas. Soluble fiber can reduce cholesterol, which may help lower the risk for heart disease. It also helps control blood sugar levels.  Look for high-fiber foods  Try these foods to add fiber to your diet:    Whole-grain breads and cereals. Try to eat 6 to 8 ounces a day. Include wheat and oat bran cereals, whole-wheat muffins or toast, and corn tortillas in your meals.    Fruits. Try to eat 2 cups a day. Apples, oranges, strawberries, pears, and bananas are good sources. (Note: Fruit juice is low in fiber.)    Vegetables. Try to eat at least 2.5 cups a day. Add asparagus, carrots, broccoli, peas, and corn to your meals.    Beans. One cup of cooked lentils gives you over 15 grams of fiber. Try navy beans, lentils, and chickpeas.    Seeds. A small handful of seeds gives you about 3 grams of fiber. Try sunflower or campos seeds.  Keep track of your fiber  Keep track of how much fiber you eat. Start by reading food labels. Then eat a variety of foods high in fiber. As you start to eat more fiber, ask your healthcare provider how much water you should be drinking to keep your digestive system working smoothly.  Aim for a certain amount of fiber in your diet each day. If you are a woman, that amount is between 25 and 28 grams per day. Men should aim for 30 to 33 grams per day. After age 50, your daily fiber needs drop to 22 grams for women and 28 grams for men.  Before you reach for the fiber supplements, think about this. Fiber is found naturally in healthy whole foods. It gives you that feeling of fullness after you eat. Taking fiber supplements or eating fiber-enriched foods will not give you this full feeling.  Your fiber intake is a good measure for the quality of your overall diet. If you are missing out on your daily amount of fiber, you may be lacking other important nutrients as well.  Date Last Reviewed: 6/1/2017 2000-2018 [employer]. [address] [place] [address], [place] [address]. All rights  reserved. This information is not intended as a substitute for professional medical care. Always follow your healthcare professional's instructions.           Patient Education     Discharge Instructions: Eating a High-Fiber Diet    Your healthcare provider has prescribed a high-fiber diet for you. Fiber is what gives strength and structure to plants. Most grains, beans, vegetables, and fruits contain fiber. Foods rich in fiber are often low in calories and fat, but they fill you up more. These foods may also reduce the risk of certain health problems.  There are two types of fiber:    Insoluble fiber. This is found in whole-grains, cereals, and certain fruits and vegetables (such as apple skins, corn, and beans). Insoluble fiber is made up mainly of plant cell walls. It may prevent constipation and reduce the risk of certain types of cancer.    Soluble fiber. This type of fiber is found in oats, beans, nuts, and certain fruits and vegetables (such as strawberries and peas). Soluble fiber turns to gel in the digestive system, slowing the movement of the digestive tract. It helps control blood sugar levels and can reduce cholesterol, which may help lower the risk of heart disease. Soluble fiber can also help control appetite.   Home care    Know how much fiber you need a day. The recommended daily amount of fiber is 25 grams for women and 38 grams for men. After age 50, daily fiber needs drop to 21 grams for women and 30 grams for men.    Ask your healthcare provider about a fiber supplement. (Always take fiber supplements with a large glass of water.)    Keep track of how much fiber you eat.    Eat a variety of foods high in fiber.    Learn to read and understand food labels.    Ask your healthcare provider how much water you should be drinking.    Look for these high-fiber foods:  ? Whole-grain breads and cereals    6 ounces a day give you about 18 grams of fiber (1 ounce is equal to 1 slice of bread, 1 cup of dry  cereal, or 1/2 cup of cooked rice).    Include wheat and oat bran cereals, whole-wheat muffins or toast, and corn tortillas in your meals.  ? Fruits     2 cups a day give you about 8 grams of fiber.    Apples, oranges, strawberries, pears, and bananas are good sources.    Fruit juice does not contain as much fiber as the fruit it was made from.  ? Vegetables    2  cups a day give you about 11 grams of fiber. Add asparagus, carrots, broccoli, peas, and corn to your meals.  ? Legumes    1/4 cup a day (in place of meat) gives you about 4 grams of fiber. Try navy beans, lentils, chickpeas, and soybeans.  ? Seeds     A small handful of seeds gives you about 3 grams of fiber. Try sunflower seeds.  Follow-up  Make a follow-up appointment, or as advised. Ask your healthcare provider if seeing a registered dietitian may help you plan a high fiber diet.  Date Last Reviewed: 6/1/2017 2000-2018 [employer]. [address] [place] [address], [place] [address]. All rights reserved. This information is not intended as a substitute for professional medical care. Always follow your healthcare professional's instructions.

## 2019-01-03 NOTE — PROGRESS NOTES
SUBJECTIVE:   Nimo Concepcion is a 88 year old female who presents to clinic today for the following health issues:    Constipation      Duration: About 3 months     Description:       Frequency of bowel movements: Can be up to every 4 days but has to use miralax or something every day       Consistency of stool: Formed    Intensity:  moderate, severe    Accompanying signs and symptoms: none       Abdominal pain: no        Rectal pain: no        Blood in stool: no        Nausea/vomitting: no     History:        Similar problems in past: no     Precipitating or alleviating factors: none       Medications worsening symptoms: YES- was on percocet for a short time    Therapies tried and outcome: Miralax, Stool softeners, prune juice,corectel        Chronic laxative use: no       Problem list and histories reviewed & adjusted, as indicated.  Additional history: as documented    Patient Active Problem List   Diagnosis     Fall at home     Fall     Hypothyroidism, unspecified type     History reviewed. No pertinent surgical history.    Social History     Tobacco Use     Smoking status: Never Smoker     Smokeless tobacco: Never Used   Substance Use Topics     Alcohol use: No     History reviewed. No pertinent family history.      Current Outpatient Medications   Medication Sig Dispense Refill     Acetaminophen (TYLENOL PO)        Cyanocobalamin (VITAMIN B-12 PO) Take 1 capsule by mouth daily       Pantoprazole Sodium (PROTONIX PO) Take 40 mg by mouth 2 times daily (before meals)        POTASSIUM CHLORIDE ER PO Take 20 mEq by mouth daily Three tabs daily (total dose 30mEq)        TRAZODONE HCL PO Take 200 mg by mouth At Bedtime        VERAPAMIL HCL ER PO 24HR CAPSULE Take 100 mg by mouth daily       No Known Allergies  Recent Labs   Lab Test 11/26/18  1213 07/14/18  1527 12/27/17  1128  05/22/14  0625   ALT  --  14 13  --  20   CR  --  0.43* 0.49*   < > 0.68   GFRESTIMATED  --  >90 >90   < > 82   GFRESTBLACK  --  >90 >90    < > >90   POTASSIUM  --  3.3* 3.8   < > 4.0   TSH 1.00  --   --   --   --     < > = values in this interval not displayed.      BP Readings from Last 3 Encounters:   01/03/19 142/80   11/29/18 155/87   11/26/18 136/84    Wt Readings from Last 3 Encounters:   01/03/19 66.5 kg (146 lb 9.6 oz)   11/29/18 66.7 kg (147 lb)   11/26/18 66.7 kg (147 lb)                    Reviewed and updated as needed this visit by clinical staff       Reviewed and updated as needed this visit by Provider         ROS:  Constitutional, HEENT, cardiovascular, pulmonary, gi and gu systems are negative, except as otherwise noted.    OBJECTIVE:     /80 (BP Location: Right arm, Patient Position: Sitting, Cuff Size: Adult Regular)   Pulse 82   Temp 98  F (36.7  C) (Tympanic)   Wt 66.5 kg (146 lb 9.6 oz)   SpO2 97%   BMI 25.16 kg/m    Body mass index is 25.16 kg/m .  GENERAL: healthy, alert and no distress  EYES: Eyes grossly normal to inspection, PERRL and conjunctivae and sclerae normal  HENT: ear canals and TM's normal, nose and mouth without ulcers or lesions  NECK: no adenopathy, no asymmetry, masses, or scars and thyroid normal to palpation  RESP: lungs clear to auscultation - no rales, rhonchi or wheezes  CV: regular rate and rhythm, normal S1 S2, no S3 or S4, no murmur, click or rub, no peripheral edema and peripheral pulses strong  ABDOMEN: soft, nontender, no hepatosplenomegaly, no masses and bowel sounds normal  MS: no gross musculoskeletal defects noted, no edema  SKIN: no suspicious lesions or rashes  NEURO: Normal strength and tone, mentation intact and speech normal  PSYCH: mentation appears normal, affect normal/bright  ABDOMEN TWO VIEWS 1/3/2019 10:35 AM      HISTORY: Concern for constipation versus obstruction. Abdominal pain,  generalized.     COMPARISON: None.                                                                      IMPRESSION: Nonobstructive gas pattern. Moderate volume of retained  colonic stool. No  organomegaly or abnormal calculus appreciated.  Degenerative changes noted of the lumbar spine and left hip.      ASSESSMENT/PLAN:   (K59.00) Constipation, unspecified constipation type  (primary encounter diagnosis)  Comment: Patient has noted that her bowel movements have increased to every 3 days used to be daily.  Did have some generalized abdominal pain that has resolved.  X-ray shows moderate constipation  Plan: Reviewed with patient patient to use over-the-counter MiraLAX and stool softeners should also increase her fiber intake and water intake.  Continue to monitor if not improving should return    (R10.84) Abdominal pain, generalized  Comment:   Plan: XR Abdomen 2 Views      Patient recently had a fracture of the vertebrae was working with orthopedics did not initially take a brace.  But will return to orthopedics for a brace.    NEHA Wilburn [facility]

## 2019-01-03 NOTE — NURSING NOTE
"Chief Complaint   Patient presents with     Constipation       Initial /80 (BP Location: Right arm, Patient Position: Sitting, Cuff Size: Adult Regular)   Pulse 82   Temp 98  F (36.7  C) (Tympanic)   Wt 66.5 kg (146 lb 9.6 oz)   SpO2 97%   BMI 25.16 kg/m   Estimated body mass index is 25.16 kg/m  as calculated from the following:    Height as of 11/29/18: 1.626 m (5' 4\").    Weight as of this encounter: 66.5 kg (146 lb 9.6 oz).    Patient presents to the clinic using No DME    Health Maintenance that is potentially due pending provider review:  NONE    n/a    Is there anyone who you would like to be able to receive your results? No  If yes have patient fill out BRADLY    Kira Quintero CMA    "

## 2019-01-07 ENCOUNTER — TELEPHONE (OUTPATIENT)
Dept: FAMILY MEDICINE | Facility: CLINIC | Age: 84
End: 2019-01-07

## 2019-01-07 NOTE — TELEPHONE ENCOUNTER
Reason for Call:  Other     Detailed comments: Wondering if she needs a release of information from [facility] for her mothers care. Prefers that Cheyanne talks with Lesli. Wasn't quite sure what she was asking but wanted to talk with Cheyanne          Phone Number Patient can be reached at: Lesli, daughter, [phone]    Best Time: any    Can we leave a detailed message on this number? YES    Call taken on 1/7/2019 at 11:23 AM by Cata Main

## 2019-01-09 NOTE — TELEPHONE ENCOUNTER
Daugher Shannon called-Number listed is incorrect. Pt's home number called and Lesli answered. She refused to tell me what questions she had and only wants to speak to Cheyanne Muhammad NP. Please call home number at [phone].Coleen Guthrie RN

## 2019-01-09 NOTE — TELEPHONE ENCOUNTER
Nimo called. States she is sorry for the way her daughter talked to me this am. She is wondering if we need a release of information for [facility]. Pt advised we have no signed consent that we can speak to her daughter about her care and that we can not give out information without it. Pt states she will have that signed the next time she is at the clinic. Coleen Guthrie RN

## 2019-01-10 NOTE — TELEPHONE ENCOUNTER
Reviewed with daughter.  Daughter would like [facility] for the back fracture.  Patient is seen by orthopedics.  Daughter is to follow-up with orthopedics for order.  If any problems daughter will contact me.    Cheyanne Muhammad CNP

## 2019-01-16 ENCOUNTER — APPOINTMENT (OUTPATIENT)
Dept: CT IMAGING | Facility: CLINIC | Age: 84
DRG: 690 | End: 2019-01-16
Payer: COMMERCIAL

## 2019-01-16 ENCOUNTER — RECORDS - HEALTHEAST (OUTPATIENT)
Dept: ADMINISTRATIVE | Facility: OTHER | Age: 84
End: 2019-01-16

## 2019-01-16 ENCOUNTER — HOSPITAL ENCOUNTER (INPATIENT)
Facility: CLINIC | Age: 84
LOS: 2 days | Discharge: NURSING, REHAB OR LONG-TERM CARE | DRG: 690 | End: 2019-01-18
Attending: NURSE PRACTITIONER | Admitting: FAMILY MEDICINE
Payer: COMMERCIAL

## 2019-01-16 ENCOUNTER — APPOINTMENT (OUTPATIENT)
Dept: GENERAL RADIOLOGY | Facility: CLINIC | Age: 84
DRG: 690 | End: 2019-01-16
Payer: COMMERCIAL

## 2019-01-16 DIAGNOSIS — N39.0 UTI (URINARY TRACT INFECTION) WITH PYURIA: ICD-10-CM
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
## 2019-01-16 DIAGNOSIS — R29.6 FALLS FREQUENTLY: ICD-10-CM

## 2019-01-16 DIAGNOSIS — J20.9 ACUTE BRONCHITIS, UNSPECIFIED ORGANISM: ICD-10-CM

## 2019-01-16 DIAGNOSIS — N10 ACUTE PYELONEPHRITIS: ICD-10-CM

## 2019-01-16 DIAGNOSIS — R53.1 WEAKNESS: ICD-10-CM

## 2019-01-16 PROBLEM — J18.9 CAP (COMMUNITY ACQUIRED PNEUMONIA): Status: ACTIVE | Noted: 2019-01-16

## 2019-01-16 PROBLEM — R42 LIGHTHEADEDNESS: Status: ACTIVE | Noted: 2019-01-16

## 2019-01-16 PROBLEM — M19.019 PRIMARY LOCALIZED OSTEOARTHROSIS OF SHOULDER REGION: Status: ACTIVE | Noted: 2019-01-16

## 2019-01-16 PROBLEM — M54.30 SCIATICA: Status: ACTIVE | Noted: 2019-01-16

## 2019-01-16 PROBLEM — E55.9 VITAMIN D DEFICIENCY: Status: ACTIVE | Noted: 2019-01-16

## 2019-01-16 PROBLEM — F33.1 MODERATE RECURRENT MAJOR DEPRESSION (H): Status: ACTIVE | Noted: 2019-01-16

## 2019-01-16 PROBLEM — F51.01 PRIMARY INSOMNIA: Status: ACTIVE | Noted: 2019-01-16

## 2019-01-16 PROBLEM — M25.519 PAIN IN JOINT, SHOULDER REGION: Status: ACTIVE | Noted: 2019-01-16

## 2019-01-16 PROBLEM — R53.81 OTHER MALAISE AND FATIGUE: Status: ACTIVE | Noted: 2019-01-16

## 2019-01-16 PROBLEM — M81.0 OSTEOPOROSIS: Status: ACTIVE | Noted: 2019-01-16

## 2019-01-16 PROBLEM — E78.00 PRIMARY HYPERCHOLESTEROLEMIA: Status: ACTIVE | Noted: 2019-01-16

## 2019-01-16 PROBLEM — C50.919 ADENOCARCINOMA OF BREAST (H): Status: ACTIVE | Noted: 2019-01-16

## 2019-01-16 PROBLEM — R53.83 OTHER MALAISE AND FATIGUE: Status: ACTIVE | Noted: 2019-01-16

## 2019-01-16 PROBLEM — K21.00 CHRONIC REFLUX ESOPHAGITIS: Status: ACTIVE | Noted: 2019-01-16

## 2019-01-16 PROBLEM — M48.10 DISH (DIFFUSE IDIOPATHIC SKELETAL HYPEROSTOSIS): Status: ACTIVE | Noted: 2018-06-07

## 2019-01-16 LAB
ALBUMIN SERPL-MCNC: 3.3 G/DL (ref 3.4–5)
ALBUMIN UR-MCNC: NEGATIVE MG/DL
ALP SERPL-CCNC: 82 U/L (ref 40–150)
ALT SERPL W P-5'-P-CCNC: 12 U/L (ref 0–50)
ANION GAP SERPL CALCULATED.3IONS-SCNC: 7 MMOL/L (ref 3–14)
APPEARANCE UR: ABNORMAL
AST SERPL W P-5'-P-CCNC: 10 U/L (ref 0–45)
BACTERIA #/AREA URNS HPF: ABNORMAL /HPF
BASOPHILS # BLD AUTO: 0 10E9/L (ref 0–0.2)
BASOPHILS NFR BLD AUTO: 0.3 %
BILIRUB SERPL-MCNC: 0.9 MG/DL (ref 0.2–1.3)
BILIRUB UR QL STRIP: NEGATIVE
BUN SERPL-MCNC: 17 MG/DL (ref 7–30)
CALCIUM SERPL-MCNC: 8.8 MG/DL (ref 8.5–10.1)
CHLORIDE SERPL-SCNC: 99 MMOL/L (ref 94–109)
CO2 SERPL-SCNC: 28 MMOL/L (ref 20–32)
COLOR UR AUTO: YELLOW
CREAT SERPL-MCNC: 0.52 MG/DL (ref 0.52–1.04)
DIFFERENTIAL METHOD BLD: ABNORMAL
EOSINOPHIL # BLD AUTO: 0 10E9/L (ref 0–0.7)
EOSINOPHIL NFR BLD AUTO: 0 %
ERYTHROCYTE [DISTWIDTH] IN BLOOD BY AUTOMATED COUNT: 12.9 % (ref 10–15)
FLUAV+FLUBV AG SPEC QL: NEGATIVE
FLUAV+FLUBV AG SPEC QL: NEGATIVE
GFR SERPL CREATININE-BSD FRML MDRD: 85 ML/MIN/{1.73_M2}
GLUCOSE SERPL-MCNC: 146 MG/DL (ref 70–99)
GLUCOSE UR STRIP-MCNC: NEGATIVE MG/DL
HCT VFR BLD AUTO: 41.1 % (ref 35–47)
HGB BLD-MCNC: 13.8 G/DL (ref 11.7–15.7)
HGB UR QL STRIP: NEGATIVE
IMM GRANULOCYTES # BLD: 0.1 10E9/L (ref 0–0.4)
IMM GRANULOCYTES NFR BLD: 0.5 %
KETONES UR STRIP-MCNC: NEGATIVE MG/DL
LACTATE BLD-SCNC: 1 MMOL/L (ref 0.7–2)
LEUKOCYTE ESTERASE UR QL STRIP: ABNORMAL
LYMPHOCYTES # BLD AUTO: 1.2 10E9/L (ref 0.8–5.3)
LYMPHOCYTES NFR BLD AUTO: 8.8 %
MCH RBC QN AUTO: 32.3 PG (ref 26.5–33)
MCHC RBC AUTO-ENTMCNC: 33.6 G/DL (ref 31.5–36.5)
MCV RBC AUTO: 96 FL (ref 78–100)
MONOCYTES # BLD AUTO: 1.5 10E9/L (ref 0–1.3)
MONOCYTES NFR BLD AUTO: 10.4 %
MUCOUS THREADS #/AREA URNS LPF: PRESENT /LPF
NEUTROPHILS # BLD AUTO: 11.2 10E9/L (ref 1.6–8.3)
NEUTROPHILS NFR BLD AUTO: 80 %
NITRATE UR QL: POSITIVE
NRBC # BLD AUTO: 0 10*3/UL
NRBC BLD AUTO-RTO: 0 /100
PH UR STRIP: 6 PH (ref 5–7)
PLATELET # BLD AUTO: 164 10E9/L (ref 150–450)
POTASSIUM SERPL-SCNC: 3.6 MMOL/L (ref 3.4–5.3)
PROT SERPL-MCNC: 6.7 G/DL (ref 6.8–8.8)
RBC # BLD AUTO: 4.27 10E12/L (ref 3.8–5.2)
RBC #/AREA URNS AUTO: 4 /HPF (ref 0–2)
SODIUM SERPL-SCNC: 134 MMOL/L (ref 133–144)
SOURCE: ABNORMAL
SP GR UR STRIP: 1.02 (ref 1–1.03)
SPECIMEN SOURCE: NORMAL
SQUAMOUS #/AREA URNS AUTO: <1 /HPF (ref 0–1)
UROBILINOGEN UR STRIP-MCNC: 2 MG/DL (ref 0–2)
WBC # BLD AUTO: 13.9 10E9/L (ref 4–11)
WBC #/AREA URNS AUTO: 49 /HPF (ref 0–5)

## 2019-01-16 PROCEDURE — 93010 ELECTROCARDIOGRAM REPORT: CPT | Mod: Z6 | Performed by: EMERGENCY MEDICINE

## 2019-01-16 PROCEDURE — 96375 TX/PRO/DX INJ NEW DRUG ADDON: CPT | Performed by: EMERGENCY MEDICINE

## 2019-01-16 PROCEDURE — 81001 URINALYSIS AUTO W/SCOPE: CPT | Performed by: NURSE PRACTITIONER

## 2019-01-16 PROCEDURE — 93005 ELECTROCARDIOGRAM TRACING: CPT | Performed by: EMERGENCY MEDICINE

## 2019-01-16 PROCEDURE — 70450 CT HEAD/BRAIN W/O DYE: CPT

## 2019-01-16 PROCEDURE — 12000000 ZZH R&B MED SURG/OB

## 2019-01-16 PROCEDURE — 80053 COMPREHEN METABOLIC PANEL: CPT | Performed by: NURSE PRACTITIONER

## 2019-01-16 PROCEDURE — 87186 SC STD MICRODIL/AGAR DIL: CPT | Performed by: NURSE PRACTITIONER

## 2019-01-16 PROCEDURE — 96365 THER/PROPH/DIAG IV INF INIT: CPT | Performed by: EMERGENCY MEDICINE

## 2019-01-16 PROCEDURE — 99285 EMERGENCY DEPT VISIT HI MDM: CPT | Mod: 25 | Performed by: EMERGENCY MEDICINE

## 2019-01-16 PROCEDURE — 87633 RESP VIRUS 12-25 TARGETS: CPT | Performed by: NURSE PRACTITIONER

## 2019-01-16 PROCEDURE — 87804 INFLUENZA ASSAY W/OPTIC: CPT | Performed by: NURSE PRACTITIONER

## 2019-01-16 PROCEDURE — 25000128 H RX IP 250 OP 636: Performed by: FAMILY MEDICINE

## 2019-01-16 PROCEDURE — 87088 URINE BACTERIA CULTURE: CPT | Performed by: NURSE PRACTITIONER

## 2019-01-16 PROCEDURE — 71046 X-RAY EXAM CHEST 2 VIEWS: CPT

## 2019-01-16 PROCEDURE — 99223 1ST HOSP IP/OBS HIGH 75: CPT | Mod: AI | Performed by: FAMILY MEDICINE

## 2019-01-16 PROCEDURE — 85025 COMPLETE CBC W/AUTO DIFF WBC: CPT | Performed by: NURSE PRACTITIONER

## 2019-01-16 PROCEDURE — 87086 URINE CULTURE/COLONY COUNT: CPT | Performed by: NURSE PRACTITIONER

## 2019-01-16 PROCEDURE — 25000128 H RX IP 250 OP 636: Performed by: NURSE PRACTITIONER

## 2019-01-16 PROCEDURE — 25000132 ZZH RX MED GY IP 250 OP 250 PS 637: Performed by: FAMILY MEDICINE

## 2019-01-16 PROCEDURE — 83605 ASSAY OF LACTIC ACID: CPT | Performed by: NURSE PRACTITIONER

## 2019-01-16 PROCEDURE — 96361 HYDRATE IV INFUSION ADD-ON: CPT | Performed by: EMERGENCY MEDICINE

## 2019-01-16 RX ORDER — NALOXONE HYDROCHLORIDE 0.4 MG/ML
.1-.4 INJECTION, SOLUTION INTRAMUSCULAR; INTRAVENOUS; SUBCUTANEOUS
Status: DISCONTINUED | OUTPATIENT
Start: 2019-01-16 | End: 2019-01-18 | Stop reason: HOSPADM

## 2019-01-16 RX ORDER — NALOXONE HYDROCHLORIDE 0.4 MG/ML
.1-.4 INJECTION, SOLUTION INTRAMUSCULAR; INTRAVENOUS; SUBCUTANEOUS
Status: DISCONTINUED | OUTPATIENT
Start: 2019-01-16 | End: 2019-01-16

## 2019-01-16 RX ORDER — CEFTRIAXONE SODIUM 2 G/50ML
2 INJECTION, SOLUTION INTRAVENOUS EVERY 24 HOURS
Status: DISCONTINUED | OUTPATIENT
Start: 2019-01-16 | End: 2019-01-18 | Stop reason: HOSPADM

## 2019-01-16 RX ORDER — ALENDRONATE SODIUM 70 MG/1
70 TABLET ORAL
COMMUNITY
End: 2019-03-04

## 2019-01-16 RX ORDER — SODIUM CHLORIDE 9 MG/ML
INJECTION, SOLUTION INTRAVENOUS CONTINUOUS
Status: DISCONTINUED | OUTPATIENT
Start: 2019-01-16 | End: 2019-01-17

## 2019-01-16 RX ORDER — VERAPAMIL HYDROCHLORIDE 120 MG/1
120 TABLET, FILM COATED, EXTENDED RELEASE ORAL DAILY
Status: DISCONTINUED | OUTPATIENT
Start: 2019-01-16 | End: 2019-01-18 | Stop reason: HOSPADM

## 2019-01-16 RX ORDER — PANTOPRAZOLE SODIUM 40 MG/1
40 TABLET, DELAYED RELEASE ORAL
Status: DISCONTINUED | OUTPATIENT
Start: 2019-01-16 | End: 2019-01-18 | Stop reason: HOSPADM

## 2019-01-16 RX ORDER — HYDROMORPHONE HCL/0.9% NACL/PF 0.2MG/0.2
0.2 SYRINGE (ML) INTRAVENOUS
Status: DISCONTINUED | OUTPATIENT
Start: 2019-01-16 | End: 2019-01-18 | Stop reason: HOSPADM

## 2019-01-16 RX ORDER — POTASSIUM CHLORIDE 750 MG/1
30 TABLET, EXTENDED RELEASE ORAL DAILY
Status: DISCONTINUED | OUTPATIENT
Start: 2019-01-16 | End: 2019-01-18 | Stop reason: HOSPADM

## 2019-01-16 RX ORDER — HYDROCODONE BITARTRATE AND ACETAMINOPHEN 5; 325 MG/1; MG/1
1-2 TABLET ORAL EVERY 4 HOURS PRN
Status: DISCONTINUED | OUTPATIENT
Start: 2019-01-16 | End: 2019-01-18 | Stop reason: HOSPADM

## 2019-01-16 RX ORDER — ONDANSETRON 4 MG/1
4 TABLET, ORALLY DISINTEGRATING ORAL EVERY 6 HOURS PRN
Status: DISCONTINUED | OUTPATIENT
Start: 2019-01-16 | End: 2019-01-18 | Stop reason: HOSPADM

## 2019-01-16 RX ORDER — TRAZODONE HYDROCHLORIDE 100 MG/1
100 TABLET ORAL AT BEDTIME
Status: DISCONTINUED | OUTPATIENT
Start: 2019-01-16 | End: 2019-01-18 | Stop reason: HOSPADM

## 2019-01-16 RX ORDER — ONDANSETRON 2 MG/ML
4 INJECTION INTRAMUSCULAR; INTRAVENOUS EVERY 6 HOURS PRN
Status: DISCONTINUED | OUTPATIENT
Start: 2019-01-16 | End: 2019-01-18 | Stop reason: HOSPADM

## 2019-01-16 RX ORDER — VERAPAMIL HYDROCHLORIDE 120 MG/1
120 CAPSULE, EXTENDED RELEASE ORAL DAILY
Status: DISCONTINUED | OUTPATIENT
Start: 2019-01-16 | End: 2019-01-16 | Stop reason: CLARIF

## 2019-01-16 RX ADMIN — SODIUM CHLORIDE: 9 INJECTION, SOLUTION INTRAVENOUS at 17:31

## 2019-01-16 RX ADMIN — TRAZODONE HYDROCHLORIDE 100 MG: 100 TABLET ORAL at 21:57

## 2019-01-16 RX ADMIN — POTASSIUM CHLORIDE 30 MEQ: 750 TABLET, FILM COATED, EXTENDED RELEASE ORAL at 17:28

## 2019-01-16 RX ADMIN — HYDROCODONE BITARTRATE AND ACETAMINOPHEN 1 TABLET: 5; 325 TABLET ORAL at 18:31

## 2019-01-16 RX ADMIN — PANTOPRAZOLE SODIUM 40 MG: 40 TABLET, DELAYED RELEASE ORAL at 17:28

## 2019-01-16 RX ADMIN — VERAPAMIL HYDROCHLORIDE 120 MG: 120 TABLET, FILM COATED, EXTENDED RELEASE ORAL at 17:28

## 2019-01-16 RX ADMIN — SODIUM CHLORIDE 500 MG: 9 INJECTION, SOLUTION INTRAVENOUS at 14:31

## 2019-01-16 RX ADMIN — SODIUM CHLORIDE 500 ML: 9 INJECTION, SOLUTION INTRAVENOUS at 12:16

## 2019-01-16 RX ADMIN — CEFTRIAXONE SODIUM 2 G: 2 INJECTION, SOLUTION INTRAVENOUS at 13:45

## 2019-01-16 ASSESSMENT — ENCOUNTER SYMPTOMS
CHILLS: 0
BACK PAIN: 1
FEVER: 0
LIGHT-HEADEDNESS: 1
SHORTNESS OF BREATH: 0
WEAKNESS: 1
SORE THROAT: 0
VOMITING: 0
HEADACHES: 0
ABDOMINAL PAIN: 0
NAUSEA: 0
WHEEZING: 0
DYSURIA: 0
FREQUENCY: 1
COUGH: 1
CONSTIPATION: 1
DIZZINESS: 0

## 2019-01-16 ASSESSMENT — ACTIVITIES OF DAILY LIVING (ADL)
ADLS_ACUITY_SCORE: 24
ADLS_ACUITY_SCORE: 24

## 2019-01-16 ASSESSMENT — MIFFLIN-ST. JEOR: SCORE: 1071

## 2019-01-16 NOTE — PROGRESS NOTES
Writer completed baseline admission assessment with primary nurse Jocelyn CAMERON. Noted bruise right lower back, red patchy area nape of neck ( to reassess during hospital stay), scratches on legs, buttock red ? Bruised.

## 2019-01-16 NOTE — PROGRESS NOTES
"[place] [facility] ADMISSION NOTE    Patient admitted to room 2304 at approximately 1455 via cart from emergency room. Patient was accompanied by other: ERT.     Verbal SBAR report received from Andreia CHRISTOPHER RN prior to patient arrival.     Patient ambulated to bed with stand-by assist. Patient alert and oriented X 3. The patient is not having any pain. 0-10 Pain Scale: 6(rt side pain ). Admission vital signs: Blood pressure 174/77, pulse 90, temperature 98.6  F (37  C), temperature source Oral, resp. rate 20, height 1.626 m (5' 4\"), weight 65.6 kg (144 lb 10 oz), SpO2 94 %. Patient was oriented to plan of care, bed controls, tv, telephone, bathroom and visiting hours.     Risk Assessment    The following safety risks were identified during admission: fall. Yellow risk band applied: YES.     Skin Initial Assessment    This writer admitted this patient and completed a full skin assessment and Alexys score in the Adult PCS flowsheet. Appropriate interventions initiated as needed.     Secondary skin check completed by Echo MORGAN RN.         Jocelyn Torres    "

## 2019-01-16 NOTE — LETTER
Transition Communication Hand-off for Care Transitions to Next Level of Care Provider    Name: Nimo Concepcion  : 1930  MRN #: [id number]  Primary Care Provider: Katarina Colon  Primary Care MD Name: Katarina Colon MD  Primary Clinic: [facility] [address] [facility] [address]  [facility] [address]  Primary Care Clinic Name: [facility]  Reason for Hospitalization:  Cough [R05]  Weakness [R53.1]  Falls frequently [R29.6]  UTI (urinary tract infection) with pyuria [N39.0]  Admit Date/Time: 2019 10:28 AM  Discharge Date: 19  Payor Source: Payor: UCARE / Plan: [employer] FOR [employer] / Product Type: HMO /     Readmission Assessment Measure (SANDOR) Risk Score/category: average    Reason for Communication Hand-off Referral: Fragility    Discharge Plan:TCU       Concern for non-adherence with plan of care:   Y/N no  Discharge Needs Assessment:  Needs      Most Recent Value   Home Care  [facility] [phone], Fax: [phone]        Follow-up plan:  No future appointments.        Key Recommendations:  Pt is discharging today to [facility] (Phone: [phone] Admissions: [phone] Fax: [phone]). She has support from family and her goal will be to return home when stable.     Alyce Hammond MSW, LICSW, [phone]    AVS/Discharge Summary is the source of truth; this is a helpful guide for improved communication of patient story

## 2019-01-16 NOTE — ED NOTES
"Pt fell one time during the night and one time this morning.  Pt states \"my legs just give out.  I'm so weak.\"  Daughter states she hit her head but no LOC.  Pt A&O x3.  Pt denies head or neck pain.  Denies any other injury.  Pt has had cough for 3 weeks.  Pt has chronic rt side pain due to fractured vertebrae in October.  Pt on coumadin.  "

## 2019-01-16 NOTE — ED PROVIDER NOTES
"  History     Chief Complaint   Patient presents with     Fall     fell x2 since last night.  pt c/o \"feeling weak\"  no cp or soa.  pt states she hit back of head but denies head or neck pain     HPI  Nimo Concepcion is a 88 year old female with history of frequent falls, adenocarcinoma of breast, chronic reflux esophagitis, depression, diffuse idiopathic skeletal hyperostosis, insomnia, hypercholesterolemia, and hypothyroidism who presents to the emergency department accompanied by her daughters for evaluation of cough, weakness, and falling.  Patient has had frequent coughing, nonproductive for the last week.  Over the last couple of days patient has been increasingly weak and reporting that she feels like \"her legs are giving out\".  Patient suffered a fall last evening, daughter in her room and heard her fall.  EMS was called and evaluated the patient but she appeared stable in no distress.  This morning patient ambulated to the bathroom and again the daughter heard her fall.  Patient states she hit the back of her head.  Denies LOC.  Denies neck pain.  Complains of right side pain for the last few days.  Patient is a non-smoker.  Patient's daughter has been staying with her since last October after patient had a fall and suffered a compression fracture of her L4 spine.  Patient has a back brace provided by orthopedics at home, but does not wear the brace because it is painful.    Allergies:  No Known Allergies    Problem List:    Patient Active Problem List    Diagnosis Date Noted     Adenocarcinoma of breast (H) 01/16/2019     Priority: Medium     Chronic reflux esophagitis 01/16/2019     Priority: Medium     Lightheadedness 01/16/2019     Priority: Medium     Moderate recurrent major depression (H) 01/16/2019     Priority: Medium     Osteoporosis 01/16/2019     Priority: Medium     Other malaise and fatigue 01/16/2019     Priority: Medium     Pain in joint, shoulder region 01/16/2019     Priority: Medium     " Primary hypercholesterolemia 01/16/2019     Priority: Medium     Primary insomnia 01/16/2019     Priority: Medium     Primary localized osteoarthrosis of shoulder region 01/16/2019     Priority: Medium     Sciatica 01/16/2019     Priority: Medium     Vitamin D deficiency 01/16/2019     Priority: Medium     UTI (urinary tract infection) 01/16/2019     Priority: Medium     CAP (community acquired pneumonia) 01/16/2019     Priority: Medium     Hypothyroidism, unspecified type 12/03/2018     Priority: Medium     DISH (diffuse idiopathic skeletal hyperostosis) 06/07/2018     Priority: Medium     Fall at home 05/21/2014     Priority: Medium     Fall 05/21/2014     Priority: Medium        Past Medical History:    No past medical history on file.    Past Surgical History:    No past surgical history on file.    Family History:    No family history on file.    Social History:  Marital Status:   [5]  Social History     Tobacco Use     Smoking status: Never Smoker     Smokeless tobacco: Never Used   Substance Use Topics     Alcohol use: No     Drug use: No        Medications:      No current outpatient medications on file.  Allergies:   No Known Allergies    Review of Systems   Constitutional: Negative for chills and fever.   HENT: Positive for congestion. Negative for sore throat.    Respiratory: Positive for cough. Negative for shortness of breath and wheezing.    Cardiovascular: Negative for chest pain and leg swelling.   Gastrointestinal: Positive for constipation. Negative for abdominal pain, nausea and vomiting.   Genitourinary: Positive for frequency. Negative for dysuria.   Musculoskeletal: Positive for back pain.   Skin: Negative.    Neurological: Positive for weakness and light-headedness. Negative for dizziness, syncope and headaches.   Psychiatric/Behavioral: Negative.    All other systems reviewed and are negative.      Physical Exam   BP: 151/73  Pulse: 81  Heart Rate: 81  Temp: 97.9  F (36.6  C)  Resp:  "16  Height: 162.6 cm (5' 4\")  Weight: 65.6 kg (144 lb 10 oz)  SpO2: 94 %      Physical Exam   Constitutional: She is oriented to person, place, and time. She appears well-developed and well-nourished. No distress.   HENT:   Head: Normocephalic and atraumatic.   Right Ear: External ear normal.   Left Ear: External ear normal.   Nose: Nose normal.   Mouth/Throat: Oropharynx is clear and moist.   Very [place], has hearing aides at home.    Eyes: Conjunctivae are normal.   Cardiovascular: Normal rate, regular rhythm and normal heart sounds.   No murmur heard.  Pulmonary/Chest: Effort normal. No tachypnea. No respiratory distress. She has decreased breath sounds in the right lower field and the left lower field.   Abdominal: Soft. Bowel sounds are normal. She exhibits no distension. There is no tenderness.   Musculoskeletal: Normal range of motion.   Neurological: She is alert and oriented to person, place, and time.   Skin:   Ecchymosis to the right back, lower.  [place] area erythema, pad was soaking wet with urine.       ED Course        Procedures               EKG Interpretation:      Interpreted by Dr. Tran  Time reviewed: 1219  Symptoms at time of EKG: sinus   Rhythm: normal sinus   Rate: normal  Axis: left  Ectopy: none  Conduction: poor R-wave progression. (nonspecific)  ST Segments/ T Waves: No acute ST-T wave changes  Q Waves: none  Comparison to prior: Unchanged from 07/14/2018  Clinical Impression: Poor R wave progression (nonspecific), sinus rhythm.  No acute ST-T wave changes or ischemia         Results for orders placed or performed during the hospital encounter of 01/16/19 (from the past 24 hour(s))   Influenza A/B antigen   Result Value Ref Range    Influenza A/B Agn Specimen Nasopharyngeal     Influenza A Negative NEG^Negative    Influenza B Negative NEG^Negative   CBC with platelets differential   Result Value Ref Range    WBC 13.9 (H) 4.0 - 11.0 10e9/L    RBC Count 4.27 3.8 - 5.2 10e12/L    Hemoglobin 13.8 " 11.7 - 15.7 g/dL    Hematocrit 41.1 35.0 - 47.0 %    MCV 96 78 - 100 fl    MCH 32.3 26.5 - 33.0 pg    MCHC 33.6 31.5 - 36.5 g/dL    RDW 12.9 10.0 - 15.0 %    Platelet Count 164 150 - 450 10e9/L    Diff Method Automated Method     % Neutrophils 80.0 %    % Lymphocytes 8.8 %    % Monocytes 10.4 %    % Eosinophils 0.0 %    % Basophils 0.3 %    % Immature Granulocytes 0.5 %    Nucleated RBCs 0 0 /100    Absolute Neutrophil 11.2 (H) 1.6 - 8.3 10e9/L    Absolute Lymphocytes 1.2 0.8 - 5.3 10e9/L    Absolute Monocytes 1.5 (H) 0.0 - 1.3 10e9/L    Absolute Eosinophils 0.0 0.0 - 0.7 10e9/L    Absolute Basophils 0.0 0.0 - 0.2 10e9/L    Abs Immature Granulocytes 0.1 0 - 0.4 10e9/L    Absolute Nucleated RBC 0.0    Comprehensive metabolic panel   Result Value Ref Range    Sodium 134 133 - 144 mmol/L    Potassium 3.6 3.4 - 5.3 mmol/L    Chloride 99 94 - 109 mmol/L    Carbon Dioxide 28 20 - 32 mmol/L    Anion Gap 7 3 - 14 mmol/L    Glucose 146 (H) 70 - 99 mg/dL    Urea Nitrogen 17 7 - 30 mg/dL    Creatinine 0.52 0.52 - 1.04 mg/dL    GFR Estimate 85 >60 mL/min/[1.73_m2]    GFR Estimate If Black >90 >60 mL/min/[1.73_m2]    Calcium 8.8 8.5 - 10.1 mg/dL    Bilirubin Total 0.9 0.2 - 1.3 mg/dL    Albumin 3.3 (L) 3.4 - 5.0 g/dL    Protein Total 6.7 (L) 6.8 - 8.8 g/dL    Alkaline Phosphatase 82 40 - 150 U/L    ALT 12 0 - 50 U/L    AST 10 0 - 45 U/L   UA with Microscopic   Result Value Ref Range    Color Urine Yellow     Appearance Urine Slightly Cloudy     Glucose Urine Negative NEG^Negative mg/dL    Bilirubin Urine Negative NEG^Negative    Ketones Urine Negative NEG^Negative mg/dL    Specific Gravity Urine 1.016 1.003 - 1.035    Blood Urine Negative NEG^Negative    pH Urine 6.0 5.0 - 7.0 pH    Protein Albumin Urine Negative NEG^Negative mg/dL    Urobilinogen mg/dL 2.0 0.0 - 2.0 mg/dL    Nitrite Urine Positive (A) NEG^Negative    Leukocyte Esterase Urine Small (A) NEG^Negative    Source Catheterized Urine     WBC Urine 49 (H) 0 - 5 /HPF     RBC Urine 4 (H) 0 - 2 /HPF    Bacteria Urine Moderate (A) NEG^Negative /HPF    Squamous Epithelial /HPF Urine <1 0 - 1 /HPF    Mucous Urine Present (A) NEG^Negative /LPF   Urine Culture   Result Value Ref Range    Specimen Description Unspecified Urine     Special Requests Specimen received in preservative     Culture Micro PENDING    CT Head w/o Contrast    Narrative    CT SCAN OF THE HEAD WITHOUT CONTRAST   1/16/2019 12:38 PM     HISTORY: See the Clinical Information for Interpreting Provider. Fall  x 2. Hitting head each time. Weakness.    TECHNIQUE: Axial images of the head and coronal reformations without  IV contrast material. Radiation dose for this scan was reduced using  automated exposure control, adjustment of the mA and/or kV according  to patient size, or iterative reconstruction technique.    COMPARISON: 1/23/2016    FINDINGS: Mild volume loss is present. Confluent white matter  hypoattenuation likely represents chronic small vessel ischemic  change. No evidence of acute ischemia, hemorrhage, mass, mass effect,  or hydrocephalus. The cerebral hemispheres, brainstem and cerebellum  are otherwise unremarkable. The visualized calvarium, skull base,  paranasal sinuses, and extracranial soft tissues are unremarkable.      Impression    IMPRESSION: No acute intracranial abnormality.    RONNELL BAKER MD   XR Chest 2 Views    Narrative    CHEST TWO VIEWS  1/16/2019 12:45 PM     HISTORY: cough, weakness. fall. right low rib pain.    COMPARISON: 12/27/2017 chest x-ray      Impression    IMPRESSION: Minimal opacity at the right costophrenic angle may be  pleural reaction, or less likely very small pleural effusion. Lungs  otherwise clear. No pulmonary vascular congestion. Heart size normal.  No evidence for pneumothorax.    MING BRIDGES MD   Lactic acid whole blood   Result Value Ref Range    Lactic Acid 1.0 0.7 - 2.0 mmol/L     I independently reviewed the X-rays: Agree with the Radiologist's  interpretation.      Medications   cefTRIAXone IN D5W (ROCEPHIN) intermittent infusion 2 g (0 g Intravenous Stopped 1/16/19 1426)   azithromycin (ZITHROMAX) 250 mg in sodium chloride 0.9 % 250 mL intermittent infusion (not administered)   pantoprazole (PROTONIX) EC tablet 40 mg (40 mg Oral Given 1/17/19 0554)   potassium chloride ER (K-TAB/KLOR-CON) CR tablet 30 mEq (30 mEq Oral Given 1/16/19 1728)   traZODone (DESYREL) tablet 100 mg (100 mg Oral Given 1/16/19 2157)   naloxone (NARCAN) injection 0.1-0.4 mg (not administered)   melatonin tablet 1 mg (not administered)   Patient is already receiving mechanical prophylaxis (not administered)   sodium chloride 0.9% infusion ( Intravenous New Bag 1/17/19 0551)   HYDROcodone-acetaminophen (NORCO) 5-325 MG per tablet 1-2 tablet (1 tablet Oral Given 1/16/19 1831)   HYDROmorphone (DILAUDID) injection 0.2 mg (not administered)   ondansetron (ZOFRAN-ODT) ODT tab 4 mg (not administered)     Or   ondansetron (ZOFRAN) injection 4 mg (not administered)   verapamil ER (CALAN-SR) CR tablet 120 mg (120 mg Oral Given 1/16/19 1728)   0.9% sodium chloride BOLUS (0 mLs Intravenous Stopped 1/16/19 1300)   azithromycin (ZITHROMAX) 500 mg in sodium chloride 0.9 % 250 mL intermittent infusion (500 mg Intravenous New Bag 1/16/19 1431)       Assessments & Plan (with Medical Decision Making)   88-year-old female who presents to the emergency department accompanied by her daughters for evaluation of cough, weakness, and frequent falls.  Patient has had 2 falls in the last 24 hours.  No evidence of LOC.  Patient did hit her head both of her falls.  The falls were unwitnessed, however, her daughter heard the patient fall both times.  No apparent LOC. She was evaluated last evening by EMS and found to be stable with no acute injury.  Patient fell again this morning in the bathroom, reports weakness and feeling like her legs are giving out.  Patient has had cough for the last week.    On arrival  patient is afebrile.  Normotensive.  No tachycardia.  No hypoxia.  No respiratory distress or tachypnea.  Lung sounds are mildly diminished in bilateral bases.  No obvious wheezing or rales are auscultated.  Patient does have an ecchymotic area on the right mid back/ribs.  Patient wearing a depends pad which is saturated with urine and skin is moderately erythematous.  No significant lower extremity edema.  WBC is elevated at 13.9.  Electrolytes are normal.  Kidney function tests are normal.  LFTs are normal.  Rapid influenza is negative.  Lactic acid is normal.  Urinalysis is positive for nitrite, small leukocyte esterase, 49 WBCs, 4 RBCs, and moderate bacteria.  This appears to be consistent with a UTI.  Head CT negative, will add coagulation therapy.  EKG sinus rhythm with no acute ischemia or ST-T wave changes. Chest x-ray reveals a minimal opacity at the right costophrenic angle, per radiologist this may be pleural reaction, or less likely very small pleural effusion.  The remainder of the lungs are clear.    Given her cough, elevated WBC, CXR findings, I have some suspicion for a possible community-acquired pneumonia.  Slightly mixed picture given her analysis findings.  I consulted with Dr. Tran, emergency physician who also evaluated patient.  Although patient is not hypoxic or in respiratory distress, she will be admitted to the hospital for urinary tract infection and possible community-acquired pneumonia causing falls and making it unsafe for her to go home due to fall risk.  The patient received IV ceftriaxone, IV Zithromax, and IV normal saline bolus.  I contacted the on-call hospitalist and spoke with Dr. Burks who agrees to assume care of patient upon admission. PCR Virus panel pending.  I discussed labs and imaging results with patient and her daughters who are in agreement with the plan for admission.      I have reviewed the nursing notes.    I have reviewed the findings, diagnosis, plan and need  for follow up with the patient.         Medication List      There are no discharge medications for this visit.         Final diagnoses:   UTI (urinary tract infection) with pyuria   Falls frequently   Weakness   Acute bronchitis, unspecified organism - With possible community-acquired pneumonia.       1/16/2019   [facility] EMERGENCY DEPARTMENT       KashRosanne, NEHA CNP  01/16/19 9470         Physician Attestation   I, Tulio Tran MD, saw and evaluated Nimo Concepcion as part of a shared visit.  I have reviewed and discussed with the advanced practice provider their history, physical and plan.    I personally reviewed the vital signs, medications, labs and imaging.    My key history or physical exam findings: Falls x2 in the past 24 hours.  Pleasant, cooperative, vital signs stable.  Demented affect otherwise appears neurologically intact.  Right mid back contusion. Otherwise unremarkable examination.    Key management decisions made by me: Head CT for elderly patient with falls and head injury.  EKG, chest x-ray and laboratory evaluation for evaluation of metabolic encephalopathy, dysrhythmia, MI or infectious cause for her falls and disequilibrium.  Initiate IV antibiotic therapy for possible community acquired pneumonia seen on chest x-ray, and for UTI.  IV fluid bolus for mild clinical dehydration.  She is unsafe for discharge home due to her falls and fall risk.  Admit for continued antibiotic therapy, continued care and disposition planning.     Tulio Tran MD  Date of Service (when I saw the patient): 1/16/19           Tulio Tran MD  01/17/19 0629

## 2019-01-16 NOTE — ED TRIAGE NOTES
"fell x2 since last night.  pt c/o \"feeling weak\"  no cp or soa.  pt states she hit back of head but denies head or neck pain  "

## 2019-01-16 NOTE — H&P
Admitted:     01/16/2019      CHIEF COMPLAINT:  Weakness and 2 falls in the last 24 hours.      HISTORY OF PRESENT ILLNESS:  Nimo Concepcion is an 88-year-old female with a history of frequent falls, previous adenocarcinoma of the breasts, bilateral mastectomies, chronic reflux esophagitis, depression, diffuse idiopathic skeletal hyperostosis, insomnia, hypercholesterolemia and hypothyroidism.  For the last week, she has had a semi-productive cough.  She has some shortness of breath.  She has had no fever or chills.  On the night before admission, she fell.  EMS was called.  She was evaluated.  She was not transported.  This morning, the patient ambulated to the bathroom with her walker.  She had trouble turning her walker around a corner.  She fell.  She denied injury, but it is noted that she has a bruise and some tenderness on her right posterior lower rib cage.  She apparently hit the back of her head.  The patient also reports some right flank pain in the last few days, but she has had no urinary tract symptoms.      The workup in the emergency room revealed a white count of 13.9.  Her urinalysis was done by catheterization and she was positive for nitrites and leukocyte esterase.  She had 49 white cells and moderate bacteria present.  Urine culture was obtained.  Chest x-ray revealed a small opacity at the right costophrenic angle.      The patient was treated for both UTI and possible pneumonia with Rocephin and Zithromax and influenza test was negative.  A respiratory virus panel is pending.      When I saw her, she looked relatively comfortable.  She was not on oxygen.  She noted some right flank pain and shortness of breath.      PAST MEDICAL HISTORY:   1.  Hypothyroidism.   2.  Breast cancer with previous bilateral mastectomy.     3.  Reflux esophagitis.   4.  Diffuse idiopathic skeletal hyperostosis.   5.  Depression.   6.  Osteoporosis.   7.  Hyperlipidemia.   8.  Insomnia.     9.  Sciatica.   10.   Vitamin D deficiency.      PAST SURGICAL HISTORY:  Includes appendectomy and bilateral mastectomy, some type of thyroid operation for goiter, total abdominal hysterectomy.      ALLERGIES:  NKA.      SOCIAL HISTORY:  The daughter has been living with her since October in her house in [place].  She is accompanied by a different daughter whose name is Derek in the emergency room.      FAMILY MEDICAL HISTORY:  Her mother had breast cancer.  Her father had alcoholism and committed suicide.        HABITS:  No tobacco, no alcohol use.      REVIEW OF SYSTEMS:  No fever or chills.  She hit her head, but she had no headache, no URI symptoms.  She has had cough, weakness, increased falls.  No chest pain.  She does have tenderness over the right posterior rib cage where she has a bruise.  She has right flank pain for about 3 days.  No urinary tract symptoms.  No other skin symptoms.  No other neurological symptoms.  No edema.  The rest of the 10-point review of systems is negative.      MEDICATIONS:  Cranberry 500 mg daily, vitamin B12 one daily, Protonix 40 b.i.d., KCl ER tablets 30 mEq daily, trazodone 100-200 mg at bedtime, verapamil 24-hour capsule 100 mg daily, Fosamax 70 mg every 7 days.      PHYSICAL EXAMINATION:   GENERAL:  She is alert, nondistressed.     VITAL SIGNS:  Heart rate 75, regular, blood pressure 154/78, respiratory rate 18, sat 94% on room air.   HEENT:  Ears:  Negative.  Oral:  Negative.  Eyes:  Pupils round, react to light.   NECK:  Supple, no mass, no meningeal signs.   LUNGS:  Some crackles at the right base.  She has a bruise at the right posterior lung field.  She is tender over that, but I do not elicit a lot of bony tenderness.   COR:  S1, S2, without click, rub or murmur.   ABDOMEN:  Flat, soft, benign, nontender, without guarding, rebound, rigidity or mass.  Bowel sounds are normal.  There is a midline scar.  Genitalia grossly normal.   EXTREMITIES:  She has a bruise on her left mid anterior  leg.  No other trauma.  No significant edema.   NEUROLOGIC:  Cranial nerves, motor, reflexes all within normal limits.      LABORATORY DATA:  EKG shows sinus rhythm, left axis with anterior fascicular block.  Voltage criteria is met for LVH.  There is poor R-wave progression.  Chest x-ray showed a minimal opacity at the right costophrenic angle.  Head CT:  No acute intracranial abnormality.        Flu swab negative.  Respiratory viral panel pending.  CBC:  White count 13,900, hemoglobin is 13.8, platelet count 164,000.  Absolute neutrophil count was elevated.  Comprehensive metabolic panel normal, except for glucose of 146, albumin of 3.3, protein of 6.7.  UA as above.  Urine culture pending.  Lactic acid 1.0.      ASSESSMENT:   1.  Two falls in the last 24 hours.   2.  Weakness and anorexia.   3.  Urinary tract infection with right flank pain, rule out pyelonephritis.   4.  Week-long history of cough with right basilar opacity - rule out community-acquired pneumonia.   5.  History of breast cancer.   6.  History of gastroesophageal reflux disease.   7.  History of goiter and hypothyroidism.      PLAN:  The patient is admitted to the hospital.  I suspect her weakness is infection related.  She may have pyelonephritis.  Pneumonia is possible.  A viral URI is also possible.  I am going to recheck a chest x-ray tomorrow because of the injury to the posterior chest and also to rule out an evolving pneumonia.  Suspect she will be in the hospital 2 nights.  She likely is going to need TCU care depending on her status as we move forward.  May give her 75 mL an hour of IV fluids since she has had poor p.o. intake.  Prophylaxis will be with sequential compression devices.         COLUMBA FIGUEREDO MD             D: 2019   T: 2019   MT: [id number]      Name:     HAVEN CAMPOVERDE   MRN:      [id number]        Account:      [id number]   :      1930        Admitted:     2019                   Document:  [id number]

## 2019-01-17 ENCOUNTER — APPOINTMENT (OUTPATIENT)
Dept: PHYSICAL THERAPY | Facility: CLINIC | Age: 84
DRG: 690 | End: 2019-01-17
Payer: COMMERCIAL

## 2019-01-17 ENCOUNTER — APPOINTMENT (OUTPATIENT)
Dept: OCCUPATIONAL THERAPY | Facility: CLINIC | Age: 84
DRG: 690 | End: 2019-01-17
Payer: COMMERCIAL

## 2019-01-17 ENCOUNTER — APPOINTMENT (OUTPATIENT)
Dept: GENERAL RADIOLOGY | Facility: CLINIC | Age: 84
DRG: 690 | End: 2019-01-17
Payer: COMMERCIAL

## 2019-01-17 LAB
ANION GAP SERPL CALCULATED.3IONS-SCNC: 7 MMOL/L (ref 3–14)
BACTERIA SPEC CULT: ABNORMAL
BUN SERPL-MCNC: 16 MG/DL (ref 7–30)
CALCIUM SERPL-MCNC: 7.7 MG/DL (ref 8.5–10.1)
CHLORIDE SERPL-SCNC: 109 MMOL/L (ref 94–109)
CO2 SERPL-SCNC: 24 MMOL/L (ref 20–32)
CREAT SERPL-MCNC: 0.5 MG/DL (ref 0.52–1.04)
ERYTHROCYTE [DISTWIDTH] IN BLOOD BY AUTOMATED COUNT: 13.2 % (ref 10–15)
FLUAV H1 2009 PAND RNA SPEC QL NAA+PROBE: NEGATIVE
FLUAV H1 RNA SPEC QL NAA+PROBE: NEGATIVE
FLUAV H3 RNA SPEC QL NAA+PROBE: NEGATIVE
FLUAV RNA SPEC QL NAA+PROBE: NEGATIVE
FLUBV RNA SPEC QL NAA+PROBE: NEGATIVE
GFR SERPL CREATININE-BSD FRML MDRD: 86 ML/MIN/{1.73_M2}
GLUCOSE SERPL-MCNC: 101 MG/DL (ref 70–99)
HADV DNA SPEC QL NAA+PROBE: NEGATIVE
HADV DNA SPEC QL NAA+PROBE: NEGATIVE
HCT VFR BLD AUTO: 36.4 % (ref 35–47)
HGB BLD-MCNC: 12.2 G/DL (ref 11.7–15.7)
HMPV RNA SPEC QL NAA+PROBE: NEGATIVE
HPIV1 RNA SPEC QL NAA+PROBE: NEGATIVE
HPIV2 RNA SPEC QL NAA+PROBE: NEGATIVE
HPIV3 RNA SPEC QL NAA+PROBE: NEGATIVE
Lab: ABNORMAL
MCH RBC QN AUTO: 32.4 PG (ref 26.5–33)
MCHC RBC AUTO-ENTMCNC: 33.5 G/DL (ref 31.5–36.5)
MCV RBC AUTO: 97 FL (ref 78–100)
MICROBIOLOGIST REVIEW: NORMAL
PLATELET # BLD AUTO: 151 10E9/L (ref 150–450)
POTASSIUM SERPL-SCNC: 3.6 MMOL/L (ref 3.4–5.3)
RBC # BLD AUTO: 3.77 10E12/L (ref 3.8–5.2)
RHINOVIRUS RNA SPEC QL NAA+PROBE: NEGATIVE
RSV RNA SPEC QL NAA+PROBE: NEGATIVE
RSV RNA SPEC QL NAA+PROBE: NEGATIVE
SODIUM SERPL-SCNC: 140 MMOL/L (ref 133–144)
SPECIMEN SOURCE: ABNORMAL
SPECIMEN SOURCE: NORMAL
WBC # BLD AUTO: 11.1 10E9/L (ref 4–11)

## 2019-01-17 PROCEDURE — 97116 GAIT TRAINING THERAPY: CPT | Mod: GP | Performed by: PHYSICAL THERAPIST

## 2019-01-17 PROCEDURE — 36415 COLL VENOUS BLD VENIPUNCTURE: CPT | Performed by: FAMILY MEDICINE

## 2019-01-17 PROCEDURE — 97165 OT EVAL LOW COMPLEX 30 MIN: CPT | Mod: GO

## 2019-01-17 PROCEDURE — 97161 PT EVAL LOW COMPLEX 20 MIN: CPT | Mod: GP | Performed by: PHYSICAL THERAPIST

## 2019-01-17 PROCEDURE — 40000133 ZZH STATISTIC OT WARD VISIT

## 2019-01-17 PROCEDURE — 85027 COMPLETE CBC AUTOMATED: CPT | Performed by: FAMILY MEDICINE

## 2019-01-17 PROCEDURE — 99232 SBSQ HOSP IP/OBS MODERATE 35: CPT | Performed by: FAMILY MEDICINE

## 2019-01-17 PROCEDURE — 25000132 ZZH RX MED GY IP 250 OP 250 PS 637: Performed by: FAMILY MEDICINE

## 2019-01-17 PROCEDURE — 71045 X-RAY EXAM CHEST 1 VIEW: CPT

## 2019-01-17 PROCEDURE — 97110 THERAPEUTIC EXERCISES: CPT | Mod: GP | Performed by: PHYSICAL THERAPIST

## 2019-01-17 PROCEDURE — 25000128 H RX IP 250 OP 636: Performed by: FAMILY MEDICINE

## 2019-01-17 PROCEDURE — 12000000 ZZH R&B MED SURG/OB

## 2019-01-17 PROCEDURE — 25000128 H RX IP 250 OP 636: Performed by: NURSE PRACTITIONER

## 2019-01-17 PROCEDURE — 80048 BASIC METABOLIC PNL TOTAL CA: CPT | Performed by: FAMILY MEDICINE

## 2019-01-17 PROCEDURE — 97535 SELF CARE MNGMENT TRAINING: CPT | Mod: GO

## 2019-01-17 PROCEDURE — 40000193 ZZH STATISTIC PT WARD VISIT: Performed by: PHYSICAL THERAPIST

## 2019-01-17 PROCEDURE — 99207 ZZC CDG-MDM COMPONENT: MEETS LOW - DOWN CODED: CPT | Performed by: FAMILY MEDICINE

## 2019-01-17 RX ADMIN — HYDROCODONE BITARTRATE AND ACETAMINOPHEN 1 TABLET: 5; 325 TABLET ORAL at 21:07

## 2019-01-17 RX ADMIN — VERAPAMIL HYDROCHLORIDE 120 MG: 120 TABLET, FILM COATED, EXTENDED RELEASE ORAL at 08:23

## 2019-01-17 RX ADMIN — CEFTRIAXONE SODIUM 2 G: 2 INJECTION, SOLUTION INTRAVENOUS at 13:47

## 2019-01-17 RX ADMIN — PANTOPRAZOLE SODIUM 40 MG: 40 TABLET, DELAYED RELEASE ORAL at 18:23

## 2019-01-17 RX ADMIN — HYDROCODONE BITARTRATE AND ACETAMINOPHEN 1 TABLET: 5; 325 TABLET ORAL at 10:55

## 2019-01-17 RX ADMIN — TRAZODONE HYDROCHLORIDE 100 MG: 100 TABLET ORAL at 21:07

## 2019-01-17 RX ADMIN — SODIUM CHLORIDE 250 MG: 9 INJECTION, SOLUTION INTRAVENOUS at 08:23

## 2019-01-17 RX ADMIN — SODIUM CHLORIDE: 9 INJECTION, SOLUTION INTRAVENOUS at 05:51

## 2019-01-17 RX ADMIN — PANTOPRAZOLE SODIUM 40 MG: 40 TABLET, DELAYED RELEASE ORAL at 05:54

## 2019-01-17 RX ADMIN — POTASSIUM CHLORIDE 30 MEQ: 750 TABLET, FILM COATED, EXTENDED RELEASE ORAL at 08:23

## 2019-01-17 ASSESSMENT — ACTIVITIES OF DAILY LIVING (ADL)
ADLS_ACUITY_SCORE: 18
ADLS_ACUITY_SCORE: 23
ADLS_ACUITY_SCORE: 18
ADLS_ACUITY_SCORE: 23
ADLS_ACUITY_SCORE: 23
ADLS_ACUITY_SCORE: 18

## 2019-01-17 ASSESSMENT — ENCOUNTER SYMPTOMS: PSYCHIATRIC NEGATIVE: 1

## 2019-01-17 NOTE — PROGRESS NOTES
01/17/19 1100   Quick Adds   Type of Visit Initial Occupational Therapy Evaluation   Living Environment   Lives With child(asuncion), adult  (daughter)   Living Arrangements house   Home Accessibility stairs to enter home;stairs within home   Living Environment Comment 2 stairs to enter and then 7 stairs to get to main living area. Once on main floor all needs met. Has tub/shower combo with extended tub bench. Daughter states she is home 24/7 and could assist as needed upon discharge.    Functional Level   Ambulation 1-->assistive equipment   Transferring 1-->assistive equipment   Toileting 1-->assistive equipment   Bathing 2-->assistive person  (daughter sets-up shower. Does not assist with actual task. )   Dressing 0-->independent   Eating 0-->independent   Communication 0-->understands/communicates without difficulty   Swallowing 0-->swallows foods/liquids without difficulty   Cognition 0 - no cognition issues reported   Fall history within last six months yes   Number of times patient has fallen within last six months 2   Which of the above functional risks had a recent onset or change? ambulation;transferring;toileting;bathing   General Information   Onset of Illness/Injury or Date of Surgery - Date 01/16/19   Referring Physician Hieu Burks MD   Patient/Family Goals Statement Daughter would like pt to return home. Pt is unsure at this time.    Additional Occupational Profile Info/Pertinent History of Current Problem Likely secondary to weakness from acute illness. UTI   Precautions/Limitations fall precautions   General Info Comments [place]. Able to hear therapist throughout therapy session.    Cognitive Status Examination   Orientation orientation to person, place and time   Level of Consciousness alert   Follows Commands (Cognition) WNL   Pain Assessment   Patient Currently in Pain Yes, see Vital Sign flowsheet  (R flank pain. bruising d/t fall. RN aware)   Strength   Strength Comments UE strength: WNL  "for ADLs. States strength as \"5/10\" (if 10/10 is pt's baseline strength).    Transfer Skill: Sit to Stand   Level of [place]: Sit/Stand contact guard   Physical Assist/Nonphysical Assist: Sit/Stand 1 person assist   Assistive Device for Transfer: Sit/Stand standard walker   Transfer Skill: Toilet Transfer   Level of [place]: Toilet contact guard   Physical Assist/Nonphysical Assist: Toilet 1 person assist   Assistive Device standard walker   Upper Body Dressing   Level of [place]: Dress Upper Body independent   Lower Body Dressing   Level of [place]: Dress Lower Body stand-by assist   Physical Assist/Nonphysical Assist: Dress Lower Body 1 person assist   Instrumental Activities of Daily Living (IADL)   IADL Comments daughter completes IADLs at baseline.    Activities of Daily Living Analysis   Impairments Contributing to Impaired Activities of Daily Living pain;strength decreased   General Therapy Interventions   Planned Therapy Interventions ADL retraining;strengthening;progressive activity/exercise   Clinical Impression   Criteria for Skilled Therapeutic Interventions Met yes, treatment indicated   OT Diagnosis decreased independence with ADLs and functional mobiltiy    Influenced by the following impairments generalized weakness   Assessment of Occupational Performance 3-5 Performance Deficits   Identified Performance Deficits toileting, showering, general activity tolerance   Clinical Decision Making (Complexity) Low complexity   Therapy Frequency daily   Predicted Duration of Therapy Intervention (days/wks) 2-3 days   Anticipated Discharge Disposition Home with Home Therapy;Transitional Care Facility   Risks and Benefits of Treatment have been explained. Yes   Patient, Family & other staff in agreement with plan of care Yes   [facility] AM-PAC  \"6 Clicks\" Daily Activity Inpatient Short Form   1. Putting on and taking off regular lower body clothing? 3 - A Little   2. Bathing " (including washing, rinsing, drying)? 3 - A Little   3. Toileting, which includes using toilet, bedpan or urinal? 3 - A Little   4. Putting on and taking off regular upper body clothing? 4 - None   5. Taking care of personal grooming such as brushing teeth? 4 - None   6. Eating meals? 4 - None   Daily Activity Raw Score (Score out of 24.Lower scores equate to lower levels of function) 21   Total Evaluation Time   Total Evaluation Time (Minutes) 6

## 2019-01-17 NOTE — PROGRESS NOTES
[facility]      Subjective:  Pain in posterior chest areas bilat  No difficulty breathing  No fever chills  Feels weak--not right  No abd pain   No ut symptoms    Review of Systems:  CONSTITUTIONAL: NEGATIVE for fever, chills, change in weight  INTEGUMENTARY/SKIN: bruising right lower rib cage  EYES: NEGATIVE for vision changes or irritation  ENT/MOUTH: NEGATIVE for ear, mouth and throat problems  RESP: NEGATIVE for significant cough or SOB  BREAST: NEGATIVE for masses, tenderness or discharge  CV: pleuritic pain posterior chest area bilat  GI: NEGATIVE for nausea, abdominal pain, heartburn, or change in bowel habits  : NEGATIVE for frequency, dysuria, or hematuria  MUSCULOSKELETAL: NEGATIVE for significant arthralgias or myalgia  NEURO: NEGATIVE for weakness, dizziness or paresthesias  ENDOCRINE: NEGATIVE for temperature intolerance, skin/hair changes  HEME: NEGATIVE for bleeding problems  PSYCHIATRIC: NEGATIVE for changes in mood or affect    Physical Exam:  Vitals Were Reviewed    Patient Vitals for the past 16 hrs:   BP Temp Temp src Pulse Heart Rate Resp SpO2   01/17/19 0733 143/65 99.4  F (37.4  C) Oral 75 -- 16 90 %   01/16/19 2357 120/50 -- -- -- -- -- --   01/16/19 2351 116/52 98.6  F (37  C) Oral -- 56 18 93 %         Intake/Output Summary (Last 24 hours) at 1/17/2019 0830  Last data filed at 1/17/2019 0744  Gross per 24 hour   Intake 1093 ml   Output 500 ml   Net 593 ml       GENERAL APPEARANCE: frail  RESP: some basilar crackles right more than left  Large bruise and tender area right post lower rib cage  CV: regular rate and rhythm, normal S1 S2, no S3 or S4 and no murmur, click or rub   ABDOMEN: soft, nontender, no HSM or masses and bowel sounds normal  MS: no clubbing, cyanosis; no edema  SKIN: clear without significant rashes or lesions  NEURO: seems mildly confused, nonfocal    Lab:  Recent Labs   Lab Test 01/17/19  0633 01/16/19  1137    134   POTASSIUM 3.6 3.6    CHLORIDE 109 99   CO2 24 28   ANIONGAP 7 7   * 146*   BUN 16 17   CR 0.50* 0.52   DIMITRIOS 7.7* 8.8     CBC RESULTS:   Recent Labs   Lab Test 01/17/19  0633 01/16/19  1137   WBC 11.1* 13.9*   RBC 3.77* 4.27   HGB 12.2 13.8   HCT 36.4 41.1    164       Results for orders placed or performed during the hospital encounter of 01/16/19 (from the past 24 hour(s))   Influenza A/B antigen   Result Value Ref Range    Influenza A/B Agn Specimen Nasopharyngeal     Influenza A Negative NEG^Negative    Influenza B Negative NEG^Negative   CBC with platelets differential   Result Value Ref Range    WBC 13.9 (H) 4.0 - 11.0 10e9/L    RBC Count 4.27 3.8 - 5.2 10e12/L    Hemoglobin 13.8 11.7 - 15.7 g/dL    Hematocrit 41.1 35.0 - 47.0 %    MCV 96 78 - 100 fl    MCH 32.3 26.5 - 33.0 pg    MCHC 33.6 31.5 - 36.5 g/dL    RDW 12.9 10.0 - 15.0 %    Platelet Count 164 150 - 450 10e9/L    Diff Method Automated Method     % Neutrophils 80.0 %    % Lymphocytes 8.8 %    % Monocytes 10.4 %    % Eosinophils 0.0 %    % Basophils 0.3 %    % Immature Granulocytes 0.5 %    Nucleated RBCs 0 0 /100    Absolute Neutrophil 11.2 (H) 1.6 - 8.3 10e9/L    Absolute Lymphocytes 1.2 0.8 - 5.3 10e9/L    Absolute Monocytes 1.5 (H) 0.0 - 1.3 10e9/L    Absolute Eosinophils 0.0 0.0 - 0.7 10e9/L    Absolute Basophils 0.0 0.0 - 0.2 10e9/L    Abs Immature Granulocytes 0.1 0 - 0.4 10e9/L    Absolute Nucleated RBC 0.0    Comprehensive metabolic panel   Result Value Ref Range    Sodium 134 133 - 144 mmol/L    Potassium 3.6 3.4 - 5.3 mmol/L    Chloride 99 94 - 109 mmol/L    Carbon Dioxide 28 20 - 32 mmol/L    Anion Gap 7 3 - 14 mmol/L    Glucose 146 (H) 70 - 99 mg/dL    Urea Nitrogen 17 7 - 30 mg/dL    Creatinine 0.52 0.52 - 1.04 mg/dL    GFR Estimate 85 >60 mL/min/[1.73_m2]    GFR Estimate If Black >90 >60 mL/min/[1.73_m2]    Calcium 8.8 8.5 - 10.1 mg/dL    Bilirubin Total 0.9 0.2 - 1.3 mg/dL    Albumin 3.3 (L) 3.4 - 5.0 g/dL    Protein Total 6.7 (L) 6.8 - 8.8  g/dL    Alkaline Phosphatase 82 40 - 150 U/L    ALT 12 0 - 50 U/L    AST 10 0 - 45 U/L   UA with Microscopic   Result Value Ref Range    Color Urine Yellow     Appearance Urine Slightly Cloudy     Glucose Urine Negative NEG^Negative mg/dL    Bilirubin Urine Negative NEG^Negative    Ketones Urine Negative NEG^Negative mg/dL    Specific Gravity Urine 1.016 1.003 - 1.035    Blood Urine Negative NEG^Negative    pH Urine 6.0 5.0 - 7.0 pH    Protein Albumin Urine Negative NEG^Negative mg/dL    Urobilinogen mg/dL 2.0 0.0 - 2.0 mg/dL    Nitrite Urine Positive (A) NEG^Negative    Leukocyte Esterase Urine Small (A) NEG^Negative    Source Catheterized Urine     WBC Urine 49 (H) 0 - 5 /HPF    RBC Urine 4 (H) 0 - 2 /HPF    Bacteria Urine Moderate (A) NEG^Negative /HPF    Squamous Epithelial /HPF Urine <1 0 - 1 /HPF    Mucous Urine Present (A) NEG^Negative /LPF   Urine Culture   Result Value Ref Range    Specimen Description Unspecified Urine     Special Requests Specimen received in preservative     Culture Micro PENDING    CT Head w/o Contrast    Narrative    CT SCAN OF THE HEAD WITHOUT CONTRAST   1/16/2019 12:38 PM     HISTORY: See the Clinical Information for Interpreting Provider. Fall  x 2. Hitting head each time. Weakness.    TECHNIQUE: Axial images of the head and coronal reformations without  IV contrast material. Radiation dose for this scan was reduced using  automated exposure control, adjustment of the mA and/or kV according  to patient size, or iterative reconstruction technique.    COMPARISON: 1/23/2016    FINDINGS: Mild volume loss is present. Confluent white matter  hypoattenuation likely represents chronic small vessel ischemic  change. No evidence of acute ischemia, hemorrhage, mass, mass effect,  or hydrocephalus. The cerebral hemispheres, brainstem and cerebellum  are otherwise unremarkable. The visualized calvarium, skull base,  paranasal sinuses, and extracranial soft tissues are unremarkable.       Impression    IMPRESSION: No acute intracranial abnormality.    RONNELL BAKER MD   XR Chest 2 Views    Narrative    CHEST TWO VIEWS  1/16/2019 12:45 PM     HISTORY: cough, weakness. fall. right low rib pain.    COMPARISON: 12/27/2017 chest x-ray      Impression    IMPRESSION: Minimal opacity at the right costophrenic angle may be  pleural reaction, or less likely very small pleural effusion. Lungs  otherwise clear. No pulmonary vascular congestion. Heart size normal.  No evidence for pneumothorax.    MING BRIDGES MD   Lactic acid whole blood   Result Value Ref Range    Lactic Acid 1.0 0.7 - 2.0 mmol/L   XR Chest Port 1 View    Narrative    CHEST ONE VIEW PORTABLE   1/17/2019 6:21 AM     HISTORY:  Cough, right base opacity.    COMPARISON: 1/16/2019      Impression    IMPRESSION: Slight worsening of right basilar opacity. Proximal small  right pleural effusion. New linear density at left lung base  consistent with atelectasis. Normal heart size and pulmonary  vascularity. Trachea deviated to the right suggesting either thyroid  goiter or tortuous great vessels on the left. This is unchanged.    RONNELL ADDISON MD   Basic metabolic panel   Result Value Ref Range    Sodium 140 133 - 144 mmol/L    Potassium 3.6 3.4 - 5.3 mmol/L    Chloride 109 94 - 109 mmol/L    Carbon Dioxide 24 20 - 32 mmol/L    Anion Gap 7 3 - 14 mmol/L    Glucose 101 (H) 70 - 99 mg/dL    Urea Nitrogen 16 7 - 30 mg/dL    Creatinine 0.50 (L) 0.52 - 1.04 mg/dL    GFR Estimate 86 >60 mL/min/[1.73_m2]    GFR Estimate If Black >90 >60 mL/min/[1.73_m2]    Calcium 7.7 (L) 8.5 - 10.1 mg/dL   CBC with platelets   Result Value Ref Range    WBC 11.1 (H) 4.0 - 11.0 10e9/L    RBC Count 3.77 (L) 3.8 - 5.2 10e12/L    Hemoglobin 12.2 11.7 - 15.7 g/dL    Hematocrit 36.4 35.0 - 47.0 %    MCV 97 78 - 100 fl    MCH 32.4 26.5 - 33.0 pg    MCHC 33.5 31.5 - 36.5 g/dL    RDW 13.2 10.0 - 15.0 %    Platelet Count 151 150 - 450 10e9/L       Assessment and Plan:    Two falls  in the last 24 hours  Likely secondary to weakness from acute illness    Weakness and anorexia.   Urinary tract infection with right flank pain, rule out pyelonephritis.     Week-long history of cough with right basilar opacity - rule out community-acquired pneumonia.  Opacity more prominent on cxr 01/17/19-could be PN or possibly effusion from chest wall injury. No current oxygen need.    Right posterior chest wall contusion/Injury related to fall  Suspect traumatic effusion     History of breast cancer.     History of gastroesophageal reflux disease.     History of goiter and hypothyroidism.     Prophylaxis-scd  FEN- lock     Plan:    I suspect her weakness is infection related.  She may have pyelonephritis.  A second cxr shows a more prominent opacity in the right chest--this could be a traumatic effusion related to trauma or PN (less likely).

## 2019-01-17 NOTE — PLAN OF CARE
Pt alert and oriented verbalizes needs appropriately. Denies pain or discomfort, appears to be resting comfortably overnight between cares. Up in room with SBA-1 and walker. Voiding without difficulty denies dysuria. Denies cough or SOB, lungs clear. PVR 4ml.

## 2019-01-17 NOTE — PLAN OF CARE
Patient up to chair for dinner with assist of 1 and walker.  Ronda given for complaints of back pain with good results.  Voiding without difficulty.  PVR = 51 ml.  Using call light appropriately.

## 2019-01-17 NOTE — CONSULTS
Care Transition Initial Assessment - RN      Met with: Patient.  DATA   Principal Problem:    UTI (urinary tract infection)  Active Problems:    CAP (community acquired pneumonia)   Primary Care Clinic Name: [facility]  Primary Care MD Name: Katarina Colon MD  Contact information and PCP information verified: Yes  ASSESSMENT  Cognitive Status: awake, alert and oriented.   Resources List: Home Care, Skilled Nursing Facility, Transitional Care   Description of Support System: Supportive, Involved   Who is your support system?: Children   Insurance Concerns: No Insurance issues identified    This writer met with pt, introduced self and role. Care Transitions is consulted for discharge planning. This writer discussed discharge planning and Medicare guidelines in regards to home care and TCU. Pt lives in a home in the community, patients daughter Lesli stays with the patient. Pt has no current services at home and she does have family support, patient in agreement with this writer discussing patients discharge options with family via telephone if needed.  Patient is in agreement and has a goal of going home and  receiving home care or going to a TCU in the event that these services are needed upon discharge.  Pt was provided with Medicare certified home care list. Pt chooses to use [facility] ([phone] Fax: [phone]). Patient was provided with Medicare certified nursing home list. Pts choices are as follows [facility] (Phone: [phone] Admissions: [phone] Fax: [phone]), [facility] (Phone: [phone] Fax: [phone]) and [facility] Phone (Main Phone:[phone] Admissions Phone:[phone] Fax: [phone]). TCU referrals pending.  Senior linkage resources provided. This writer discussed with the patient the private pay costs for transportation, patient aware. Transportation provided by patients daughter Lesli. [facility]/no CCC.    PLAN  [facility] ([phone] Fax: [phone]) PT/OT- VS- TCU (ref pend)        Kim Matos RN Care Coordinator  [facility] [phone]  [facility] [phone]

## 2019-01-17 NOTE — PROGRESS NOTES
Discharge Planner   Discharge Plans in progress: [facility] ([phone] Fax: [phone]) VS TCU (ref pend)  Barriers to discharge plan: Medical stability  Follow up plan: PCP       Entered by: Kim Matos 2019 11:51 AM         HOME CARE HAND OFF  Patient Name: Nimo Concepcion    MRN: [id number]    : 1930    Admit Diagnosis: Cough [R05]  Weakness [R53.1]  Falls frequently [R29.6]  UTI (urinary tract infection) with pyuria [N39.0]      Services Pt Needs at Home: PT and OT    Discharge Support: Family/Friend Support    Living Arrangements: With family     or Address Other Than Pt: No    Wound Care: No    Anticipate DC Date: 2019    Kim Matos RN Care Coordinator  [facility] [phone]  [facility] [phone]

## 2019-01-17 NOTE — PLAN OF CARE
Discharge Planner PT   Patient plan for discharge: Home w/ HC vsTCU- Pt deciding to discharge to home    Current status: Eval completed. Pt amb. 100 feet x1 with RW and SBA. steady gait- able to ambulate household distances    Barriers to return to prior living situation: none anticipated, will need to amb 7 steps to the main level.     Recommendations for discharge: Home w/ HC vs TCU  Depending  on progress, anticipate pt will return home    Rationale for recommendations: Pt steady w/ RW use        Entered by: Kiera Griffin 01/17/2019 3:29 PM

## 2019-01-17 NOTE — PLAN OF CARE
"Discharge Planner OT   Patient plan for discharge: Daughter wanting patient to return home with 24/7 assist and home care. Patient unsure at this time. Wants to increase strength.     Current status: Eval complete. Pt is SBA-CGA for ADLs does fatigue with activity and rates strength \"5/10\" (10/10 being baseline strength).     Barriers to return to prior living situation: generalized weakness, stairs to enter and within (once up stairs all needs met on main floor)    Recommendations for discharge: TCU vs. Home with home care. At this time patient would benefit from TCU to increase strength/activity tolerance, however pt may progress to return home with home therapy. Will continue to assess daily.     Rationale for recommendations: Up and steady on feet, however limited by decreased strength/activity tolerance.        Entered by: Grisel Jasmine 01/17/2019 1:57 PM       "

## 2019-01-17 NOTE — PROGRESS NOTES
01/17/19 1400   Quick Adds   Type of Visit Initial PT Evaluation   Living Environment   Lives With child(asuncion), adult   Living Arrangements house   Home Accessibility stairs to enter home;stairs within home   Living Environment Comment 2 steps to enter,  the 7 steps to the main level   Functional Level Prior   Ambulation 1-->assistive equipment   Transferring 1-->assistive equipment   Toileting 1-->assistive equipment   Bathing 2-->assistive person  (daughter sets-up shower. Does not assist with actual task. )   Communication 0-->understands/communicates without difficulty   Swallowing 0-->swallows foods/liquids without difficulty   Cognition 0 - no cognition issues reported   Fall history within last six months yes   Number of times patient has fallen within last six months 2   Which of the above functional risks had a recent onset or change? ambulation;transferring;toileting;bathing   Prior Functional Level Comment PLOF- Pt indep. with ambulation using a RW houshold distances-   2 recent falls w/ ambulation   General Information   Onset of Illness/Injury or Date of Surgery - Date 01/16/19   Referring Physician Vitaliy   Patient/Family Goals Statement Pt reports home vs TCU- deciding on home per daughter's request   Pertinent History of Current Problem (include personal factors and/or comorbidities that impact the POC) 88 year old female with history of frequent falls, adenocarcinoma of breast, chronic reflux esophagitis, depression, diffuse idiopathic skeletal hyperostosis, insomnia, hypercholesterolemia, and hypothyroidism who presents to the emergency department accompanied by her daughters for evaluation of cough, weakness, and falling   Precautions/Limitations fall precautions   General Observations Pt alert- pleasant.  - Reports intermittent posterior R rib cage pain   Pain Assessment   Patient Currently in Pain Yes, see Vital Sign flowsheet  (intermittent )   Range of Motion (ROM)   ROM Comment WFL    MMT:  "Hip, Rehab Eval   Hip Flexion - Left Side (4-/5) good minus, left   Hip Internal Rotation - Left Side (4-/5) good minus, left   MMT: Knee, Rehab Eval   Knee Flexion - Left Side (4/5) good, left   Knee Extension - Left Side (4/5) good, left   Knee Flexion - Right Side (4/5) good, right   Knee Extension - Right Side (4/5) good, right   MMT: Ankle, Rehab Eval   Ankle Dorsiflexion - Left Side (4/5) good, left   Ankle Dorsiflexion - Right Side (4/5) good, left   Bed Mobility   Bed Mobility Comments SBA supine<. sitting   Transfer Skills   Transfer Comments SBA sit<. stand w/ RW    Gait   Gait Comments Pt amb. 100 feet x1 with RW and SBA. steady gait- able to ambulate household distances   Balance   Balance Comments good dynamic standing balance w/ RW use   General Therapy Interventions   Planned Therapy Interventions bed mobility training;gait training;strengthening   Clinical Impression   Criteria for Skilled Therapeutic Intervention yes, treatment indicated   PT Diagnosis Generalized weakness   Influenced by the following impairments Decreased strength   Functional limitations due to impairments decreased ambulatory status   Clinical Presentation Stable/Uncomplicated   Clinical Presentation Rationale clinical judgement   Clinical Decision Making (Complexity) Low complexity   Therapy Frequency` daily   Predicted Duration of Therapy Intervention (days/wks) clinical judgement   Anticipated Discharge Disposition Home with Home Therapy;Transitional Care Facility   Risk & Benefits of therapy have been explained Yes   Patient, Family & other staff in agreement with plan of care Yes   [facility] AM-PAC  \"6 Clicks\" V.2 Basic Mobility Inpatient Short Form   1. Turning from your back to your side while in a flat bed without using bedrails? 4 - None   2. Moving from lying on your back to sitting on the side of a flat bed without using bedrails? 4 - None   3. Moving to and from a bed to a chair (including a wheelchair)? 3 - " A Little   4. Standing up from a chair using your arms (e.g., wheelchair, or bedside chair)? 3 - A Little   5. To walk in hospital room? 3 - A Little   6. Climbing 3-5 steps with a railing? 3 - A Little   Basic Mobility Raw Score (Score out of 24.Lower scores equate to lower levels of function) 20

## 2019-01-18 ENCOUNTER — RECORDS - HEALTHEAST (OUTPATIENT)
Dept: ADMINISTRATIVE | Facility: OTHER | Age: 84
End: 2019-01-18

## 2019-01-18 ENCOUNTER — APPOINTMENT (OUTPATIENT)
Dept: OCCUPATIONAL THERAPY | Facility: CLINIC | Age: 84
DRG: 690 | End: 2019-01-18
Payer: COMMERCIAL

## 2019-01-18 VITALS
DIASTOLIC BLOOD PRESSURE: 74 MMHG | RESPIRATION RATE: 16 BRPM | SYSTOLIC BLOOD PRESSURE: 128 MMHG | WEIGHT: 147.27 LBS | OXYGEN SATURATION: 94 % | BODY MASS INDEX: 25.14 KG/M2 | HEIGHT: 64 IN | HEART RATE: 104 BPM | TEMPERATURE: 99.2 F

## 2019-01-18 LAB
ANION GAP SERPL CALCULATED.3IONS-SCNC: 7 MMOL/L (ref 3–14)
BUN SERPL-MCNC: 15 MG/DL (ref 7–30)
CALCIUM SERPL-MCNC: 7.6 MG/DL (ref 8.5–10.1)
CHLORIDE SERPL-SCNC: 112 MMOL/L (ref 94–109)
CO2 SERPL-SCNC: 25 MMOL/L (ref 20–32)
CREAT SERPL-MCNC: 0.54 MG/DL (ref 0.52–1.04)
ERYTHROCYTE [DISTWIDTH] IN BLOOD BY AUTOMATED COUNT: 13.2 % (ref 10–15)
GFR SERPL CREATININE-BSD FRML MDRD: 84 ML/MIN/{1.73_M2}
GLUCOSE SERPL-MCNC: 115 MG/DL (ref 70–99)
HCT VFR BLD AUTO: 36.5 % (ref 35–47)
HGB BLD-MCNC: 12.1 G/DL (ref 11.7–15.7)
MCH RBC QN AUTO: 32.5 PG (ref 26.5–33)
MCHC RBC AUTO-ENTMCNC: 33.2 G/DL (ref 31.5–36.5)
MCV RBC AUTO: 98 FL (ref 78–100)
PLATELET # BLD AUTO: 166 10E9/L (ref 150–450)
POTASSIUM SERPL-SCNC: 4 MMOL/L (ref 3.4–5.3)
RBC # BLD AUTO: 3.72 10E12/L (ref 3.8–5.2)
SODIUM SERPL-SCNC: 144 MMOL/L (ref 133–144)
WBC # BLD AUTO: 10.4 10E9/L (ref 4–11)

## 2019-01-18 PROCEDURE — 25000128 H RX IP 250 OP 636: Performed by: NURSE PRACTITIONER

## 2019-01-18 PROCEDURE — 85027 COMPLETE CBC AUTOMATED: CPT | Performed by: FAMILY MEDICINE

## 2019-01-18 PROCEDURE — 80048 BASIC METABOLIC PNL TOTAL CA: CPT | Performed by: FAMILY MEDICINE

## 2019-01-18 PROCEDURE — 40000133 ZZH STATISTIC OT WARD VISIT

## 2019-01-18 PROCEDURE — 97535 SELF CARE MNGMENT TRAINING: CPT | Mod: GO

## 2019-01-18 PROCEDURE — 99238 HOSP IP/OBS DSCHRG MGMT 30/<: CPT | Performed by: FAMILY MEDICINE

## 2019-01-18 PROCEDURE — 25000132 ZZH RX MED GY IP 250 OP 250 PS 637: Performed by: FAMILY MEDICINE

## 2019-01-18 PROCEDURE — 36415 COLL VENOUS BLD VENIPUNCTURE: CPT | Performed by: FAMILY MEDICINE

## 2019-01-18 RX ORDER — SULFAMETHOXAZOLE/TRIMETHOPRIM 800-160 MG
1 TABLET ORAL 2 TIMES DAILY
Qty: 14 TABLET | Refills: 0 | COMMUNITY
Start: 2019-01-18 | End: 2019-01-25

## 2019-01-18 RX ORDER — AMOXICILLIN 250 MG
2 CAPSULE ORAL 2 TIMES DAILY
Status: DISCONTINUED | OUTPATIENT
Start: 2019-01-18 | End: 2019-01-18 | Stop reason: HOSPADM

## 2019-01-18 RX ORDER — SULFAMETHOXAZOLE/TRIMETHOPRIM 800-160 MG
1 TABLET ORAL 2 TIMES DAILY
Qty: 14 TABLET | Refills: 0 | Status: SHIPPED | OUTPATIENT
Start: 2019-01-18 | End: 2019-01-18

## 2019-01-18 RX ADMIN — PANTOPRAZOLE SODIUM 40 MG: 40 TABLET, DELAYED RELEASE ORAL at 06:27

## 2019-01-18 RX ADMIN — VERAPAMIL HYDROCHLORIDE 120 MG: 120 TABLET, FILM COATED, EXTENDED RELEASE ORAL at 08:06

## 2019-01-18 RX ADMIN — STANDARDIZED SENNA CONCENTRATE AND DOCUSATE SODIUM 2 TABLET: 8.6; 5 TABLET, FILM COATED ORAL at 10:24

## 2019-01-18 RX ADMIN — SODIUM CHLORIDE 250 MG: 9 INJECTION, SOLUTION INTRAVENOUS at 08:09

## 2019-01-18 RX ADMIN — POTASSIUM CHLORIDE 30 MEQ: 750 TABLET, FILM COATED, EXTENDED RELEASE ORAL at 08:06

## 2019-01-18 RX ADMIN — CEFTRIAXONE SODIUM 2 G: 2 INJECTION, SOLUTION INTRAVENOUS at 13:57

## 2019-01-18 ASSESSMENT — ACTIVITIES OF DAILY LIVING (ADL)
ADLS_ACUITY_SCORE: 18

## 2019-01-18 ASSESSMENT — MIFFLIN-ST. JEOR: SCORE: 1083

## 2019-01-18 NOTE — PLAN OF CARE
Pt alert/oriented. Took 1 norco at beginning of shift for pain in right & left posterior chest for pain with coughing. States this relieved her pain. Up with SBA & walker, +vdg. Barrier cream applied to blanchable redness on coccyx.

## 2019-01-18 NOTE — PROGRESS NOTES
[facility] Hospitalist Service      Subjective:  No pain  No fever  No chills  No uti symptoms  Wants to stay -its so comfortable here    Review of Systems:  CONSTITUTIONAL: NEGATIVE for fever, chills, change in weight  INTEGUMENTARY/SKIN: NEGATIVE for worrisome rashes, moles or lesions  EYES: NEGATIVE for vision changes or irritation  ENT/MOUTH: NEGATIVE for ear, mouth and throat problems  RESP: NEGATIVE for significant cough or SOB  BREAST: NEGATIVE for masses, tenderness or discharge  CV: NEGATIVE for chest pain, palpitations or peripheral edema  GI: NEGATIVE for nausea, abdominal pain, heartburn, or change in bowel habits  : NEGATIVE for frequency, dysuria, or hematuria  MUSCULOSKELETAL: NEGATIVE for significant arthralgias or myalgia  NEURO: NEGATIVE for weakness, dizziness or paresthesias  ENDOCRINE: NEGATIVE for temperature intolerance, skin/hair changes  HEME: NEGATIVE for bleeding problems  PSYCHIATRIC: NEGATIVE for changes in mood or affect    Physical Exam:  Vitals Were Reviewed    Patient Vitals for the past 16 hrs:   BP Temp Temp src Pulse Resp SpO2 Weight   01/18/19 0500 -- -- -- -- -- -- 66.8 kg (147 lb 4.3 oz)   01/17/19 2358 140/59 98.9  F (37.2  C) Oral 77 16 93 % --   01/17/19 1822 142/49 98.4  F (36.9  C) Oral 74 16 94 % --         Intake/Output Summary (Last 24 hours) at 1/18/2019 0730  Last data filed at 1/18/2019 0635  Gross per 24 hour   Intake 360 ml   Output 900 ml   Net -540 ml       GENERAL APPEARANCE: healthy, alert and no distress  RESP: decreased right base, contussion and echymosis over laying this  CV: regular rate and rhythm, normal S1 S2, no S3 or S4 and no murmur, click or rub   ABDOMEN: soft, nontender, no HSM or masses and bowel sounds normal  MS: no clubbing, cyanosis; no edema  SKIN: clear without significant rashes or lesions    Lab:  Recent Labs   Lab Test 01/18/19  0522 01/17/19  0633    140   POTASSIUM 4.0 3.6   CHLORIDE 112* 109   CO2 25 24   ANIONGAP 7 7    * 101*   BUN 15 16   CR 0.54 0.50*   DIMITRIOS 7.6* 7.7*     CBC RESULTS:   Recent Labs   Lab Test 01/18/19  0522 01/17/19  0633   WBC 10.4 11.1*   RBC 3.72* 3.77*   HGB 12.1 12.2   HCT 36.5 36.4    151       Results for orders placed or performed during the hospital encounter of 01/16/19 (from the past 24 hour(s))   Care Transition RN/SW IP Consult    Narrative    Kim Matos RN     1/17/2019 12:02 PM  Care Transition Initial Assessment - RN      Met with: Patient.  DATA   Principal Problem:    UTI (urinary tract infection)  Active Problems:    CAP (community acquired pneumonia)   Primary Care Clinic Name: [facility]  Primary Care MD Name: Katarina Colon MD  Contact information and PCP information verified: Yes  ASSESSMENTCognitive Status: awake, alert and oriented.   Resources List: Home Care, Skilled Nursing Facility,   Transitional Care   Description of Support System: Supportive, Involved   Who is your support system?: Children   Insurance Concerns: No Insurance issues identified    This writer met with pt, introduced self and role. Care   Transitions is consulted for discharge planning. This writer   discussed discharge planning and Medicare guidelines in regards   to home care and TCU. Pt lives in a home in the community,   patients daughter Lesli stays with the patient. Pt has no   current services at home and she does have family support,   patient in agreement with this writer discussing patients   discharge options with family via telephone if needed.  Patient   is in agreement and has a goal of going home and  receiving home   care or going to a TCU in the event that these services are   needed upon discharge.  Pt was provided with Medicare certified   home care list. Pt chooses to use [facility]   ([phone] Fax: [phone]). Patient was provided with   Medicare certified nursing home list. Pts choices are as follows   [facility] (Phone: [phone]  Admissions:   [phone] Fax: [phone]), [place] [facility]   (Phone: [phone] Fax: [phone]) and [facility] (Main Phone:[phone] Admissions   Phone:[phone] Fax: [phone]). TCU referrals pending.    Senior linkage resources provided. This writer discussed with the   patient the private pay costs for transportation, patient aware.   Transportation provided by patients daughter Lesli. Patient has   a [facility] primary provider/no CCC.    PLAN  [facility] ([phone] Fax: [phone]) PT/OT-   VS- TCU (ref pend)        Kim Matos RN Care Coordinator  [facility] [phone]  [facility] [phone]   Basic metabolic panel   Result Value Ref Range    Sodium 144 133 - 144 mmol/L    Potassium 4.0 3.4 - 5.3 mmol/L    Chloride 112 (H) 94 - 109 mmol/L    Carbon Dioxide 25 20 - 32 mmol/L    Anion Gap 7 3 - 14 mmol/L    Glucose 115 (H) 70 - 99 mg/dL    Urea Nitrogen 15 7 - 30 mg/dL    Creatinine 0.54 0.52 - 1.04 mg/dL    GFR Estimate 84 >60 mL/min/[1.73_m2]    GFR Estimate If Black >90 >60 mL/min/[1.73_m2]    Calcium 7.6 (L) 8.5 - 10.1 mg/dL   CBC with platelets   Result Value Ref Range    WBC 10.4 4.0 - 11.0 10e9/L    RBC Count 3.72 (L) 3.8 - 5.2 10e12/L    Hemoglobin 12.1 11.7 - 15.7 g/dL    Hematocrit 36.5 35.0 - 47.0 %    MCV 98 78 - 100 fl    MCH 32.5 26.5 - 33.0 pg    MCHC 33.2 31.5 - 36.5 g/dL    RDW 13.2 10.0 - 15.0 %    Platelet Count 166 150 - 450 10e9/L       Assessment and Plan:    Two falls in the last 24 hours  Likely secondary to weakness from acute illness     Weakness and anorexia.   Pan sensitive e coli urinary tract infection with right flank pain, rule out pyelonephritis.       Week-long history of cough with right basilar opacity - rule out community-acquired pneumonia.  Opacity more prominent on cxr 01/17/19-could be PN or possibly effusion from chest wall injury.      Right posterior chest wall  contusion/Injury related to fall  Opacity could represent a traumatic pleural effusion     History of breast cancer.      History of gastroesophageal reflux disease.      History of goiter and hypothyroidism.      Prophylaxis-scd  FEN- lock     Plan:    I suspect her weakness is infection related.  She may have pyelonephritis. A second cxr shows a more prominent opacity in the right chest--this could be a traumatic effusion related to trauma or PN (less likely). discussed with daughter-they want discharge to home.      10:37 AM family wants tcu-will notify social service.

## 2019-01-18 NOTE — PLAN OF CARE
Discharge Planner OT   Patient plan for discharge: Home with assist from daughter and home therapy    Current status: Independent with all aspects of toileting and ambulates to/from sink and stands x3 min for AM grooming/hygiene. Needs encouragement to sit up in chair and participate in activity. Discussed the importance of continuing activity within home and provided recommendation of home therapy    Barriers to return to prior living situation: generalized weakness.     Recommendations for discharge: Home with home therapy to increase strength/activity tolerance for ADLs.     Rationale for recommendations: Pt appropriate to have therapy within home setting. Completes ADLs with SBA-independent. Home therapy to address strength and general activity tolerance.        Entered by: Grisel Jasmine 01/18/2019 9:22 AM     Occupational Therapy Discharge Summary    Reason for therapy discharge:    Discharged to transitional care facility.    Progress towards therapy goal(s). See goals on Care Plan in [facility] electronic health record for goal details.  Goals partially met.  Barriers to achieving goals:   discharge from facility.    Therapy recommendation(s):    Continued therapy is recommended.  Rationale/Recommendations:  to increase strength/activity tolerance for ADLs/IADLs.

## 2019-01-18 NOTE — PLAN OF CARE
Patient is alert, calm and cooperative. Patient stated she wanted to go home this morning at time of discharge. Daughter Lesli here now and patient and daughter report wanting to go to TCU. Dr Burks and  Sim updated. Patient is up in room with 1 assist and walker. Sat in chair this morning. Ate well. Turns independently in bed. Voiding without difficulty. Post void residual 25 ml. Patient reports constipation, medicated with 2 senna and prune juice this morning. Alarms on for safety.

## 2019-01-18 NOTE — PLAN OF CARE
Physical Therapy Discharge Summary    Reason for therapy discharge:    Discharged to transitional care facility.    Progress towards therapy goal(s). See goals on Care Plan in [facility] electronic health record for goal details.  Goals partially met.  Barriers to achieving goals:   discharge from facility.    Therapy recommendation(s):    Continued therapy is recommended.  Rationale/Recommendations:  continued PT at TCU for ongoing strengthening; to ensure independence safety w/ mobility  for return home.

## 2019-01-18 NOTE — PROGRESS NOTES
[facility] DISCHARGE NOTE    Patient discharged to transitional care unit at 2:50 PM via wheel chair. Accompanied by son and staff. Discharge instructions reviewed with caregiver, opportunity offered to ask questions. Prescriptions sent with patient to fill . All belongings sent with patient.  Report called to Deann at [facility] in SBAR format. Clarified bactrim orders with Dr Burks, patient to take 1 tab twice daily for 7 days-Deann notified. And B-12 dose is 500 mcg daily.   Vanessa Ramirez

## 2019-01-18 NOTE — PROGRESS NOTES
Name: Nimo Concepcion    MRN#: [id number]    Reason for Hospitalization: Cough [R05]  Weakness [R53.1]  Falls frequently [R29.6]  UTI (urinary tract infection) with pyuria [N39.0]    Discharge Date: 1/18/2019    Patient / Family response to discharge plan: Pt will discharge today at 1430 to [facility] (Phone: [phone] Admissions: [phone] Fax: [phone]) via family transport.     Other Providers (Care Coordinator, County Services, PCA services etc): No    CTS Hand Off Completed: Yes: completed    PAS #: requested    SANDOR Score: average    Future Appointments: No future appointments.    Discharge Disposition: transitional care unit    Discharge Planner   Discharge Plans in progress: NB TCU  Barriers to discharge plan: none  Follow up plan: TCU       Entered by: Alyce Hammond 01/18/2019 12:37 PM           Alyce HOLLOWAY, LICSW, [phone]

## 2019-01-18 NOTE — PLAN OF CARE
Alert and oriented. VSS. Up with standby assist w/walker.  PVR 17 this shift.  PRN Oxycodone given for c/o pain with stated adequate relief.  Patient reports feeling stronger.

## 2019-01-20 NOTE — DISCHARGE SUMMARY
Admit Date:     01/16/2019   Discharge Date:     01/18/2019      HISTORY OF PRESENT ILLNESS:  Nimo Concepcion is an 88-year-old female with a history of frequent falls, previous adenocarcinoma of the breast, bilateral mastectomies, chronic reflux esophagitis, depression, diffuse idiopathic skeletal hyperostosis, insomnia, hyperlipidemia, and hypothyroidism.  She presented from home after having 2 falls the night before admission, she fell.  EMS was called.  She was evaluated, but not transported.  On the morning of admission, the patient ambulated to the bathroom with her walker.  She has trouble turning a corner with her walker.  She tripped up and fell.  She felt weak.  She denied injury, but it was noted that she had a bruise and tenderness in her right posterior lower rib cage.  She apparently hit the back of her head when she fell.      In retrospect, the patient did report some right flank pain in the days prior to admission, but she had no urinary tract symptoms.  She had no fever or chills.      In the emergency room, she had a white count of 13.9.  Her urinalysis was abnormal and ultimately the culture grew out pansensitive E. coli.  It was thought that she possibly had acute pyelonephritis that created weakness that precipitated the falls.      Also on admission, she had a chest x-ray, which showed a small opacity at the right costophrenic angle.  A repeat chest x-ray showed this to be slightly enlarged; overlying this area was an ecchymotic area in the chest wall.  Ultimately, I suspect that this was a small traumatic pleural effusion and was not pneumonia.  She did, however, have some cough prior to admission and may have had a viral URI; however, her viral respiratory panel was negative.      There was some discussion about sending her to a TCU, but ultimately her family wanted to bring her home and she was discharged to home.      ASSESSMENT:   1.  Two falls in the 24 hours prior to admission.   2.   Weakness and anorexia related to acute urinary tract infection and possible pyelonephritis.   3.  Traumatic injury to the right lower posterior chest wall with probable small traumatic pleural effusion related to this.   4.  History of breast cancer.   5.  History of gastroesophageal reflux disease.   6.  History of goiter and hypothyroidism.      PLAN:  The patient is going to discharge on her regular meds plus Bactrim-DS 1 p.o. b.i.d. x 7 days.  She should have a primary care followup within 1 week.      Discharge Medication List as of 2019  2:14 PM      CONTINUE these medications which have CHANGED    Details   sulfamethoxazole-trimethoprim (BACTRIM DS/SEPTRA DS) 800-160 MG tablet Take 1 tablet by mouth 2 times daily, Disp-14 tablet, R-0, Historical         CONTINUE these medications which have NOT CHANGED    Details   alendronate (FOSAMAX) 70 MG tablet Take 70 mg by mouth every 7 days, Historical      Cranberry 500 MG TABS Take 1 tablet by mouth daily, Historical      Cyanocobalamin (VITAMIN B-12 PO) Take 1 capsule by mouth daily, Historical      Pantoprazole Sodium (PROTONIX PO) Take 40 mg by mouth 2 times daily (before meals) , Historical      POTASSIUM CHLORIDE ER PO Take 30 mEq by mouth daily , Historical      TRAZODONE HCL PO Take 100-200 mg by mouth At Bedtime , Historical      VERAPAMIL HCL ER PO 24HR CAPSULE Take 100 mg by mouth daily, Historical           Unresulted Labs Ordered in the Past 30 Days of this Admission     No orders found from 2018 to 2019.              COLUMBA FIGUEREDO MD             D: 2019   T: 2019   MT: HUA      Name:     HAVEN CAMPOVERDE   MRN:      [id number]        Account:        [id number]   :      1930           Admit Date:     2019                                  Discharge Date: 2019      Document: [id number]

## 2019-01-21 ENCOUNTER — NURSING HOME VISIT (OUTPATIENT)
Dept: GERIATRICS | Facility: CLINIC | Age: 84
End: 2019-01-21
Payer: COMMERCIAL

## 2019-01-21 VITALS
BODY MASS INDEX: 25.75 KG/M2 | DIASTOLIC BLOOD PRESSURE: 86 MMHG | WEIGHT: 150 LBS | HEART RATE: 76 BPM | SYSTOLIC BLOOD PRESSURE: 173 MMHG | RESPIRATION RATE: 16 BRPM | TEMPERATURE: 98.7 F | OXYGEN SATURATION: 96 %

## 2019-01-21 DIAGNOSIS — I10 ESSENTIAL HYPERTENSION: ICD-10-CM

## 2019-01-21 DIAGNOSIS — M81.0 OSTEOPOROSIS WITHOUT CURRENT PATHOLOGICAL FRACTURE, UNSPECIFIED OSTEOPOROSIS TYPE: ICD-10-CM

## 2019-01-21 DIAGNOSIS — K21.9 GASTROESOPHAGEAL REFLUX DISEASE, ESOPHAGITIS PRESENCE NOT SPECIFIED: ICD-10-CM

## 2019-01-21 DIAGNOSIS — J90 PLEURAL EFFUSION: ICD-10-CM

## 2019-01-21 DIAGNOSIS — Z91.81 PERSONAL HISTORY OF FALL: ICD-10-CM

## 2019-01-21 DIAGNOSIS — F51.01 PRIMARY INSOMNIA: ICD-10-CM

## 2019-01-21 DIAGNOSIS — N10 ACUTE PYELONEPHRITIS: Primary | ICD-10-CM

## 2019-01-21 PROCEDURE — 99310 SBSQ NF CARE HIGH MDM 45: CPT | Performed by: NURSE PRACTITIONER

## 2019-01-21 NOTE — PROGRESS NOTES
[facility]  PRIMARY CARE PROVIDER AND CLINIC:  Katarina Colon [facility] [address] [address] [address]  Chief Complaint   Patient presents with     Hospital F/U     [facility] Record Number:  [id number]  Place of Service where encounter took place:  [facility] ([facility]) [[id number]]    HPI:    Nimo Concepcion is a 88 year old  (4/11/1930),admitted to the above facility from  [facility].  Hospital stay 1/16/19 through 1/18/19.  Admitted to this facility for  rehab, medical management and nursing care.  HPI information obtained from: facility chart records, facility staff, patient report and [facility] chart review.  Current issues are:           From hospital note:  Nimo Concepcion is an 88-year-old female with a history of frequent falls, previous adenocarcinoma of the breast, bilateral mastectomies, chronic reflux esophagitis, depression, diffuse idiopathic skeletal hyperostosis, insomnia, hyperlipidemia, and hypothyroidism.  She presented from home after having 2 falls the night before admission, she fell.  EMS was called.  She was evaluated, but not transported.  On the morning of admission, the patient ambulated to the bathroom with her walker.  She has trouble turning a corner with her walker.  She tripped up and fell.  She felt weak.  She denied injury, but it was noted that she had a bruise and tenderness in her right posterior lower rib cage.  She apparently hit the back of her head when she fell.      In retrospect, the patient did report some right flank pain in the days prior to admission, but she had no urinary tract symptoms.  She had no fever or chills.      In the emergency room, she had a white count of 13.9.  Her urinalysis was abnormal and ultimately the culture grew out pansensitive E. coli.  It was thought that she possibly had acute pyelonephritis that created weakness that precipitated the falls.      Also on admission, she had a  chest x-ray, which showed a small opacity at the right costophrenic angle.  A repeat chest x-ray showed this to be slightly enlarged; overlying this area was an ecchymotic area in the chest wall.  Ultimately, I suspect that this was a small traumatic pleural effusion and was not pneumonia.  She did, however, have some cough prior to admission and may have had a viral URI; however, her viral respiratory panel was negative.     In TCU, patient has no complaints, other than weakness and desire to work towards getting home.  Patient denies any flank pain, abdominal pain, chest pain, shortness of breath, lightheadedness, dizziness, falls.  Is standby assist.  Appetite fair sleeping fair.  Has history of chronic insomnia.    CODE STATUS/ADVANCE DIRECTIVES DISCUSSION:   CPR/Full code   Patient's living condition: lives with family, adult children     ALLERGIES:Patient has no known allergies.  PAST MEDICAL HISTORY:  has no past medical history on file.  PAST SURGICAL HISTORY:  has no past surgical history on file.  FAMILY HISTORY: family history is not on file.  SOCIAL HISTORY:  reports that  has never smoked. she has never used smokeless tobacco. She reports that she does not drink alcohol or use drugs.    Post Discharge Medication Reconciliation Status: discharge medications reconciled, continue medications without change.  Current Outpatient Medications   Medication Sig Dispense Refill     alendronate (FOSAMAX) 70 MG tablet Take 70 mg by mouth every 7 days       Cranberry 500 MG TABS Take 1 tablet by mouth daily       Cyanocobalamin (VITAMIN B-12 PO) Take 1 capsule by mouth daily       OMEPRAZOLE PO Take 20 mg by mouth 2 times daily       POTASSIUM CHLORIDE ER PO Take 30 mEq by mouth daily        sulfamethoxazole-trimethoprim (BACTRIM DS/SEPTRA DS) 800-160 MG tablet Take 1 tablet by mouth 2 times daily 14 tablet 0     TRAZODONE HCL PO Take 100 mg by mouth At Bedtime        VERAPAMIL HCL ER PO 24HR CAPSULE Take 100 mg by  mouth daily         ROS:  10 point ROS of systems including Constitutional, Eyes, Respiratory, Cardiovascular, Gastroenterology, Genitourinary, Integumentary, Musculoskeletal, Psychiatric were all negative except for pertinent positives noted in my HPI.    Exam:  /86   Pulse 76   Temp 98.7  F (37.1  C)   Resp 16   Wt 68 kg (150 lb)   SpO2 96%   BMI 25.75 kg/m    GENERAL APPEARANCE:  Alert, in no distress, Sitting in wheelchair  ENT:  Mouth and posterior oropharynx normal, moist mucous membranes  RESP:  lungs clear to auscultation , no respiratory distress, No crackles, wheezing, or tenderness  CV:  Palpation and auscultation of heart done , regular rate and rhythm, no murmur, rub, or gallop, no edema  ABDOMEN:  normal bowel sounds, soft, nontender, no hepatosplenomegaly or other masses  M/S:   Gait and station abnormal Generalized weakness.  Wheelchair or standby assist with walker  SKIN:  No concerning lesions noted  NEURO:   NFD  PSYCH:  normal insight, judgement and memory, Affect flat.  Answers in one-word responses until more is elicited    Lab/Diagnostic data:  CBC RESULTS:   Recent Labs   Lab Test 01/18/19  0522 01/17/19  0633   WBC 10.4 11.1*   RBC 3.72* 3.77*   HGB 12.1 12.2   HCT 36.5 36.4   MCV 98 97   MCH 32.5 32.4   MCHC 33.2 33.5   RDW 13.2 13.2    151       Last Basic Metabolic Panel:  Recent Labs   Lab Test 01/18/19  0522 01/17/19  0633    140   POTASSIUM 4.0 3.6   CHLORIDE 112* 109   DIMITRIOS 7.6* 7.7*   CO2 25 24   BUN 15 16   CR 0.54 0.50*   * 101*       Liver Function Studies -   Recent Labs   Lab Test 01/16/19  1137 07/14/18  1527   PROTTOTAL 6.7* 6.2*   ALBUMIN 3.3* 3.2*   BILITOTAL 0.9 0.7   ALKPHOS 82 56   AST 10 10   ALT 12 14       TSH   Date Value Ref Range Status   11/26/2018 1.00 0.40 - 4.00 mU/L Final     ASSESSMENT/PLAN:  Acute pyelonephritis  On Bactrim DS times 7 days and cranberry.  Complete course.  3 days ago white blood count down to 10.4, hemoglobin  12.1.  BMP generally within normal limits other than a slightly low calcium.  Anticipate good recovery with antibiotic    Pleural effusion  Lung sounds clear, no further crackles, no shortness of breath.  Suspect dissipating    Personal history of fall  Suspect related to weakness related to urinary tract infection.  No further falls in TCU.  Participating well with therapies.  Continue plan    Essential hypertension    On verapamil 100 and KCl 30  Blood pressure has been high but there are only 2 recordings available.  Will have nursing staff check it more frequently.  Blood pressures:  01/19/2019 09:22 173/86 mmHg  01/18/2019 21:48 170/83 mmHg    Gastroesophageal reflux disease, esophagitis presence not specified  Prior to admission was on Protonix.  Pharmacy changed to omeprazole 20 mg twice daily.  Stable    Primary insomnia  Chronic condition.  Managed with trazodone 100 mg nightly.  Continue PTA meds    Osteoporosis without current pathological fracture, unspecified osteoporosis type  Chronic condition.  Managed with Fosamax.  Continue PTA medications       Orders:  1. Check BP every shift x3 day. Report SBP>170    Total time spent with patient visit at the skilled nursing facility was 35 min including patient visit and review of past records. Greater than 50% of total time spent with counseling and coordinating care due to admission    Electronically signed by:  NEHA Clemons CNP

## 2019-01-21 NOTE — LETTER
1/21/2019        RE: Nimo Concepcion  [address]  [facility] [address]        [place] [facility]  PRIMARY CARE PROVIDER AND CLINIC:  Katarina Colon [facility] [address] [address] [address]  Chief Complaint   Patient presents with     Hospital F/U     [facility] Record Number:  [id number]  Place of Service where encounter took place:  [facility] ([facility] [[id number]]    HPI:    Nimo Concepcion is a 88 year old  (4/11/1930),admitted to the above facility from  [facility].  Hospital stay 1/16/19 through 1/18/19.  Admitted to this facility for  rehab, medical management and nursing care.  HPI information obtained from: facility chart records, facility staff, patient report and [facility] chart review.  Current issues are:           From hospital note:  Nimo Concepcion is an 88-year-old female with a history of frequent falls, previous adenocarcinoma of the breast, bilateral mastectomies, chronic reflux esophagitis, depression, diffuse idiopathic skeletal hyperostosis, insomnia, hyperlipidemia, and hypothyroidism.  She presented from home after having 2 falls the night before admission, she fell.  EMS was called.  She was evaluated, but not transported.  On the morning of admission, the patient ambulated to the bathroom with her walker.  She has trouble turning a corner with her walker.  She tripped up and fell.  She felt weak.  She denied injury, but it was noted that she had a bruise and tenderness in her right posterior lower rib cage.  She apparently hit the back of her head when she fell.      In retrospect, the patient did report some right flank pain in the days prior to admission, but she had no urinary tract symptoms.  She had no fever or chills.      In the emergency room, she had a white count of 13.9.  Her urinalysis was abnormal and ultimately the culture grew out pansensitive E. coli.  It was thought that she possibly had acute  pyelonephritis that created weakness that precipitated the falls.      Also on admission, she had a chest x-ray, which showed a small opacity at the right costophrenic angle.  A repeat chest x-ray showed this to be slightly enlarged; overlying this area was an ecchymotic area in the chest wall.  Ultimately, I suspect that this was a small traumatic pleural effusion and was not pneumonia.  She did, however, have some cough prior to admission and may have had a viral URI; however, her viral respiratory panel was negative.     In TCU, patient has no complaints, other than weakness and desire to work towards getting home.  Patient denies any flank pain, abdominal pain, chest pain, shortness of breath, lightheadedness, dizziness, falls.  Is standby assist.  Appetite fair sleeping fair.  Has history of chronic insomnia.    CODE STATUS/ADVANCE DIRECTIVES DISCUSSION:   CPR/Full code   Patient's living condition: lives with family, adult children     ALLERGIES:Patient has no known allergies.  PAST MEDICAL HISTORY:  has no past medical history on file.  PAST SURGICAL HISTORY:  has no past surgical history on file.  FAMILY HISTORY: family history is not on file.  SOCIAL HISTORY:  reports that  has never smoked. she has never used smokeless tobacco. She reports that she does not drink alcohol or use drugs.    Post Discharge Medication Reconciliation Status: discharge medications reconciled, continue medications without change.  Current Outpatient Medications   Medication Sig Dispense Refill     alendronate (FOSAMAX) 70 MG tablet Take 70 mg by mouth every 7 days       Cranberry 500 MG TABS Take 1 tablet by mouth daily       Cyanocobalamin (VITAMIN B-12 PO) Take 1 capsule by mouth daily       OMEPRAZOLE PO Take 20 mg by mouth 2 times daily       POTASSIUM CHLORIDE ER PO Take 30 mEq by mouth daily        sulfamethoxazole-trimethoprim (BACTRIM DS/SEPTRA DS) 800-160 MG tablet Take 1 tablet by mouth 2 times daily 14 tablet 0      TRAZODONE HCL PO Take 100 mg by mouth At Bedtime        VERAPAMIL HCL ER PO 24HR CAPSULE Take 100 mg by mouth daily         ROS:  10 point ROS of systems including Constitutional, Eyes, Respiratory, Cardiovascular, Gastroenterology, Genitourinary, Integumentary, Musculoskeletal, Psychiatric were all negative except for pertinent positives noted in my HPI.    Exam:  /86   Pulse 76   Temp 98.7  F (37.1  C)   Resp 16   Wt 68 kg (150 lb)   SpO2 96%   BMI 25.75 kg/m     GENERAL APPEARANCE:  Alert, in no distress, Sitting in wheelchair  ENT:  Mouth and posterior oropharynx normal, moist mucous membranes  RESP:  lungs clear to auscultation , no respiratory distress, No crackles, wheezing, or tenderness  CV:  Palpation and auscultation of heart done , regular rate and rhythm, no murmur, rub, or gallop, no edema  ABDOMEN:  normal bowel sounds, soft, nontender, no hepatosplenomegaly or other masses  M/S:   Gait and station abnormal Generalized weakness.  Wheelchair or standby assist with walker  SKIN:  No concerning lesions noted  NEURO:   NFD  PSYCH:  normal insight, judgement and memory, Affect flat.  Answers in one-word responses until more is elicited    Lab/Diagnostic data:  CBC RESULTS:   Recent Labs   Lab Test 01/18/19  0522 01/17/19  0633   WBC 10.4 11.1*   RBC 3.72* 3.77*   HGB 12.1 12.2   HCT 36.5 36.4   MCV 98 97   MCH 32.5 32.4   MCHC 33.2 33.5   RDW 13.2 13.2    151       Last Basic Metabolic Panel:  Recent Labs   Lab Test 01/18/19  0522 01/17/19  0633    140   POTASSIUM 4.0 3.6   CHLORIDE 112* 109   DIMITRIOS 7.6* 7.7*   CO2 25 24   BUN 15 16   CR 0.54 0.50*   * 101*       Liver Function Studies -   Recent Labs   Lab Test 01/16/19  1137 07/14/18  1527   PROTTOTAL 6.7* 6.2*   ALBUMIN 3.3* 3.2*   BILITOTAL 0.9 0.7   ALKPHOS 82 56   AST 10 10   ALT 12 14       TSH   Date Value Ref Range Status   11/26/2018 1.00 0.40 - 4.00 mU/L Final     ASSESSMENT/PLAN:  Acute pyelonephritis  On Bactrim  DS times 7 days and cranberry.  Complete course.  3 days ago white blood count down to 10.4, hemoglobin 12.1.  BMP generally within normal limits other than a slightly low calcium.  Anticipate good recovery with antibiotic    Pleural effusion  Lung sounds clear, no further crackles, no shortness of breath.  Suspect dissipating    Personal history of fall  Suspect related to weakness related to urinary tract infection.  No further falls in TCU.  Participating well with therapies.  Continue plan    Essential hypertension    On verapamil 100 and KCl 30  Blood pressure has been high but there are only 2 recordings available.  Will have nursing staff check it more frequently.  Blood pressures:  01/19/2019 09:22 173/86 mmHg  01/18/2019 21:48 170/83 mmHg    Gastroesophageal reflux disease, esophagitis presence not specified  Prior to admission was on Protonix.  Pharmacy changed to omeprazole 20 mg twice daily.  Stable    Primary insomnia  Chronic condition.  Managed with trazodone 100 mg nightly.  Continue PTA meds    Osteoporosis without current pathological fracture, unspecified osteoporosis type  Chronic condition.  Managed with Fosamax.  Continue PTA medications       Orders:  1. Check BP every shift x3 day. Report SBP>170    Total time spent with patient visit at the skilled nursing facility was 35 min including patient visit and review of past records. Greater than 50% of total time spent with counseling and coordinating care due to admission    Electronically signed by:  NEHA Clemons CNP                    Sincerely,        NEHA Clemons CNP

## 2019-01-25 VITALS
SYSTOLIC BLOOD PRESSURE: 123 MMHG | BODY MASS INDEX: 24.82 KG/M2 | WEIGHT: 145.4 LBS | HEIGHT: 64 IN | DIASTOLIC BLOOD PRESSURE: 64 MMHG | OXYGEN SATURATION: 96 % | HEART RATE: 66 BPM | TEMPERATURE: 98.8 F | RESPIRATION RATE: 16 BRPM

## 2019-01-25 ASSESSMENT — MIFFLIN-ST. JEOR: SCORE: 1074.53

## 2019-01-26 ENCOUNTER — NURSING HOME VISIT (OUTPATIENT)
Dept: GERIATRICS | Facility: CLINIC | Age: 84
End: 2019-01-26
Payer: COMMERCIAL

## 2019-01-26 DIAGNOSIS — Z53.9 ERRONEOUS ENCOUNTER--DISREGARD: Primary | ICD-10-CM

## 2019-01-30 ENCOUNTER — DISCHARGE SUMMARY NURSING HOME (OUTPATIENT)
Dept: GERIATRICS | Facility: CLINIC | Age: 84
End: 2019-01-30
Payer: COMMERCIAL

## 2019-01-30 VITALS
WEIGHT: 144.4 LBS | HEIGHT: 64 IN | SYSTOLIC BLOOD PRESSURE: 126 MMHG | TEMPERATURE: 97.1 F | HEART RATE: 74 BPM | OXYGEN SATURATION: 97 % | RESPIRATION RATE: 18 BRPM | BODY MASS INDEX: 24.65 KG/M2 | DIASTOLIC BLOOD PRESSURE: 72 MMHG

## 2019-01-30 DIAGNOSIS — K21.9 GASTROESOPHAGEAL REFLUX DISEASE, ESOPHAGITIS PRESENCE NOT SPECIFIED: ICD-10-CM

## 2019-01-30 DIAGNOSIS — Z91.81 PERSONAL HISTORY OF FALL: ICD-10-CM

## 2019-01-30 DIAGNOSIS — I10 ESSENTIAL HYPERTENSION: ICD-10-CM

## 2019-01-30 DIAGNOSIS — J90 PLEURAL EFFUSION: ICD-10-CM

## 2019-01-30 DIAGNOSIS — N10 ACUTE PYELONEPHRITIS: Primary | ICD-10-CM

## 2019-01-30 DIAGNOSIS — M81.0 OSTEOPOROSIS WITHOUT CURRENT PATHOLOGICAL FRACTURE, UNSPECIFIED OSTEOPOROSIS TYPE: ICD-10-CM

## 2019-01-30 DIAGNOSIS — F51.01 PRIMARY INSOMNIA: ICD-10-CM

## 2019-01-30 PROCEDURE — 99316 NF DSCHRG MGMT 30 MIN+: CPT | Performed by: NURSE PRACTITIONER

## 2019-01-30 ASSESSMENT — MIFFLIN-ST. JEOR: SCORE: 1069.99

## 2019-01-30 NOTE — PROGRESS NOTES
[place] [facility] DISCHARGE SUMMARY    PATIENT'S NAME: Nimo Concepcion  YOB: 1930  MEDICAL RECORD NUMBER:  [id number]  [facility] [[id number]]    PRIMARY CARE PROVIDER AND CLINIC RESPONSIBLE AFTER TRANSFER: Katarina Colon [facility] [address] [address] [facility] [address] / [facility] [id number]     TRANSFERRING PROVIDERS: NEHA Clemons CNP, Behzad Hollis MD  DATE OF SNF ADMISSION:  January / 18 / 2019  DATE OF SNF (anticipated) DISCHARGE:1-31-19  DISCHARGE DISPOSITION: [facility] Provider   RECENT HOSPITALIZATION/ED:  [facility] stay 1/16/19 to 1/18/19.     CODE STATUS/ADVANCE DIRECTIVES DISCUSSION:   CPR/Full code    No Known Allergies  Condition on Discharge:  Improving.  Function: Walker with standby assist or wheelchair  Cognitive Scores: SLUMS 16/30 and CPT 4.6 indicating need for supervision    Equipment: walker and wheelchair    DISCHARGE DIAGNOSIS:   1. Acute pyelonephritis    2. Personal history of fall    3. Pleural effusion    4. Essential hypertension    5. Gastroesophageal reflux disease, esophagitis presence not specified    6. Primary insomnia    7. Osteoporosis without current pathological fracture, unspecified osteoporosis type        HPI Nursing Facility Course:  HPI information obtained from: facility chart records, facility staff and patient report.    Acute pyelonephritis  Personal history of fall  History of falls probably secondary to weakness due to e coli pyelonephritis.   Completed 7-day course of Bactrim.  Asymptomatic.  White count coming down.  Hemoglobin stable.  Has progressed well with therapy.  They believe she is reached maximum potential.  Due to her cognitive impairments, and recommend that she not live alone.  Plan is for daughter to be with her until assisted living or other arrangements can be made     Pleural effusion  Lung sounds clear, no further crackles, no shortness of  "breath.  Suspect dissipating  PCP to follow      Essential hypertension     On verapamil 100 and KCl 30  Blood pressure had been high initially, but then came down to normal range.  This morning had one elevated reading.  PCP to follow .  Blood pressures:  01/30/2019 08:54 167/80 mmHg  01/28/2019 23:34 126/72 mmHg  01/28/2019 21:39 131/68 mmHg  01/27/2019 09:01 122/80 mmHg  01/26/2019 04:08 119/65 mmHg  01/25/2019 22:29 129/60 mmHg  01/24/2019 21:51 123/64 mmHg     Gastroesophageal reflux disease, esophagitis presence not specified  Prior to admission was on Protonix.  Pharmacy changed to omeprazole 20 mg twice daily.  Stable     Primary insomnia  Chronic condition.  Managed with trazodone 100 mg nightly.  Continue PTA meds     Osteoporosis without current pathological fracture, unspecified osteoporosis type  Chronic condition.  Managed with Fosamax.  Continue PTA medications      PAST MEDICAL HISTORY:  has no past medical history on file.    DISCHARGE MEDICATIONS:  Current Outpatient Medications   Medication Sig Dispense Refill     alendronate (FOSAMAX) 70 MG tablet Take 70 mg by mouth every 7 days       Cranberry 500 MG TABS Take 1 tablet by mouth daily       Cyanocobalamin (VITAMIN B-12 PO) Take 1 capsule by mouth daily       OMEPRAZOLE PO Take 20 mg by mouth 2 times daily       POTASSIUM CHLORIDE ER PO Take 30 mEq by mouth daily        TRAZODONE HCL PO Take 100 mg by mouth At Bedtime        VERAPAMIL HCL ER PO 24HR CAPSULE Take 100 mg by mouth daily         MEDICATION CHANGES/RATIONALE:   See above  Controlled medications sent with patient:   not applicable/none     ROS:    4 point ROS including Respiratory, CV, GI and , other than that noted in the HPI,  is negative    Physical Exam:   Vitals: /72   Pulse 74   Temp 97.1  F (36.2  C)   Resp 18   Ht 1.626 m (5' 4\")   Wt 65.5 kg (144 lb 6.4 oz)   SpO2 97%   BMI 24.79 kg/m    BMI= Body mass index is 24.79 kg/m .    GENERAL APPEARANCE:  Alert, in no " distress  RESP:  lungs clear to auscultation , no respiratory distress  CV:  Palpation and auscultation of heart done , regular rate and rhythm, no murmur, rub, or gallop  ABDOMEN:  no guarding or rebound, bowel sounds normal  M/S:   Gait and station abnormal Generalized weakness, wheelchair or standby assist with walker  PSYCH:  memory impaired , affect and mood normal, Flat affect    DISCHARGE PLAN:  Occupational Therapy, Physical Therapy, Registered Nurse, Home Health Aide,  and From:  [facility]  Patient instructed to follow-up with:  PCP in 7 days      Current [place] scheduled appointments:  No future appointments    MTM referral needed and placed by this provider: No will have home care    SNF labs   CBC RESULTS:   Recent Labs   Lab Test 01/18/19  0522 01/17/19  0633   WBC 10.4 11.1*   RBC 3.72* 3.77*   HGB 12.1 12.2   HCT 36.5 36.4   MCV 98 97   MCH 32.5 32.4   MCHC 33.2 33.5   RDW 13.2 13.2    151       Last Basic Metabolic Panel:  Recent Labs   Lab Test 01/18/19  0522 01/17/19  0633    140   POTASSIUM 4.0 3.6   CHLORIDE 112* 109   DIMITRIOS 7.6* 7.7*   CO2 25 24   BUN 15 16   CR 0.54 0.50*   * 101*       Liver Function Studies -   Recent Labs   Lab Test 01/16/19  1137 07/14/18  1527   PROTTOTAL 6.7* 6.2*   ALBUMIN 3.3* 3.2*   BILITOTAL 0.9 0.7   ALKPHOS 82 56   AST 10 10   ALT 12 14       TSH   Date Value Ref Range Status   11/26/2018 1.00 0.40 - 4.00 mU/L Final       Discharge Treatments: Home with home care  Documentation of Face to Face and Certification for Home Health Services    I certify that patient, Nimo Concepcion is under my care and that I had a face-to-face encounter that meets the physician face-to-face encounter requirements with this patient on: 1/30/2019.    This encounter with the patient was in whole, or in part, for the following medical condition, which is the primary reason for Home Health Care: (N10) Acute pyelonephritis  (primary encounter  diagnosis)    (Z91.81) Personal history of fall    (J90) Pleural effusion    (I10) Essential hypertension    (K21.9) Gastroesophageal reflux disease, esophagitis presence not specified    (F51.01) Primary insomnia    (M81.0) Osteoporosis without current pathological fracture, unspecified osteoporosis type    I certify that, based on my findings, the following services are medically necessary Home Health Services: Nursing, Occupational Therapy and Physical Therapy    My clinical findings support the need for the above services because: Nurse is needed: To assess blood pressure, urinary function, ability to manage medications and medical care after changes in medications or other medical regimen.., Occupational Therapy Services are needed to assess and treat cognitive ability and address ADL safety due to impairment in cognitive impairment, history of falls. and Physical Therapy Services are needed to assess and treat the following functional impairments: Endurance, ambulation, falls risk.    Further, I certify that my clinical findings support that this patient is homebound (i.e. absences from home require considerable and taxing effort and are for medical reasons or Denominational services or infrequently or of short duration when for other reasons) because: Requires assistance of another person or specialized equipment to access medical services because patient: Poor endurance, requires standby assist and use of assistive device.    Based on the above findings, I certify that this patient is confined to the home and needs intermittent skilled nursing care, physical therapy and/or speech therapy.  The patient is under my care, and I have initiated the establishment of the plan of care.  This patient will be followed by a physician who will periodically review the plan of care.    Physician/Provider to provide follow up care: Cheyanne Muhammad certified Physician at time of discharge: Shannan GUERRERO  Alessandro  Electronically signed by Dr. Tameka Goldstein MD, and only signing for initial order. Please send all follow up questions and concerns or needed follow up signatures to the PCP Cheyanne Muhammad.      TOTAL DISCHARGE TIME:   Greater than 30 minutes  Electronically signed by:  NEHA Clemons CNP

## 2019-01-30 NOTE — LETTER
1/30/2019        RE: Nimo Concepcion  [address] [facility] [place] [address]          [place] [facility] DISCHARGE SUMMARY    PATIENT'S NAME: Nimo Concepcion  YOB: 1930  MEDICAL RECORD NUMBER:  [id number]  Place of Service where encounter took place:  [facility] ([facility]) [[id number]]    PRIMARY CARE PROVIDER AND CLINIC RESPONSIBLE AFTER TRANSFER: Katarina Colon [facility] [address] [address] [address]     TRANSFERRING PROVIDERS: NEHA Clemons CNP, Behzad Hollis MD  DATE OF SNF ADMISSION:  January / 18 / 2019  DATE OF SNF (anticipated) DISCHARGE:1-31-19  DISCHARGE DISPOSITION: [facility] Provider   RECENT HOSPITALIZATION/ED:  [facility] stay 1/16/19 to 1/18/19.     CODE STATUS/ADVANCE DIRECTIVES DISCUSSION:   CPR/Full code    No Known Allergies  Condition on Discharge:  Improving.  Function: Walker with standby assist or wheelchair  Cognitive Scores: SLUMS 16/30 and CPT 4.6 indicating need for supervision    Equipment: walker and wheelchair    DISCHARGE DIAGNOSIS:   1. Acute pyelonephritis    2. Personal history of fall    3. Pleural effusion    4. Essential hypertension    5. Gastroesophageal reflux disease, esophagitis presence not specified    6. Primary insomnia    7. Osteoporosis without current pathological fracture, unspecified osteoporosis type        HPI Nursing Facility Course:  HPI information obtained from: facility chart records, facility staff and patient report.    Acute pyelonephritis  Personal history of fall  History of falls probably secondary to weakness due to e coli pyelonephritis.   Completed 7-day course of Bactrim.  Asymptomatic.  White count coming down.  Hemoglobin stable.  Has progressed well with therapy.  They believe she is reached maximum potential.  Due to her cognitive impairments, and recommend that she not live alone.  Plan is for daughter to be with her until assisted living or other  "arrangements can be made     Pleural effusion  Lung sounds clear, no further crackles, no shortness of breath.  Suspect dissipating  PCP to follow      Essential hypertension     On verapamil 100 and KCl 30  Blood pressure had been high initially, but then came down to normal range.  This morning had one elevated reading.  PCP to follow .  Blood pressures:  01/30/2019 08:54 167/80 mmHg  01/28/2019 23:34 126/72 mmHg  01/28/2019 21:39 131/68 mmHg  01/27/2019 09:01 122/80 mmHg  01/26/2019 04:08 119/65 mmHg  01/25/2019 22:29 129/60 mmHg  01/24/2019 21:51 123/64 mmHg     Gastroesophageal reflux disease, esophagitis presence not specified  Prior to admission was on Protonix.  Pharmacy changed to omeprazole 20 mg twice daily.  Stable     Primary insomnia  Chronic condition.  Managed with trazodone 100 mg nightly.  Continue PTA meds     Osteoporosis without current pathological fracture, unspecified osteoporosis type  Chronic condition.  Managed with Fosamax.  Continue PTA medications      PAST MEDICAL HISTORY:  has no past medical history on file.    DISCHARGE MEDICATIONS:  Current Outpatient Medications   Medication Sig Dispense Refill     alendronate (FOSAMAX) 70 MG tablet Take 70 mg by mouth every 7 days       Cranberry 500 MG TABS Take 1 tablet by mouth daily       Cyanocobalamin (VITAMIN B-12 PO) Take 1 capsule by mouth daily       OMEPRAZOLE PO Take 20 mg by mouth 2 times daily       POTASSIUM CHLORIDE ER PO Take 30 mEq by mouth daily        TRAZODONE HCL PO Take 100 mg by mouth At Bedtime        VERAPAMIL HCL ER PO 24HR CAPSULE Take 100 mg by mouth daily         MEDICATION CHANGES/RATIONALE:   See above  Controlled medications sent with patient:   not applicable/none     ROS:    4 point ROS including Respiratory, CV, GI and , other than that noted in the HPI,  is negative    Physical Exam:   Vitals: /72   Pulse 74   Temp 97.1  F (36.2  C)   Resp 18   Ht 1.626 m (5' 4\")   Wt 65.5 kg (144 lb 6.4 oz)   " SpO2 97%   BMI 24.79 kg/m     BMI= Body mass index is 24.79 kg/m .    GENERAL APPEARANCE:  Alert, in no distress  RESP:  lungs clear to auscultation , no respiratory distress  CV:  Palpation and auscultation of heart done , regular rate and rhythm, no murmur, rub, or gallop  ABDOMEN:  no guarding or rebound, bowel sounds normal  M/S:   Gait and station abnormal Generalized weakness, wheelchair or standby assist with walker  PSYCH:  memory impaired , affect and mood normal, Flat affect    DISCHARGE PLAN:  Occupational Therapy, Physical Therapy, Registered Nurse, Home Health Aide,  and From:  [facility]  Patient instructed to follow-up with:  PCP in 7 days      Current [place] scheduled appointments:  No future appointments    MTM referral needed and placed by this provider: No will have home care    SNF labs   CBC RESULTS:   Recent Labs   Lab Test 01/18/19  0522 01/17/19  0633   WBC 10.4 11.1*   RBC 3.72* 3.77*   HGB 12.1 12.2   HCT 36.5 36.4   MCV 98 97   MCH 32.5 32.4   MCHC 33.2 33.5   RDW 13.2 13.2    151       Last Basic Metabolic Panel:  Recent Labs   Lab Test 01/18/19  0522 01/17/19  0633    140   POTASSIUM 4.0 3.6   CHLORIDE 112* 109   DIMITRIOS 7.6* 7.7*   CO2 25 24   BUN 15 16   CR 0.54 0.50*   * 101*       Liver Function Studies -   Recent Labs   Lab Test 01/16/19  1137 07/14/18  1527   PROTTOTAL 6.7* 6.2*   ALBUMIN 3.3* 3.2*   BILITOTAL 0.9 0.7   ALKPHOS 82 56   AST 10 10   ALT 12 14       TSH   Date Value Ref Range Status   11/26/2018 1.00 0.40 - 4.00 mU/L Final       Discharge Treatments: Home with home care  Documentation of Face to Face and Certification for Home Health Services    I certify that patient, Nimo Concepcion is under my care and that I had a face-to-face encounter that meets the physician face-to-face encounter requirements with this patient on: 1/30/2019.    This encounter with the patient was in whole, or in part, for the following medical  condition, which is the primary reason for Home Health Care: (N10) Acute pyelonephritis  (primary encounter diagnosis)    (Z91.81) Personal history of fall    (J90) Pleural effusion    (I10) Essential hypertension    (K21.9) Gastroesophageal reflux disease, esophagitis presence not specified    (F51.01) Primary insomnia    (M81.0) Osteoporosis without current pathological fracture, unspecified osteoporosis type    I certify that, based on my findings, the following services are medically necessary Home Health Services: Nursing, Occupational Therapy and Physical Therapy    My clinical findings support the need for the above services because: Nurse is needed: To assess blood pressure, urinary function, ability to manage medications and medical care after changes in medications or other medical regimen.., Occupational Therapy Services are needed to assess and treat cognitive ability and address ADL safety due to impairment in cognitive impairment, history of falls. and Physical Therapy Services are needed to assess and treat the following functional impairments: Endurance, ambulation, falls risk.    Further, I certify that my clinical findings support that this patient is homebound (i.e. absences from home require considerable and taxing effort and are for medical reasons or Rastafari services or infrequently or of short duration when for other reasons) because: Requires assistance of another person or specialized equipment to access medical services because patient: Poor endurance, requires standby assist and use of assistive device.    Based on the above findings, I certify that this patient is confined to the home and needs intermittent skilled nursing care, physical therapy and/or speech therapy.  The patient is under my care, and I have initiated the establishment of the plan of care.  This patient will be followed by a physician who will periodically review the plan of care.    Physician/Provider to provide  follow up care: Cheyanne Muhammad    Responsible [place] certified Physician at time of discharge: Shannan Francois  Electronically signed by Dr. Tameka Goldstein MD, and only signing for initial order. Please send all follow up questions and concerns or needed follow up signatures to the PCP Cheyanne Muhammad.      TOTAL DISCHARGE TIME:   Greater than 30 minutes  Electronically signed by:  NEHA Clemons CNP      Sincerely,        NEHA Clemons CNP

## 2019-01-31 ENCOUNTER — COMMUNICATION - HEALTHEAST (OUTPATIENT)
Dept: FAMILY MEDICINE | Facility: CLINIC | Age: 84
End: 2019-01-31

## 2019-01-31 ENCOUNTER — MEDICAL CORRESPONDENCE (OUTPATIENT)
Dept: HEALTH INFORMATION MANAGEMENT | Facility: CLINIC | Age: 84
End: 2019-01-31

## 2019-01-31 ENCOUNTER — TELEPHONE (OUTPATIENT)
Dept: FAMILY MEDICINE | Facility: CLINIC | Age: 84
End: 2019-01-31

## 2019-02-01 ENCOUNTER — PATIENT OUTREACH (OUTPATIENT)
Dept: CARE COORDINATION | Facility: CLINIC | Age: 84
End: 2019-02-01

## 2019-02-04 NOTE — PROGRESS NOTES
Clinic Care Coordination Contact    Situation: Patient chart reviewed by care coordinator.    Background: CC routed encounter to call after discharge from TCU.     Assessment: Patient is using [facility] for primary care.  Sent message to that care coordinator for follow up.    Plan/Recommendations: No further action by this CC.     Jocelyn Varela,   [facility]  [email or web address]  [phone]

## 2019-02-05 ENCOUNTER — ALLIED HEALTH/NURSE VISIT (OUTPATIENT)
Dept: FAMILY MEDICINE | Facility: CLINIC | Age: 84
End: 2019-02-05
Payer: COMMERCIAL
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
## 2019-02-05 PROCEDURE — 99207 ZZC NO CHARGE NURSE ONLY: CPT

## 2019-02-05 NOTE — PROGRESS NOTES
Daughter is asking if can bring in urine sample.  Advised needs to be seen and appointment was made.  Was recently in [place] recovering from falls and UTI  Emily Rolle RN

## 2019-02-06 ENCOUNTER — OFFICE VISIT (OUTPATIENT)
Dept: FAMILY MEDICINE | Facility: CLINIC | Age: 84
End: 2019-02-06
Payer: COMMERCIAL

## 2019-02-06 VITALS
BODY MASS INDEX: 25.61 KG/M2 | HEIGHT: 64 IN | HEART RATE: 88 BPM | TEMPERATURE: 97 F | WEIGHT: 150 LBS | DIASTOLIC BLOOD PRESSURE: 70 MMHG | SYSTOLIC BLOOD PRESSURE: 132 MMHG

## 2019-02-06 DIAGNOSIS — F32.A DEPRESSION, UNSPECIFIED DEPRESSION TYPE: Primary | ICD-10-CM

## 2019-02-06 DIAGNOSIS — R39.89 URINARY PROBLEM: ICD-10-CM

## 2019-02-06 DIAGNOSIS — Z63.6 CAREGIVER STRESS: ICD-10-CM

## 2019-02-06 LAB
ALBUMIN UR-MCNC: NEGATIVE MG/DL
APPEARANCE UR: CLEAR
BACTERIA #/AREA URNS HPF: ABNORMAL /HPF
BILIRUB UR QL STRIP: NEGATIVE
COLOR UR AUTO: YELLOW
GLUCOSE UR STRIP-MCNC: NEGATIVE MG/DL
HGB UR QL STRIP: ABNORMAL
KETONES UR STRIP-MCNC: NEGATIVE MG/DL
LEUKOCYTE ESTERASE UR QL STRIP: NEGATIVE
MUCOUS THREADS #/AREA URNS LPF: PRESENT /LPF
NITRATE UR QL: NEGATIVE
PH UR STRIP: 5.5 PH (ref 5–7)
RBC #/AREA URNS AUTO: ABNORMAL /HPF
SOURCE: ABNORMAL
SP GR UR STRIP: >1.03 (ref 1–1.03)
UROBILINOGEN UR STRIP-ACNC: 0.2 EU/DL (ref 0.2–1)
WBC #/AREA URNS AUTO: ABNORMAL /HPF

## 2019-02-06 PROCEDURE — 81001 URINALYSIS AUTO W/SCOPE: CPT | Performed by: NURSE PRACTITIONER

## 2019-02-06 PROCEDURE — 87086 URINE CULTURE/COLONY COUNT: CPT | Performed by: NURSE PRACTITIONER

## 2019-02-06 PROCEDURE — 99214 OFFICE O/P EST MOD 30 MIN: CPT | Performed by: NURSE PRACTITIONER

## 2019-02-06 RX ORDER — BUPROPION HYDROCHLORIDE 150 MG/1
150 TABLET, EXTENDED RELEASE ORAL 2 TIMES DAILY
Qty: 180 TABLET | Refills: 3 | Status: SHIPPED | OUTPATIENT
Start: 2019-02-06 | End: 2019-03-04

## 2019-02-06 SDOH — SOCIAL STABILITY - SOCIAL INSECURITY: DEPENDENT RELATIVE NEEDING CARE AT HOME: Z63.6

## 2019-02-06 ASSESSMENT — MIFFLIN-ST. JEOR: SCORE: 1095.4

## 2019-02-06 NOTE — PROGRESS NOTES
Will have   SUBJECTIVE:   Nimo Concepcion is a 88 year old female who presents to clinic today for the following health issues:        Hospital Follow-up Visit:    Hospital/Nursing Home/IP Rehab Facility: [facility]  Date of Admission: 1/16/19  Date of Discharge: 1/18/19  Reason(s) for Admission: Fall in home, UTI            Problems taking medications regularly:  None       Medication changes since discharge: None       Problems adhering to non-medication therapy:  None    Summary of hospitalization:  [facility] discharge summary reviewed  Diagnostic Tests/Treatments reviewed.  Follow up needed: none  Other Healthcare Providers Involved in Patient s Care:         None  Update since discharge: improved.     Post Discharge Medication Reconciliation: discharge medications reconciled, continue medications without change.  Plan of care communicated with patient and family     Coding guidelines for this visit:  Type of Medical   Decision Making Face-to-Face Visit       within 7 Days of discharge Face-to-Face Visit        within 14 days of discharge   Moderate Complexity [id number]   High Complexity [id number]              Problem list and histories reviewed & adjusted, as indicated.  Additional history: as documented    Patient Active Problem List   Diagnosis     Fall at home     Fall     Hypothyroidism, unspecified type     Adenocarcinoma of breast (H)     Chronic reflux esophagitis     DISH (diffuse idiopathic skeletal hyperostosis)     Lightheadedness     Moderate recurrent major depression (H)     Osteoporosis     Other malaise and fatigue     Pain in joint, shoulder region     Primary hypercholesterolemia     Primary insomnia     Primary localized osteoarthrosis of shoulder region     Sciatica     Vitamin D deficiency     UTI (urinary tract infection)     CAP (community acquired pneumonia)     History reviewed. No pertinent surgical history.    Social History     Tobacco Use     Smoking  "status: Never Smoker     Smokeless tobacco: Never Used   Substance Use Topics     Alcohol use: No     History reviewed. No pertinent family history.      Current Outpatient Medications   Medication Sig Dispense Refill     alendronate (FOSAMAX) 70 MG tablet Take 70 mg by mouth every 7 days       buPROPion (WELLBUTRIN SR) 150 MG 12 hr tablet Take 1 tablet (150 mg) by mouth 2 times daily 180 tablet 3     Cranberry 500 MG TABS Take 1 tablet by mouth daily       Cyanocobalamin (VITAMIN B-12 PO) Take 1 capsule by mouth daily       OMEPRAZOLE PO Take 20 mg by mouth 2 times daily       POTASSIUM CHLORIDE ER PO Take 30 mEq by mouth daily        TRAZODONE HCL PO Take 100 mg by mouth At Bedtime        VERAPAMIL HCL ER PO 24HR CAPSULE Take 100 mg by mouth daily       No Known Allergies  Recent Labs   Lab Test 01/18/19  0522 01/17/19  0633 01/16/19  1137 11/26/18  1213 07/14/18  1527 12/27/17  1128   ALT  --   --  12  --  14 13   CR 0.54 0.50* 0.52  --  0.43* 0.49*   GFRESTIMATED 84 86 85  --  >90 >90   GFRESTBLACK >90 >90 >90  --  >90 >90   POTASSIUM 4.0 3.6 3.6  --  3.3* 3.8   TSH  --   --   --  1.00  --   --       BP Readings from Last 3 Encounters:   02/06/19 132/70   01/30/19 126/72   01/28/19 122/80    Wt Readings from Last 3 Encounters:   02/06/19 68 kg (150 lb)   01/30/19 65.5 kg (144 lb 6.4 oz)   01/28/19 65.2 kg (143 lb 12.8 oz)                    Reviewed and updated as needed this visit by clinical staff       Reviewed and updated as needed this visit by Provider         ROS:  Constitutional, HEENT, cardiovascular, pulmonary, gi and gu systems are negative, except as otherwise noted.    OBJECTIVE:     /70 (BP Location: Right arm, Cuff Size: Adult Regular)   Pulse 88   Temp 97  F (36.1  C) (Tympanic)   Ht 1.626 m (5' 4\")   Wt 68 kg (150 lb)   BMI 25.75 kg/m    Body mass index is 25.75 kg/m .  GENERAL: healthy, alert and no distress  EYES: Eyes grossly normal to inspection, PERRL and conjunctivae and sclerae " normal  HENT: ear canals and TM's normal, nose and mouth without ulcers or lesions  NECK: no adenopathy, no asymmetry, masses, or scars and thyroid normal to palpation  RESP: lungs clear to auscultation - no rales, rhonchi or wheezes  CV: regular rate and rhythm, normal S1 S2, no S3 or S4, no murmur, click or rub, no peripheral edema and peripheral pulses strong  MS: no gross musculoskeletal defects noted, no edema  SKIN: no suspicious lesions or rashes  NEURO: Normal strength and tone, mentation intact and speech normal  PSYCH: mentation appears normal, affect normal/bright    Results for orders placed or performed in visit on 02/06/19   *UA reflex to Microscopic and Culture ([place] and [facility] (except [place] and [place])   Result Value Ref Range    Color Urine Yellow     Appearance Urine Clear     Glucose Urine Negative NEG^Negative mg/dL    Bilirubin Urine Negative NEG^Negative    Ketones Urine Negative NEG^Negative mg/dL    Specific Gravity Urine >1.030 1.003 - 1.035    Blood Urine Trace (A) NEG^Negative    pH Urine 5.5 5.0 - 7.0 pH    Protein Albumin Urine Negative NEG^Negative mg/dL    Urobilinogen Urine 0.2 0.2 - 1.0 EU/dL    Nitrite Urine Negative NEG^Negative    Leukocyte Esterase Urine Negative NEG^Negative    Source Midstream Urine    Urine Microscopic   Result Value Ref Range    WBC Urine 0 - 5 OTO5^0 - 5 /HPF    RBC Urine 2-5 (A) OTO2^O - 2 /HPF    Bacteria Urine Few (A) NEG^Negative /HPF    Mucous Urine Present (A) NEG^Negative /LPF   Urine Culture Aerobic Bacterial   Result Value Ref Range    Specimen Description Midstream Urine     Special Requests Specimen received in preservative     Culture Micro PENDING          ASSESSMENT/PLAN:   (F32.9) Depression, unspecified depression type  (primary encounter diagnosis)  Comment: Patient has history of anxiety depression has been off of her medications for greater than 6 months.  Will restart Wellbutrin today.  Plan: buPROPion (WELLBUTRIN SR)  150 MG 12 hr tablet            (R39.89) Urinary problem  Comment: Patient recently discharged from due to urinary tract infection increased confusion and frequent falls at home.  Patient has a history of an L4 fracture.  Urine today negative patient has greatly improved does have home health occupational therapy and physical therapy set up.  Plan: *UA reflex to Microscopic and Culture ([place]         and [facility] (except [place] and         [place]), Urine Microscopic, Urine Culture         Aerobic Bacterial           (Z63.6) Caregiver stress  Comment: Did review with family current living situation his daughter is her primary caretaker.  She has been taking care of her mother for the last 3-4 months and also has a home in the [facility].  Patient presently lives on her own.  Reviewed options for assisted living and caregiver assistance.  Did put in a referral for clinical care coordination.  Did review with patient and daughter evaluation for neuropsych.  Daughter does not feel her mother has great significant memory loss just has had some recent impairments due to L4 fracture.  Plan: CARE COORDINATION REFERRAL          He was sent over to pharmacy by error did contact pharmacy and cancel prescription.    NEHA Wilburn [facility]

## 2019-02-07 ENCOUNTER — PATIENT OUTREACH (OUTPATIENT)
Dept: CARE COORDINATION | Facility: CLINIC | Age: 84
End: 2019-02-07

## 2019-02-07 LAB
BACTERIA SPEC CULT: NO GROWTH
Lab: NORMAL
SPECIMEN SOURCE: NORMAL

## 2019-02-07 NOTE — PROGRESS NOTES
Clinic Care Coordination Contact  Care Team Conversations    Received referral for CC from PCP for caregiver burn out and future planning on housing.      Pt is receiving home care services through [facility].  Call placed to  Nella Franco RN.  She will have orders placed for Social Work to meet with pt and daughter in the home vs Clinic care coordination calling pt/daughter.      Plan;  Home care will have  meet with pt/daughter.  Clinic care coordinator will review chart in about two weeks for continued home care or outreach.     ALESSANDRA Keith, Clinic Care Coordinator 2/7/2019   12:20 PM  [phone]

## 2019-02-08 ENCOUNTER — DOCUMENTATION ONLY (OUTPATIENT)
Dept: FAMILY MEDICINE | Facility: CLINIC | Age: 84
End: 2019-02-08

## 2019-02-08 NOTE — PROGRESS NOTES
[facility] now requests orders and shares plan of care/discharge summaries for some patients through [employer].  Please REPLY TO THIS MESSAGE OR ROUTE BACK TO THE AUTHOR in order to give authorization for orders when needed.  This is considered a verbal order, you will still receive a faxed copy of orders for signature.  Thank you for your assistance in improving collaboration for our patients.    ORDER/PT 2x/wk for 2 weeks for strengthening, balance, gait and transfer training.

## 2019-02-12 ENCOUNTER — TELEPHONE (OUTPATIENT)
Dept: FAMILY MEDICINE | Facility: CLINIC | Age: 84
End: 2019-02-12

## 2019-02-12 NOTE — TELEPHONE ENCOUNTER
Brigitte Ballesteros is requesting a call back on medication questions on Wellbutrin, if patient should restart and Trazadone, as daughter feels it is not effective due to patient being restless during the night. Please call brigitte Ballesteros at patient's home number

## 2019-02-18 NOTE — TELEPHONE ENCOUNTER
Evelyn MEZA with  HC is calling on the message left on 2/12/19?  Please advise and  Call her directly at [phone]    Cata Main  [facility]

## 2019-02-18 NOTE — TELEPHONE ENCOUNTER
[facility] LPN called:  Daughter has questions regarding starting Wellbutrin for depression. States she is concerned that is will interact with other medications. Also states she would like to stop trazodone and try something else for sleep as it causes her constipation.     Advised to start the Wellbutrin and see how pt does. Stop the trazodone and continue to monitor at this time. Will route to pcp to advise further. Coleen Guthrie RN

## 2019-02-19 ENCOUNTER — DOCUMENTATION ONLY (OUTPATIENT)
Dept: FAMILY MEDICINE | Facility: CLINIC | Age: 84
End: 2019-02-19

## 2019-02-19 ENCOUNTER — TELEPHONE (OUTPATIENT)
Dept: FAMILY MEDICINE | Facility: CLINIC | Age: 84
End: 2019-02-19

## 2019-02-19 DIAGNOSIS — J01.90 ACUTE SINUSITIS WITH SYMPTOMS > 10 DAYS: Primary | ICD-10-CM

## 2019-02-19 DIAGNOSIS — R29.6 FALLS FREQUENTLY: Primary | ICD-10-CM

## 2019-02-19 DIAGNOSIS — Z53.9 DIAGNOSIS NOT YET DEFINED: Primary | ICD-10-CM

## 2019-02-19 RX ORDER — LEVOFLOXACIN 500 MG/1
500 TABLET, FILM COATED ORAL DAILY
Qty: 5 TABLET | Refills: 0 | Status: SHIPPED | OUTPATIENT
Start: 2019-02-19 | End: 2019-05-28

## 2019-02-19 NOTE — PROGRESS NOTES
[facility] now requests orders and shares plan of care/discharge summaries for some patients through [employer].  Please REPLY TO THIS MESSAGE OR ROUTE BACK TO THE AUTHOR in order to give authorization for orders when needed.  This is considered a verbal order, you will still receive a faxed copy of orders for signature.  Thank you for your assistance in improving collaboration for our patients.    ORDER    Skilled OT 1 week 1; 2 week 3 For cognitive assessment and ADL retraining.  Goal: 1. Pt will participate in cognitive evaluation to assist in determining pts current level of safety and function with ADLs/IADLs.    2, Pt/caregivers will be instructed in home safety/service recommendations based on cognitive testing.

## 2019-02-19 NOTE — TELEPHONE ENCOUNTER
MRI was negative for any masses or vascular concerns does look like she still continues to have a right sinus infection will treat with course of Levaquin.  If not improving after that patient should let me know we will refer her to ENT.  She will also continue her Flonase as directed.

## 2019-02-20 ENCOUNTER — PATIENT OUTREACH (OUTPATIENT)
Dept: CARE COORDINATION | Facility: CLINIC | Age: 84
End: 2019-02-20

## 2019-02-20 ENCOUNTER — DOCUMENTATION ONLY (OUTPATIENT)
Dept: FAMILY MEDICINE | Facility: CLINIC | Age: 84
End: 2019-02-20

## 2019-02-20 NOTE — LETTER
Health Care Home - Access Care Plan    About Me  Patient Name:  Nimo Campoverde    YOB: 1930  Age:                             88 year old   [facility] MRN:            [id number] Telephone Information:   Home Phone [phone]   Mobile Not on file.       Address:    [address] [place] [id number] Email address:  No e-mail address on record      Emergency Contact(s)  Name Relationship Lgl Grd Work Phone Home Phone Mobile Phone   1. JORGITO REVELES Daughter No  [phone]   2. DIEGO CAMPOVERDE Daughter No  [phone]             Health Maintenance: Routine Health maintenance Reviewed: Due/Overdue   Health Maintenance Due   Topic Date Due     DEPRESSION ACTION PLAN  04/11/1948     PHQ-9 Q6 MONTHS  [birth year]/11/1948     DTAP/TDAP/TD IMMUNIZATION (1 - Tdap) 04/11/1955     MEDICARE ANNUAL WELLNESS VISIT  04/11/1995     ZOSTER IMMUNIZATION (2 of 3) 02/13/2014     Please talk with your provider about what is needed or can continue to wait.        My Access Plan  Medical Emergency 917   Questions or concerns during clinic hours Primary Clinic Line, I will call the clinic directly: [facility] - [phone]   24 Hour Appointment Line [phone] or  [phone] [phone] [phone]) (toll free)   24 Hour Nurse Line [phone] (toll free)   Questions or concerns outside clinic hours 24 Hour Appointment Line, I will call the after-hours on-call line:   [facility] [phone] or [phone] ([phone]) (toll-free)   Preferred Urgent Care [facility], [phone]   Preferred Hospital [place], [place]  [phone]   Preferred Pharmacy [facility]     Behavioral Health Crisis Line The National Suicide Prevention Lifeline at 1-166.499.9846 or 911     My Care Team Members  Patient Care Team       Relationship Specialty Notifications Start End    Cheyanne Muhammad, APRN CNP PCP - General Nurse  Practitioner - Family  1/30/19     Phone: [phone] Fax: [phone] [address] [place] [address]    Cheyanne Muhammad APRN CNP PCP - Assigned PCP   11/2/18     Phone: [phone] Fax: [phone] [address] [place] [address]    Deepti Lizarraga LSW Clinic Care Coordinator Primary Care - CC Admissions 2/7/19     Phone: [phone] Fax: [phone]               My Medical and Care Information  Problem List   Patient Active Problem List   Diagnosis     Fall at home     Fall     Hypothyroidism, unspecified type     Adenocarcinoma of breast (H)     Chronic reflux esophagitis     DISH (diffuse idiopathic skeletal hyperostosis)     Lightheadedness     Moderate recurrent major depression (H)     Osteoporosis     Other malaise and fatigue     Pain in joint, shoulder region     Primary hypercholesterolemia     Primary insomnia     Primary localized osteoarthrosis of shoulder region     Sciatica     Vitamin D deficiency     UTI (urinary tract infection)     CAP (community acquired pneumonia)      Current Medications and Allergies:  See printed Medication Report

## 2019-02-20 NOTE — TELEPHONE ENCOUNTER
Recommend patient hold trazodone and will reevaluate at next visit for the insomnia.  Due to fall risk would not recommend any Ambien other sleep aids in that class..    Cheyanne Muhammad CNP

## 2019-02-20 NOTE — LETTER
I have sent a Consent to Communicate form for you to complete (as you wish) to allow us to discuss/share your personal health information with whomever you choose on your behalf.   I have highlighted the areas for you to review/address.  Please complete all that apply.  Please check the box for medical information if you allow the clinic to share personal health information with whomever you choose.  This form must be signed and dated to be valid.      This form does not , you can make a change to this document at any time by completing a new form.  There is a spot on the bottom if you want to add any specific instructions.

## 2019-02-20 NOTE — PROGRESS NOTES
Clinic Care Coordination Contact  [facility]/Voicemail    Referral Source: PCP  Clinical Data: Care Coordinator Outreach  Outreach attempted x 1.  Left message on voicemail with call back information and requested return call.  Plan:  Care Coordinator if no call back from home care RN is received  will call patient's daughter in 2-3 business days.  ALESSANDRA Keith, Clinic Care Coordinator 2/20/2019   10:05 AM  [phone]

## 2019-02-20 NOTE — LETTER
[facility] CARE COORDINATION - [place] [id number] [place] [address]  [phone]    February 22, 2019    Nimo Concepcion  [address] [place] [id number]      Dear Nimo,    I am a clinic care coordinator who works with NEHA Wilburn CNP at [facility]. I wanted to thank you for spending the time to talk with me.  I wanted to introduce myself and provide you with my contact information so that you can call me with questions or concerns about your health care. Below is a description of clinic care coordination and how I can further assist you.     The clinic care coordinator is a registered nurse and/or  who understand the health care system. The goal of clinic care coordination is to help you manage your health and improve access to the [facility] system in the most efficient manner. The registered nurse can assist you in meeting your health care goals by providing education, coordinating services, and strengthening the communication among your providers. The  can assist you with financial, behavioral, psychosocial, chemical dependency, counseling, and/or psychiatric resources.    Please feel free to contact me at [phone], with any questions or concerns. We at [facility] are focused on providing you with the highest-quality healthcare experience possible and that all starts with you.     Sincerely,     Deepti Lizarraga, [facility]  Clinic Care Coordination  [phone]    Enclosed: I have enclosed a copy of a 24 Hour Access Plan. This has helpful phone numbers for you to call when needed. Please keep this in an easy to access place to use as needed. and I have enclosed an Authorization to Discuss Protected Health Information form. Please review, fill out, sign and send back to me so I can make sure we have this on file to be able to talk to family/friends if you would like us to be able to.

## 2019-02-21 NOTE — PROGRESS NOTES
[facility] now requests orders and shares plan of care/discharge summaries for some patients through [employer].  Please REPLY TO THIS MESSAGE OR ROUTE BACK TO THE AUTHOR in order to give authorization for orders when needed.  This is considered a verbal order, you will still receive a faxed copy of orders for signature.  Thank you for your assistance in improving collaboration for our patients.    ORDER    Skilled OT 1 week 1; 2 week 3 for cognitive assessments and ADL retraining.

## 2019-02-22 NOTE — PROGRESS NOTES
Clinic Care Coordination Contact    Clinic Care Coordination Contact  OUTREACH    Referral Information:  Referral Source: PCP    Primary Diagnosis: Psychosocial    Chief Complaint   Patient presents with     Clinic Care Coordination - Follow-up             Universal Utilization: recent change to [facility]  Clinic Utilization  Difficulty keeping appointments:: No  Compliance Concerns: No  No-Show Concerns: No  No PCP office visit in Past Year: No  Utilization    Last refreshed: 2/20/2019 10:20 PM:  Hospital Admissions 1           Last refreshed: 2/20/2019 10:20 PM:  ED Visits 2           Last refreshed: 2/20/2019 10:20 PM:  No Show Count (past year) 0              Current as of: 2/20/2019 10:20 PM              Clinical Concerns:  Current Medical Concerns:  Pt is currently getting home care services through [facility]. Pt denied any concerns.     Current Behavioral Concerns: pt has diagnosis of depression. She denied any thought of harm and that it was any real problem. She said she was not taking any medications and when asked about the Wellbutrin she said she has not started it yet.   This was prescribed on 2-6-19.  Education Provided to patient: encouraged pt to start medication.  She said she was uncertain about taking it and yet had no real reason as to why she has not started it.  She said she will talk with her daughter Lesli about it.     Pain  Pain (GOAL):: No  Health Maintenance Reviewed: Due/Overdue   Health Maintenance Due   Topic Date Due     DEPRESSION ACTION PLAN  [birth year]11/1948     PHQ-9 Q6 MONTHS  04/11/1948     DTAP/TDAP/TD IMMUNIZATION (1 - Tdap) 04/11/1955     MEDICARE ANNUAL WELLNESS VISIT  04/11/1995     ZOSTER IMMUNIZATION (2 of 3) 02/13/2014       Clinical Pathway: None pt denies any concerns with her depression.  She has medications prescribed yet uncertain about taking.  Will continue to review on future outreaches.     Medication Management:  Pt does have Home care through  [facility].  Daughter also assists with reminding pt about her medications.  Pt said she sets them up in a strip on her own.      Functional Status:  Dependent ADLs:: (stand by assistance)  Dependent IADLs:: Shopping, Medication Management, Transportation, Cleaning, Cooking, Laundry  Bed or wheelchair confined:: No  Mobility Status: Independent w/Device  Fallen 2 or more times in the past year?: No(last fall first part of october)  Any fall with injury in the past year?: Yes    Living Situation:  Current living arrangement:: I live in a private home  Type of residence:: Private home - stairs - split entry.  She said she does not need to go down the stairs very often.  Daughter has been living with her since October. Pt said daughter would like pt to move in with her in [facility] yet pt is not really wanting to do this.  Offered to discuss options and pt was not open to discuss at this time.     Diet/Exercise/Sleep:  Diet:: Regular  Inadequate nutrition (GOAL):: No  Food Insecurity: No  Tube Feeding: No  Exercise:: Currently not exercising    Transportation:  Transportation concerns (GOAL):: No  Transportation means:: Family     Psychosocial:  Mental health DX:: Yes  Mental health DX how managed:: (not started wellbutrin )  Mental health management concern (GOAL):: No  Informal Support system:: Children, Neighbors     Financial/Insurance:   Financial/Insurance concerns (GOAL):: Yes  Pt's daughter assist pt with many areas of her daily living.  daughter assist with the cleaning, cooking, laundry, medication management, shopping, and transportation.     Resources and Interventions:  Current Resources:   List of home care services:: Skilled Nursing, Physicial Therapy, Occupational Therapy;   Community Resources: Home Care  Supplies used at home:: Incontinence Supplies  Equipment Currently Used at Home: walker, rolling, raised toilet, shower chair    Advance Care Plan/Directive  Advanced Care Plans/Directives [facility]:: [facility]          Goals:     Will discuss on future calls.  Pt was not willing to goal set at this time.   Patient/Caregiver understanding: n/a           Plan: SW to wait for daughter to call and pt gave verbal permission to talk with daughter.  Sw will send introduction letter, access plan, and consent to communicate form.  SW will call pt in about two weeks.     ALESSANDRA Keith, Clinic Care Coordinator 2/22/2019   10:09 AM  [phone]

## 2019-03-04 ENCOUNTER — OFFICE VISIT (OUTPATIENT)
Dept: FAMILY MEDICINE | Facility: CLINIC | Age: 84
End: 2019-03-04
Payer: COMMERCIAL

## 2019-03-04 ENCOUNTER — TELEPHONE (OUTPATIENT)
Dept: FAMILY MEDICINE | Facility: CLINIC | Age: 84
End: 2019-03-04

## 2019-03-04 VITALS
HEART RATE: 70 BPM | TEMPERATURE: 97.7 F | OXYGEN SATURATION: 97 % | WEIGHT: 151 LBS | BODY MASS INDEX: 25.78 KG/M2 | DIASTOLIC BLOOD PRESSURE: 80 MMHG | RESPIRATION RATE: 20 BRPM | HEIGHT: 64 IN | SYSTOLIC BLOOD PRESSURE: 152 MMHG

## 2019-03-04 DIAGNOSIS — E87.6 HYPOKALEMIA: ICD-10-CM

## 2019-03-04 DIAGNOSIS — I10 ESSENTIAL HYPERTENSION: ICD-10-CM

## 2019-03-04 DIAGNOSIS — F32.1 MODERATE MAJOR DEPRESSION (H): ICD-10-CM

## 2019-03-04 DIAGNOSIS — Z00.00 HEALTHCARE MAINTENANCE: ICD-10-CM

## 2019-03-04 DIAGNOSIS — M81.0 OSTEOPOROSIS WITHOUT CURRENT PATHOLOGICAL FRACTURE, UNSPECIFIED OSTEOPOROSIS TYPE: ICD-10-CM

## 2019-03-04 DIAGNOSIS — G47.00 INSOMNIA, UNSPECIFIED TYPE: ICD-10-CM

## 2019-03-04 DIAGNOSIS — K21.00 CHRONIC REFLUX ESOPHAGITIS: Primary | ICD-10-CM

## 2019-03-04 LAB
GLUCOSE SERPL-MCNC: 99 MG/DL (ref 70–99)
VIT B12 SERPL-MCNC: 1091 PG/ML (ref 193–986)

## 2019-03-04 PROCEDURE — 82607 VITAMIN B-12: CPT | Performed by: FAMILY MEDICINE

## 2019-03-04 PROCEDURE — 36415 COLL VENOUS BLD VENIPUNCTURE: CPT | Performed by: FAMILY MEDICINE

## 2019-03-04 PROCEDURE — 99214 OFFICE O/P EST MOD 30 MIN: CPT | Performed by: FAMILY MEDICINE

## 2019-03-04 PROCEDURE — 82947 ASSAY GLUCOSE BLOOD QUANT: CPT | Performed by: FAMILY MEDICINE

## 2019-03-04 RX ORDER — OMEPRAZOLE 40 MG/1
40 CAPSULE, DELAYED RELEASE ORAL DAILY
Qty: 30 CAPSULE | Refills: 11 | Status: SHIPPED | OUTPATIENT
Start: 2019-03-04 | End: 2019-05-29 | Stop reason: ALTCHOICE

## 2019-03-04 RX ORDER — ALENDRONATE SODIUM 70 MG/1
70 TABLET ORAL
Qty: 13 TABLET | Refills: 3 | Status: SHIPPED | OUTPATIENT
Start: 2019-03-04 | End: 2021-10-14

## 2019-03-04 RX ORDER — TRAZODONE HYDROCHLORIDE 100 MG/1
100 TABLET ORAL AT BEDTIME
Qty: 30 TABLET | Refills: 11 | Status: SHIPPED | OUTPATIENT
Start: 2019-03-04 | End: 2021-10-14

## 2019-03-04 RX ORDER — CITALOPRAM HYDROBROMIDE 40 MG/1
40 TABLET ORAL DAILY
Qty: 30 TABLET | Refills: 11 | Status: SHIPPED | OUTPATIENT
Start: 2019-03-04 | End: 2019-05-01

## 2019-03-04 RX ORDER — VERAPAMIL HYDROCHLORIDE 180 MG/1
180 CAPSULE, EXTENDED RELEASE ORAL DAILY
Qty: 30 CAPSULE | Refills: 11 | Status: SHIPPED | OUTPATIENT
Start: 2019-03-04 | End: 2020-03-04

## 2019-03-04 RX ORDER — TRAZODONE HYDROCHLORIDE 100 MG/1
100 TABLET ORAL AT BEDTIME
Qty: 30 TABLET | Refills: 11 | Status: CANCELLED | OUTPATIENT
Start: 2019-03-04

## 2019-03-04 RX ORDER — POTASSIUM CHLORIDE 750 MG/1
30 CAPSULE, EXTENDED RELEASE ORAL DAILY
Qty: 90 CAPSULE | Refills: 11 | Status: SHIPPED | OUTPATIENT
Start: 2019-03-04 | End: 2020-03-04

## 2019-03-04 RX ORDER — CITALOPRAM HYDROBROMIDE 40 MG/1
40 TABLET ORAL DAILY
Status: CANCELLED | COMMUNITY
Start: 2019-03-04

## 2019-03-04 ASSESSMENT — MIFFLIN-ST. JEOR: SCORE: 1099.93

## 2019-03-04 ASSESSMENT — PATIENT HEALTH QUESTIONNAIRE - PHQ9: SUM OF ALL RESPONSES TO PHQ QUESTIONS 1-9: 5

## 2019-03-04 NOTE — TELEPHONE ENCOUNTER
Reason for call:  Patient reporting a symptom    Symptom or request: Pt's daughter Shannon calling.  Pt saw Dr. Wren today and she was supposed to call back with meds that pt is taking that she did not know the names.  She states that pt takes Trazodone 2 tabs of the 100mg at bedtime and Citalopram 1 tab of the 40mg tabs.  Please call daughter and advise     Duration (how long have symptoms been present): ongoing    Have you been treated for this before? Yes    Additional comments:     Phone Number Lesli can be reached at:  Other phone number:  [phone]    Best Time:  any    Can we leave a detailed message on this number:  YES    Call taken on 3/4/2019 at 12:56 PM by Saima Apodaca

## 2019-03-04 NOTE — TELEPHONE ENCOUNTER
Dr. Wren,    The daughter of the patient calls us back with the medication she is taking that she was not sure of.  I have que'd them for you as the citalopram is not on her med list at all and the trazodone on her med list here is half the dose she is currently taking.  Please advise . Cata SPICER RN

## 2019-03-04 NOTE — PROGRESS NOTES
SUBJECTIVE:   Nimo Concepcion is a 88 year old female who presents to clinic today for the following health issues:      New Patient/Transfer of Care    Chief Complaint   Patient presents with     Establish Care     Here to establish care.     Insomnia     Discuss about insomnia.  She is waking up about once per night to use the bathroom.  For awhile she was getting up more often.     Depression     She is not taking the Wellbutrin medication.  They state it has bad side effects.         Current Outpatient Medications:      alendronate (FOSAMAX) 70 MG tablet, Take 1 tablet (70 mg) by mouth every 7 days, Disp: 13 tablet, Rfl: 3     Cranberry 500 MG TABS, Take 1 tablet by mouth daily, Disp: , Rfl:      Cyanocobalamin (VITAMIN B-12 PO), Take 1 capsule by mouth daily, Disp: , Rfl:      omeprazole (PRILOSEC) 40 MG DR capsule, Take 1 capsule (40 mg) by mouth daily, Disp: 30 capsule, Rfl: 11     potassium chloride ER (MICRO-K) 10 MEQ CR capsule, Take 3 capsules (30 mEq) by mouth daily, Disp: 90 capsule, Rfl: 11     traZODone (DESYREL) 100 MG tablet, Take 1 tablet (100 mg) by mouth At Bedtime, Disp: 30 tablet, Rfl: 11     verapamil ER (VERELAN) 180 MG 24 hr capsule, Take 1 capsule (180 mg) by mouth daily, Disp: 30 capsule, Rfl: 11    Patient Active Problem List   Diagnosis     Fall at home     Fall     Hypothyroidism, unspecified type     Adenocarcinoma of breast (H)     Chronic reflux esophagitis     DISH (diffuse idiopathic skeletal hyperostosis)     Lightheadedness     Moderate recurrent major depression (H)     Osteoporosis     Other malaise and fatigue     Pain in joint, shoulder region     Primary hypercholesterolemia     Primary insomnia     Primary localized osteoarthrosis of shoulder region     Sciatica     Vitamin D deficiency     UTI (urinary tract infection)     CAP (community acquired pneumonia)       Blood pressure 152/80, pulse 70, temperature 97.7  F (36.5  C), temperature source Tympanic, resp. rate 20,  "height 1.626 m (5' 4\"), weight 68.5 kg (151 lb), SpO2 97 %.    Exam:  GENERAL APPEARANCE: healthy, alert and no distress  EYES: EOMI,  PERRL  NECK: no adenopathy, no asymmetry, masses, or scars and thyroid normal to palpation  RESP: lungs clear to auscultation - no rales, rhonchi or wheezes  CV: regular rates and rhythm, normal S1 S2, no S3 or S4 and no murmur, click or rub -  SKIN: no suspicious lesions or rashes      (K21.0) Chronic reflux esophagitis  (primary encounter diagnosis)  Comment:   Plan: omeprazole (PRILOSEC) 40 MG DR capsule        Use the diet with no spicy food, or carbonated beverages. Use the Prilosec at 40 mg daily.   There should be no heartburn.     (I10) Essential hypertension  Comment:   Plan: verapamil ER (VERELAN) 180 MG 24 hr capsule        Increase the dose of the med to 180 mg daily. Use the non drug therapies.   The goal for the average BP at rest is under 130/80. Call or return to clinic if not at the goal .     (M81.0) Osteoporosis without current pathological fracture, unspecified osteoporosis type  Comment:   Plan: alendronate (FOSAMAX) 70 MG tablet        Use the daily calcium intake of 1500 mg. Use the fall precautions. There is a DEXA xray every 3 years.     (G47.00) Insomnia, unspecified type  Comment:   Plan: traZODone (DESYREL) 100 MG tablet        Consider the lowest effective dose of the med, either 25 mg or 50 mg or 75 mg or the max of 100 mg daily,   Before bed. Move the dose back so as not to be drowsy in the am.     (E87.6) Hypokalemia  Comment:   Plan: potassium chloride ER (MICRO-K) 10 MEQ CR         capsule        Take this as directed.     (Z00.00) Healthcare maintenance  Comment:   Plan: Glucose, Vitamin B12        Do these today and we will call the results.       Devaughn Wren"

## 2019-03-04 NOTE — LETTER
March 6, 2019      Nimo Concepcion  [address] [place] [phone]        Dear ,    We are writing to inform you of your test results.    The glucose is normal. The B12 is mildly high but no changes are needed.     Resulted Orders   Glucose   Result Value Ref Range    Glucose 99 70 - 99 mg/dL      Comment:      Fasting specimen   Vitamin B12   Result Value Ref Range    Vitamin B12 1,091 (H) 193 - 986 pg/mL       If you have any questions or concerns, please call the clinic at the number listed above.       Sincerely,        Devaughn Wren MD/cb

## 2019-03-04 NOTE — TELEPHONE ENCOUNTER
Please notify prescription sent to pharmacy for the Celexa 40 mg daily. We discussed the trazodone at the visit and reordered it.   Devaughn Wren

## 2019-03-04 NOTE — PATIENT INSTRUCTIONS
Thank you for choosing [facility].  You may be receiving a survey in the mail from Souleymane Hagen regarding your visit today.  Please take a few minutes to complete and return the survey to let us know how we are doing.      If you have questions or concerns, please contact us via [employer] or you can contact your care team at [phone].    Our Clinic hours are:  Monday 6:40 am  to 7:00 pm  Tuesday -Friday 6:40 am to 5:00 pm    The [facility] outpatient lab hours are:  Monday - Friday 6:10 am to 4:45 pm  Saturdays 7:00 am to 11:00 am  Appointments are required, call [phone]    If you have clinical questions after hours or would like to schedule an appointment,  call the clinic at [phone].    (K21.0) Chronic reflux esophagitis  (primary encounter diagnosis)  Comment:   Plan: omeprazole (PRILOSEC) 40 MG DR capsule        Use the diet with no spicy food, or carbonated beverages. Use the Prilosec at 40 mg daily.   There should be no heartburn.     (I10) Essential hypertension  Comment:   Plan: verapamil ER (VERELAN) 180 MG 24 hr capsule        Increase the dose of the med to 180 mg daily. Use the non drug therapies.   The goal for the average BP at rest is under 130/80. Call or return to clinic if not at the goal .     (M81.0) Osteoporosis without current pathological fracture, unspecified osteoporosis type  Comment:   Plan: alendronate (FOSAMAX) 70 MG tablet        Use the daily calcium intake of 1500 mg. Use the fall precautions. There is a DEXA xray every 3 years.     (G47.00) Insomnia, unspecified type  Comment:   Plan: traZODone (DESYREL) 100 MG tablet        Consider the lowest effective dose of the med, either 25 mg or 50 mg or 75 mg or the max of 100 mg daily,   Before bed. Move the dose back so as not to be drowsy in the am.     (E87.6) Hypokalemia  Comment:   Plan: potassium chloride ER (MICRO-K) 10 MEQ CR         capsule        Take this as directed.     (Z00.00) Healthcare  maintenance  Comment:   Plan: Glucose, Vitamin B12        Do these today and we will call the results.

## 2019-03-04 NOTE — TELEPHONE ENCOUNTER
Patients daughter is calling us back and message was given, but she thought Dr. Wren told them the Trazodone 100 mg was to high of a dose. Please advise.  Evelyn Villa  [facility] [place] [facility]

## 2019-03-08 ENCOUNTER — PATIENT OUTREACH (OUTPATIENT)
Dept: CARE COORDINATION | Facility: CLINIC | Age: 84
End: 2019-03-08

## 2019-03-12 PROBLEM — F32.1 MODERATE MAJOR DEPRESSION (H): Status: ACTIVE | Noted: 2019-03-12

## 2019-03-12 PROBLEM — I10 ESSENTIAL HYPERTENSION: Status: ACTIVE | Noted: 2019-03-12

## 2019-03-22 ENCOUNTER — PATIENT OUTREACH (OUTPATIENT)
Dept: CARE COORDINATION | Facility: CLINIC | Age: 84
End: 2019-03-22

## 2019-03-22 NOTE — PROGRESS NOTES
Clinic Care Coordination Contact    Situation: Patient chart reviewed by care coordinator.    Background: Patient is receiving home care services through [facility].    Assessment: Reviewed home care notes patient has had meeting with  in the home.  Daughter was present at this meeting.   reviewed options and is contacting [facility] contacted  for updates.   is also looking into some resources for patient.    Plan/Recommendations: Clinic care coordination  will review chart in about 3 weeks.    ALESSANDRA Keith, Clinic Care Coordinator 3/22/2019   12:54 PM  [phone]

## 2019-04-15 ENCOUNTER — PATIENT OUTREACH (OUTPATIENT)
Dept: CARE COORDINATION | Facility: CLINIC | Age: 84
End: 2019-04-15

## 2019-04-15 NOTE — LETTER
[facility] COORDIN[facility] - [facility]  [address] [place] [address]  Ph:  [phone]    April 15, 2019    Nimo Concepcion  [address] [place] [phone]      Dear Nimo,    I am a clinic care coordinator who works with Devaughn Wren MD at [facility]. I wanted to thank you for spending the time to talk with me.  I wanted to introduce myself and provide you with my contact information so that you can call me with questions or concerns about your health care. Below is a description of clinic care coordination and how I can further assist you.     The clinic care coordinator is a registered nurse and/or  who understand the health care system. The goal of clinic care coordination is to help you manage your health and improve access to the [facility] system in the most efficient manner. The registered nurse can assist you in meeting your health care goals by providing education, coordinating services, and strengthening the communication among your providers. The  can assist you with financial, behavioral, psychosocial, chemical dependency, counseling, and/or psychiatric resources.    Please feel free to contact me at [phone], with any questions or concerns. We at [facility] are focused on providing you with the highest-quality healthcare experience possible and that all starts with you.     Sincerely,     ALESSANDRA Keith  [facility] - Care Coordination  [facility]   [phone]

## 2019-04-15 NOTE — PROGRESS NOTES
Clinic Care Coordination Contact    Clinic Care Coordination Contact  OUTREACH    Referral Information:  Referral Source: PCP    Primary Diagnosis: Psychosocial    Chief Complaint   Patient presents with     Clinic Care Coordination - Follow-up             Universal Utilization: No concerns  Clinic Utilization  Difficulty keeping appointments:: No  Compliance Concerns: No  No-Show Concerns: No  No PCP office visit in Past Year: No  Utilization    Last refreshed: 4/12/2019  3:45 PM:  Hospital Admissions 1           Last refreshed: 4/12/2019  3:45 PM:  ED Visits 2           Last refreshed: 3/22/2019  5:03 PM:  No Show Count (past year) 0              Current as of: 3/22/2019  5:03 PM              Clinical Concerns:  Current Medical Concerns: Per patient she has no concerns.  Current Behavioral Concerns: Per patient she is doing okay.  She is declining taking any medications for her depression.  When asked what is she doing to improve it and she just said she is trying to keep a positive attitude.  Education Provided to patient: Encourage patient to have good follow-up if her depression increases.  Pain  Pain (GOAL):: No  Health Maintenance Reviewed: Due/Overdue   Health Maintenance Due   Topic Date Due     DEPRESSION ACTION PLAN  04/11/1948     MEDICARE ANNUAL WELLNESS VISIT  04/11/1995     ZOSTER IMMUNIZATION (2 of 3) 02/13/2014       Clinical Pathway: None    Medication Management:  Patient and daughter manage her medications.    Functional Status:  Dependent ADLs:: (stand by assistance)  Dependent IADLs:: Shopping, Medication Management, Transportation, Cleaning, Cooking, Laundry  Bed or wheelchair confined:: No  Mobility Status: Independent w/Device  Fallen 2 or more times in the past year?: No  Any fall with injury in the past year?: No    Living Situation:  Current living arrangement:: I live in a private home  Type of residence:: Private home - stairs    Diet/Exercise/Sleep:  Diet:: Regular  Inadequate  nutrition (GOAL):: No  Food Insecurity: No  Tube Feeding: No  Exercise:: Currently not exercising    Transportation:  Transportation concerns (GOAL):: No  Transportation means:: Family     Psychosocial:  Mental health DX:: Yes  Mental health DX how managed:: None(not started wellbutrin )  Mental health management concern (GOAL):: No  Informal Support system:: Children, Neighbors     Financial/Insurance:   Financial/Insurance concerns (GOAL):: Yes  Patient had home care for a while and this has now ended.  She is still living in her home and daughter is still working on her patient moving to the [employer] into the same building she is living in.  Patient still is not happy with this plan.  She was not open to discussing options.     Resources and Interventions:  Current Resources:   List of home care services:: Skilled Nursing, Physicial Therapy, Occupational Therapy;   Community Resources: Home Care  Supplies used at home:: Incontinence Supplies  Equipment Currently Used at Home: walker, rolling, raised toilet, shower chair    Advance Care Plan/Directive  Advanced Care Plans/Directives on file:: No(at [facility])    Referrals Placed: None     Goals:         Patient/Caregiver understanding: n/a    Outreach Frequency: monthly      Plan:  to resend out introduction letter to patient.   to call in about 1 month.  Patient to continue to manage her depression and follow-up with provider if increases.    ALESSANDRA Keith, [facility] - Care Coordinator   [facility]  4/15/2019   2:40 PM  [phone]

## 2019-05-01 ENCOUNTER — OFFICE VISIT (OUTPATIENT)
Dept: FAMILY MEDICINE | Facility: CLINIC | Age: 84
End: 2019-05-01
Payer: COMMERCIAL

## 2019-05-01 VITALS
BODY MASS INDEX: 26.12 KG/M2 | HEART RATE: 83 BPM | OXYGEN SATURATION: 97 % | DIASTOLIC BLOOD PRESSURE: 72 MMHG | SYSTOLIC BLOOD PRESSURE: 124 MMHG | TEMPERATURE: 97 F | HEIGHT: 64 IN | RESPIRATION RATE: 18 BRPM | WEIGHT: 153 LBS

## 2019-05-01 DIAGNOSIS — F32.1 MODERATE MAJOR DEPRESSION (H): ICD-10-CM

## 2019-05-01 DIAGNOSIS — I10 ESSENTIAL HYPERTENSION: Primary | ICD-10-CM

## 2019-05-01 DIAGNOSIS — G47.00 INSOMNIA, UNSPECIFIED TYPE: ICD-10-CM

## 2019-05-01 DIAGNOSIS — R23.2 HOT FLASHES: ICD-10-CM

## 2019-05-01 DIAGNOSIS — Z13.29 SCREENING FOR THYROID DISORDER: ICD-10-CM

## 2019-05-01 LAB
T4 FREE SERPL-MCNC: 1.15 NG/DL (ref 0.76–1.46)
TSH SERPL DL<=0.005 MIU/L-ACNC: 1.04 MU/L (ref 0.4–4)

## 2019-05-01 PROCEDURE — 84443 ASSAY THYROID STIM HORMONE: CPT | Performed by: FAMILY MEDICINE

## 2019-05-01 PROCEDURE — 36415 COLL VENOUS BLD VENIPUNCTURE: CPT | Performed by: FAMILY MEDICINE

## 2019-05-01 PROCEDURE — 84439 ASSAY OF FREE THYROXINE: CPT | Performed by: FAMILY MEDICINE

## 2019-05-01 PROCEDURE — 99214 OFFICE O/P EST MOD 30 MIN: CPT | Performed by: FAMILY MEDICINE

## 2019-05-01 RX ORDER — CITALOPRAM HYDROBROMIDE 20 MG/1
TABLET ORAL
Qty: 45 TABLET | Refills: 11 | Status: SHIPPED | OUTPATIENT
Start: 2019-05-01 | End: 2022-04-25

## 2019-05-01 ASSESSMENT — MIFFLIN-ST. JEOR: SCORE: 1104

## 2019-05-01 NOTE — PATIENT INSTRUCTIONS
(F32.1) Moderate major depression (H)  Comment:   Plan: citalopram (CELEXA) 20 MG tablet        For the depression we discussed the Celexa dose and you are taking 20 mg daily now. We will raise that to 30 mg daily by using 1.5 pills daily of the 20 mg size.   This will take about 3-4 weeks to work and if doing well then refills are available. Use the non drug therapies.   If not better then recheck in the clinic in June.     (I10) Essential hypertension   Comment:   Plan: For the Blood pressure check the machine now and annually. The goal for the average BP at rest is under 130/80.   Use the med and the non drug therapies. If doing well then refill and recheck until next spring.     (G47.00) Insomnia, unspecified type  Comment:   Plan:   For the sleep avoid caffeine and other stimulants. Trazodone can be used, but if the dose increase in Celexa is effective then you may not need the Trazodone.   Trazodone does cause constipation.

## 2019-05-01 NOTE — LETTER
My Depression Action Plan  Name: Nimo Concepcion   Date of Birth 4/11/1930  Date: 5/1/2019    My doctor: Devaughn Wren   My clinic: [facility]  [address], [place] [address]  [phone]          GREEN    ZONE   Good Control    What it looks like:     Things are going generally well. You have normal up s and down s. You may even feel depressed from time to time, but bad moods usually last less than a day.   What you need to do:  1. Continue to care for yourself (see self care plan)  2. Check your depression survival kit and update it as needed  3. Follow your physician s recommendations including any medication.  4. Do not stop taking medication unless you consult with your physician first.           YELLOW         ZONE Getting Worse    What it looks like:     Depression is starting to interfere with your life.     It may be hard to get out of bed; you may be starting to isolate yourself from others.    Symptoms of depression are starting to last most all day and this has happened for several days.     You may have suicidal thoughts but they are not constant.   What you need to do:     1. Call your care team, your response to treatment will improve if you keep your care team informed of your progress. Yellow periods are signs an adjustment may need to be made.     2. Continue your self-care, even if you have to fake it!    3. Talk to someone in your support network    4. Open up your depression survival kit           RED    ZONE Medical Alert - Get Help    What it looks like:     Depression is seriously interfering with your life.     You may experience these or other symptoms: You can t get out of bed most days, can t work or engage in other necessary activities, you have trouble taking care of basic hygiene, or basic responsibilities, thoughts of suicide or death that will not go away, self-injurious behavior.     What you need to do:  1. Call  your care team and request a same-day appointment. If they are not available (weekends or after hours) call your local crisis line, emergency room or 911.            Depression Self Care Plan / Survival Kit    Self-Care for Depression  Here s the deal. Your body and mind are really not as separate as most people think.  What you do and think affects how you feel and how you feel influences what you do and think. This means if you do things that people who feel good do, it will help you feel better.  Sometimes this is all it takes.  There is also a place for medication and therapy depending on how severe your depression is, so be sure to consult with your medical provider and/ or Behavioral Health Consultant if your symptoms are worsening or not improving.     In order to better manage my stress, I will:    Exercise  Get some form of exercise, every day. This will help reduce pain and release endorphins, the  feel good  chemicals in your brain. This is almost as good as taking antidepressants!  This is not the same as joining a gym and then never going! (they count on that by the way ) It can be as simple as just going for a walk or doing some gardening, anything that will get you moving.      Hygiene   Maintain good hygiene (Get out of bed in the morning, Make your bed, Brush your teeth, Take a shower, and Get dressed like you were going to work, even if you are unemployed).  If your clothes don't fit try to get ones that do.    Diet  I will strive to eat foods that are good for me, drink plenty of water, and avoid excessive sugar, caffeine, alcohol, and other mood-altering substances.  Some foods that are helpful in depression are: complex carbohydrates, B vitamins, flaxseed, fish or fish oil, fresh fruits and vegetables.    Psychotherapy  I agree to participate in Individual Therapy (if recommended).    Medication  If prescribed medications, I agree to take them.  Missing doses can result in serious side effects.   I understand that drinking alcohol, or other illicit drug use, may cause potential side effects.  I will not stop my medication abruptly without first discussing it with my provider.    Staying Connected With Others  I will stay in touch with my friends, family members, and my primary care provider/team.    Use your imagination  Be creative.  We all have a creative side; it doesn t matter if it s oil painting, sand castles, or mud pies! This will also kick up the endorphins.    Witness Beauty  (AKA stop and smell the roses) Take a look outside, even in mid-winter. Notice colors, textures. Watch the squirrels and birds.     Service to others  Be of service to others.  There is always someone else in need.  By helping others we can  get out of ourselves  and remember the really important things.  This also provides opportunities for practicing all the other parts of the program.    Humor  Laugh and be silly!  Adjust your TV habits for less news and crime-drama and more comedy.    Control your stress  Try breathing deep, massage therapy, biofeedback, and meditation. Find time to relax each day.     My support system    Clinic Contact:  Phone number:    Contact 1:  Phone number:    Contact 2:  Phone number:    [employer]/:  Phone number:    Therapist:  Phone number:    [facility] crisis center:    Phone number:    Other community support:  Phone number:

## 2019-05-01 NOTE — PROGRESS NOTES
"  SUBJECTIVE:   Nimo Concepcion is a 89 year old female who presents to clinic today for the following   health issues:      Hypertension Follow-up      Outpatient blood pressures are being checked at home. Daughter states blood pressures have been different all of the time or\" high normal\".    Low Salt Diet: no added salt    Depression Followup    Status since last visit: currently taking 20mg of Celexa. Still feeling depressed. Would like to increase medication.     See PHQ-9 for current symptoms.  Other associated symptoms: not sleeping well     Complicating factors:   Significant life event:  No   Current substance abuse:  None  Anxiety or Panic symptoms:  Yes     PHQ 3/4/2019   PHQ-9 Total Score 5   Q9: Thoughts of better off dead/self-harm past 2 weeks Not at all     She has insomnia and uses Trazodone at bedtime.     PHQ-9  English  PHQ-9   Any Language  Suicide Assessment Five-step Evaluation and Treatment (SAFE-T     RESPIRATORY SYMPTOMS      Duration: 1 month     Description  Cough, taking deep breaths a lot     Severity: moderate    Accompanying signs and symptoms: None    History (predisposing factors):  none    Precipitating or alleviating factors: worse with laying down     Therapies tried and outcome:  none         Current Outpatient Medications:      alendronate (FOSAMAX) 70 MG tablet, Take 1 tablet (70 mg) by mouth every 7 days, Disp: 13 tablet, Rfl: 3     citalopram (CELEXA) 40 MG tablet, Take 1 tablet (40 mg) by mouth daily (Patient taking differently: Take 40 mg by mouth daily Taking 20mg), Disp: 30 tablet, Rfl: 11     Cranberry 500 MG TABS, Take 1 tablet by mouth daily, Disp: , Rfl:      Cyanocobalamin (VITAMIN B-12 PO), Take 1 capsule by mouth daily, Disp: , Rfl:      omeprazole (PRILOSEC) 40 MG DR capsule, Take 1 capsule (40 mg) by mouth daily, Disp: 30 capsule, Rfl: 11     potassium chloride ER (MICRO-K) 10 MEQ CR capsule, Take 3 capsules (30 mEq) by mouth daily, Disp: 90 capsule, Rfl: 11    " " ranitidine (ZANTAC) 75 MG tablet, Take 1 tablet (75 mg) by mouth daily, Disp: 30 tablet, Rfl: 11     traZODone (DESYREL) 100 MG tablet, Take 1 tablet (100 mg) by mouth At Bedtime, Disp: 30 tablet, Rfl: 11     verapamil ER (VERELAN) 180 MG 24 hr capsule, Take 1 capsule (180 mg) by mouth daily, Disp: 30 capsule, Rfl: 11      Patient Active Problem List   Diagnosis     Fall at home     Fall     Hypothyroidism, unspecified type     Adenocarcinoma of breast (H)     Chronic reflux esophagitis     DISH (diffuse idiopathic skeletal hyperostosis)     Lightheadedness     Moderate recurrent major depression (H)     Osteoporosis     Other malaise and fatigue     Pain in joint, shoulder region     Primary hypercholesterolemia     Primary insomnia     Primary localized osteoarthrosis of shoulder region     Sciatica     Vitamin D deficiency     UTI (urinary tract infection)     CAP (community acquired pneumonia)     Essential hypertension     Moderate major depression (H)       Blood pressure 124/72, pulse 83, temperature 97  F (36.1  C), temperature source Tympanic, resp. rate 18, height 1.626 m (5' 4\"), weight 69.4 kg (153 lb), SpO2 97 %.    Exam:  GENERAL APPEARANCE: healthy, alert and no distress  EYES: EOMI,  PERRL  HENT: ear canals and TM's normal and nose and mouth without ulcers or lesions  NECK: no adenopathy, no asymmetry, masses, or scars and thyroid normal to palpation  RESP: lungs clear to auscultation - no rales, rhonchi or wheezes  CV: regular rates and rhythm, normal S1 S2, no S3 or S4 and no murmur, click or rub -  PSYCH: mentation appears normal and affect normal/bright      (F32.1) Moderate major depression (H)  Comment:   Plan: citalopram (CELEXA) 20 MG tablet        For the depression we discussed the Celexa dose and you are taking 20 mg daily now. We will raise that to 30 mg daily by using 1.5 pills daily of the 20 mg size.   This will take about 3-4 weeks to work and if doing well then refills are " available. Use the non drug therapies.   If not better then recheck in the clinic in June.     (I10) Essential hypertension   Comment:   Plan: For the Blood pressure check the machine now and annually. The goal for the average BP at rest is under 130/80.   Use the med and the non drug therapies. If doing well then refill and recheck until next spring.     (G47.00) Insomnia, unspecified type  Comment:   Plan:   For the sleep avoid caffeine and other stimulants. Trazodone can be used, but if the dose increase in Celexa is effective then you may not need the Trazodone.   Trazodone does cause constipation.       Devaughn Wren

## 2019-05-01 NOTE — LETTER
May 2, 2019      Nimo Concepcion  [address] [place] [phone]        Dear ,    We are writing to inform you of your test results.    Your test results fall within the expected range/Normal.    Resulted Orders   TSH   Result Value Ref Range    TSH 1.04 0.40 - 4.00 mU/L   T4 FREE   Result Value Ref Range    T4 Free 1.15 0.76 - 1.46 ng/dL       If you have any questions or concerns, please call the clinic at the number listed above.       Sincerely,        Devaughn Wren MD

## 2019-05-15 ENCOUNTER — PATIENT OUTREACH (OUTPATIENT)
Dept: CARE COORDINATION | Facility: CLINIC | Age: 84
End: 2019-05-15

## 2019-05-15 NOTE — PROGRESS NOTES
Clinic Care Coordination Contact    Clinic Care Coordination Contact  OUTREACH    Referral Information:  Referral Source: PCP    Primary Diagnosis: Psychosocial    Chief Complaint   Patient presents with     Clinic Care Coordination - Follow-up             Universal Utilization: no concerns  Clinic Utilization  Difficulty keeping appointments:: No  Compliance Concerns: No  No-Show Concerns: No  No PCP office visit in Past Year: No  Utilization    Last refreshed: 5/14/2019 10:38 PM:  Hospital Admissions 1           Last refreshed: 5/14/2019 10:38 PM:  ED Visits 2           Last refreshed: 5/14/2019 10:38 PM:  No Show Count (past year) 0              Current as of: 5/14/2019 10:38 PM              Clinical Concerns:  Current Medical Concerns:  Pt denied any concerns.     Current Behavioral Concerns: pt reports increased depression.  She does not want to talk with Sw about her depression.    Education Provided to patient: Encouraged patient to follow-up with her provider if she does not start having decreased depression.  Patient was unwilling to talk about any of her symptoms or her depression it said she knew what was causing it.  Attempted to discuss counseling as an option and patient again was not willing to discuss.  Pain  Pain (GOAL):: No  Health Maintenance Reviewed: Due/Overdue   Health Maintenance Due   Topic Date Due     MEDICARE ANNUAL WELLNESS VISIT  04/11/1995     ZOSTER IMMUNIZATION (2 of 3) 02/13/2014       Clinical Pathway: None    Medication Management:  Daughter sets up patient's medications.  Uncertain how much patient understands about her medications as last phone call she said she was not taking any antidepressant or medications for her depression yet she was.    Functional Status:  Dependent ADLs:: (stand by assistance)  Dependent IADLs:: Shopping, Medication Management, Transportation, Cleaning, Cooking, Laundry  Bed or wheelchair confined:: No  Mobility Status: Independent w/Device  Fallen 2  or more times in the past year?: No  Any fall with injury in the past year?: No    Living Situation:  Current living arrangement:: I live in a private home  Type of residence:: Private home - stairs    Diet/Exercise/Sleep:  Diet:: Regular  Inadequate nutrition (GOAL):: No  Food Insecurity: No  Tube Feeding: No  Exercise:: Currently not exercising    Transportation:  Transportation concerns (GOAL):: No  Transportation means:: Family     Psychosocial:  Mental health DX:: Yes  Mental health DX how managed:: None(not started wellbutrin )  Mental health management concern (GOAL):: No  Informal Support system:: Children, Neighbors     Financial/Insurance:   Financial/Insurance concerns (GOAL):: Yes  Patient noted that the plan is still to move to the same apartment building as her daughter at this does not appear to be progressing very quickly.  Patient still appears to not want this changed to occur.     Resources and Interventions:  Current Resources:   List of home care services:: Skilled Nursing, Physicial Therapy, Occupational Therapy;   Community Resources: Home Care  Supplies used at home:: Incontinence Supplies  Equipment Currently Used at Home: walker, rolling, raised toilet, shower chair    Advance Care Plan/Directive  Advanced Care Plans/Directives on file:: No(at [facility])    Referrals Placed: None     Goals:         Patient/Caregiver understanding: n/a   Outreach Frequency: monthly      Plan: patient to follow-up with provider if her depression does not start to decrease or if she is looking into counseling.   will call in 4-5 business weeks.    ALESSANDRA Keith, [facility] - Care Coordinator   [facility]  5/15/2019   1:25 PM  [phone]

## 2019-05-28 ENCOUNTER — TELEPHONE (OUTPATIENT)
Dept: FAMILY MEDICINE | Facility: CLINIC | Age: 84
End: 2019-05-28

## 2019-05-28 ENCOUNTER — ALLIED HEALTH/NURSE VISIT (OUTPATIENT)
Dept: FAMILY MEDICINE | Facility: CLINIC | Age: 84
End: 2019-05-28
Payer: COMMERCIAL

## 2019-05-28 DIAGNOSIS — F32.1 MODERATE MAJOR DEPRESSION (H): ICD-10-CM

## 2019-05-28 DIAGNOSIS — I10 ESSENTIAL HYPERTENSION: Primary | ICD-10-CM

## 2019-05-28 DIAGNOSIS — K21.00 CHRONIC REFLUX ESOPHAGITIS: ICD-10-CM

## 2019-05-28 PROCEDURE — 99207 ZZC NO CHARGE NURSE ONLY: CPT

## 2019-05-28 NOTE — TELEPHONE ENCOUNTER
Dr. Wren,    Daughter of the patient comes in to ask questions about her mother's medications.      1.  Patient was told to stop omeprazole and has stopped omeprazole is taking zantac.  Can we discontinue this from her medication list?    2.  Patient was never on celexa 20 mg.  Patient has always been on celexa 40 mg.  Please review the last clinic notes about increasing her dose to 30 mg?    3.  Patient would like a prescription for Ocuvite 50+.  I have que'd for your consideration.    Daughter states mom LIZZETH, please get permission to speak to her daughter.  Will do the consent to communicate form next time into office. Cata SPICER RN

## 2019-05-29 RX ORDER — MV-MN/OM3/DHA/EPA/FISH/LUT/ZEA 250-5-1 MG
CAPSULE ORAL
Qty: 30 CAPSULE | Refills: 11 | Status: SHIPPED | OUTPATIENT
Start: 2019-05-29

## 2019-05-29 NOTE — TELEPHONE ENCOUNTER
Dr. Wren,    The patient and the daughter are called.  The daughter has all the discharge paperwork and AVS from last fall.  No where can she find Celexa prescribed for her mother.  The daughter has gone through her mothers actually medication bottles and there are 2 for celexa both prescribed from Dr. Wren.  One is celexa 40 mg 1 daily the other prescribed as celexa 20 mg 1 1/2 daily.  The daughter states we should call [facility]  ([phone]) as they have the [facility] records where her mother got her prescriptions.  I have done this and their records go back to 9/2017 and Celexa has never been prescribed for the patient.  I have also called [facility] in [place].  The patient has 3 Rx's for celexa there.  3/5/19 Celexa 40 mg 1 daily prescribed by Dr. Wren.  4/19/19 Celexa 40 mg 1 daily Dr. Wren. 5/1/19 Celexa 20 mg 1 1/2 daily Dr. Wren.  I have informed the daughter of the ocuvite being prescribed for the patient.  I have discontinued the Omeprazole from the patient's medication list. I have gone through the encounters from discharge from [facility] 1/30/19 no notation of celexa being on medication list.  Cata SPICER RN

## 2019-05-29 NOTE — TELEPHONE ENCOUNTER
This is complicated. There are many phone notes. She is a new pt to me. She was given Wellbutrin on 2-6-19 at another clinic and did not take it at my first visit in March. Somewhere between March and May the dose of 20 mg Celexa was increased to 40 mg daily but I cannot see this. Our clinic RN needs to go through all the notes and call the daughter to sort this out. .Devaughn Wren

## 2019-05-30 NOTE — TELEPHONE ENCOUNTER
Thanks to Zonia for going through this. My recommendation is that she take 30 mg daily of the Celexa, as that was the last dose ordered.     Devaughn Wren

## 2019-05-31 NOTE — TELEPHONE ENCOUNTER
Patient is contacted and she has asked me to call her daughter Lesli.  I have called Lesli and told her of Dr. Wren recommendation of Celexa 20 mg take 1 1/2 tabs a day (30 mg).  Strongly suggest she get rid of the bottle of celexa 40 mg as to not get this confused.  Cata SPICER RN

## 2019-06-09 ENCOUNTER — HOSPITAL ENCOUNTER (EMERGENCY)
Facility: CLINIC | Age: 84
Discharge: HOME | End: 2019-06-09
Attending: FAMILY MEDICINE | Admitting: FAMILY MEDICINE
Payer: COMMERCIAL

## 2019-06-09 VITALS
RESPIRATION RATE: 18 BRPM | SYSTOLIC BLOOD PRESSURE: 144 MMHG | DIASTOLIC BLOOD PRESSURE: 72 MMHG | OXYGEN SATURATION: 95 % | HEART RATE: 75 BPM | TEMPERATURE: 99.5 F

## 2019-06-09 DIAGNOSIS — N39.0 URINARY TRACT INFECTION WITH HEMATURIA, SITE UNSPECIFIED: ICD-10-CM

## 2019-06-09 DIAGNOSIS — R31.9 URINARY TRACT INFECTION WITH HEMATURIA, SITE UNSPECIFIED: ICD-10-CM

## 2019-06-09 LAB
ALBUMIN UR-MCNC: 30 MG/DL
APPEARANCE UR: ABNORMAL
BILIRUB UR QL STRIP: NEGATIVE
CAOX CRY #/AREA URNS HPF: ABNORMAL /HPF
COLOR UR AUTO: YELLOW
GLUCOSE UR STRIP-MCNC: NEGATIVE MG/DL
HGB UR QL STRIP: ABNORMAL
KETONES UR STRIP-MCNC: 5 MG/DL
LEUKOCYTE ESTERASE UR QL STRIP: ABNORMAL
MUCOUS THREADS #/AREA URNS LPF: PRESENT /LPF
NITRATE UR QL: NEGATIVE
PH UR STRIP: 5 PH (ref 5–7)
RBC #/AREA URNS AUTO: >182 /HPF (ref 0–2)
SOURCE: ABNORMAL
SP GR UR STRIP: 1.02 (ref 1–1.03)
SQUAMOUS #/AREA URNS AUTO: <1 /HPF (ref 0–1)
UROBILINOGEN UR STRIP-MCNC: 0 MG/DL (ref 0–2)
WBC #/AREA URNS AUTO: 78 /HPF (ref 0–5)

## 2019-06-09 PROCEDURE — 81001 URINALYSIS AUTO W/SCOPE: CPT | Performed by: FAMILY MEDICINE

## 2019-06-09 PROCEDURE — 99283 EMERGENCY DEPT VISIT LOW MDM: CPT | Performed by: FAMILY MEDICINE

## 2019-06-09 PROCEDURE — 99284 EMERGENCY DEPT VISIT MOD MDM: CPT | Mod: Z6 | Performed by: FAMILY MEDICINE

## 2019-06-09 PROCEDURE — 87086 URINE CULTURE/COLONY COUNT: CPT | Performed by: FAMILY MEDICINE

## 2019-06-09 RX ORDER — CEPHALEXIN 500 MG/1
500 CAPSULE ORAL 3 TIMES DAILY
Qty: 21 CAPSULE | Refills: 0 | Status: SHIPPED | OUTPATIENT
Start: 2019-06-09 | End: 2019-06-16

## 2019-06-09 ASSESSMENT — ENCOUNTER SYMPTOMS
CHILLS: 0
DIAPHORESIS: 0
PALPITATIONS: 0
WHEEZING: 0
SINUS PRESSURE: 0
BLOOD IN STOOL: 0
ABDOMINAL PAIN: 1
SHORTNESS OF BREATH: 0
NAUSEA: 0
DIFFICULTY URINATING: 1
HEADACHES: 0
FEVER: 0
DIARRHEA: 0
SORE THROAT: 0
VOMITING: 0
APPETITE CHANGE: 1
CONSTIPATION: 0
COUGH: 0
FREQUENCY: 1
DYSURIA: 0

## 2019-06-09 NOTE — DISCHARGE INSTRUCTIONS
ICD-10-CM    1. Urinary tract infection with hematuria, site unspecified N39.0     R31.9     Take keflex three times daily for 7 days. await urine culture. return for fever, worsening.

## 2019-06-09 NOTE — ED PROVIDER NOTES
History     Chief Complaint   Patient presents with     Rule out Urinary Tract Infection     feels urgency but unable to void      HPI  Nimo Concepcion is a 89 year old female who presents with a history of UTI and secondary falls in October 2019 requiring hospitalization, adenocarcinoma of the breast, hypothyroidism -presents with a couple of days of small-volume voids and dysuria, urgency, frequency, possible hematuria with minimal back pain and no associated fever.  Decreased appetite.  Some left lower quadrant abdominal discomfort periodically it is been attributed to constipation which is been long-standing for her.  She has no vomiting or nausea.  No other systemic symptoms.    Allergies:  No Known Allergies    Problem List:    Patient Active Problem List    Diagnosis Date Noted     Essential hypertension 03/12/2019     Priority: Medium     Moderate major depression (H) 03/12/2019     Priority: Medium     Adenocarcinoma of breast (H) 01/16/2019     Priority: Medium     Chronic reflux esophagitis 01/16/2019     Priority: Medium     Lightheadedness 01/16/2019     Priority: Medium     Moderate recurrent major depression (H) 01/16/2019     Priority: Medium     Osteoporosis 01/16/2019     Priority: Medium     Other malaise and fatigue 01/16/2019     Priority: Medium     Pain in joint, shoulder region 01/16/2019     Priority: Medium     Primary hypercholesterolemia 01/16/2019     Priority: Medium     Primary insomnia 01/16/2019     Priority: Medium     Primary localized osteoarthrosis of shoulder region 01/16/2019     Priority: Medium     Sciatica 01/16/2019     Priority: Medium     Vitamin D deficiency 01/16/2019     Priority: Medium     UTI (urinary tract infection) 01/16/2019     Priority: Medium     CAP (community acquired pneumonia) 01/16/2019     Priority: Medium     Hypothyroidism, unspecified type 12/03/2018     Priority: Medium     DISH (diffuse idiopathic skeletal hyperostosis) 06/07/2018     Priority:  Medium     Fall at home 05/21/2014     Priority: Medium     Fall 05/21/2014     Priority: Medium        Past Medical History:    No past medical history on file.    Past Surgical History:    No past surgical history on file.    Family History:    No family history on file.    Social History:  Marital Status:   [5]  Social History     Tobacco Use     Smoking status: Never Smoker     Smokeless tobacco: Never Used   Substance Use Topics     Alcohol use: No     Drug use: No        Medications:      alendronate (FOSAMAX) 70 MG tablet   citalopram (CELEXA) 20 MG tablet   Cranberry 500 MG TABS   Cyanocobalamin (VITAMIN B-12 PO)   Multiple Vitamins-Minerals (OCUVITE ADULT 50+) CAPS   potassium chloride ER (MICRO-K) 10 MEQ CR capsule   ranitidine (ZANTAC) 75 MG tablet   traZODone (DESYREL) 100 MG tablet   verapamil ER (VERELAN) 180 MG 24 hr capsule         Review of Systems   Constitutional: Positive for appetite change. Negative for chills, diaphoresis and fever.   HENT: Negative for ear pain, sinus pressure and sore throat.    Eyes: Negative for visual disturbance.   Respiratory: Negative for cough, shortness of breath and wheezing.    Cardiovascular: Negative for chest pain and palpitations.   Gastrointestinal: Positive for abdominal pain. Negative for blood in stool, constipation, diarrhea, nausea and vomiting.   Genitourinary: Positive for decreased urine volume, difficulty urinating, frequency and urgency. Negative for dysuria.   Skin: Negative for rash.   Neurological: Negative for headaches.   All other systems reviewed and are negative.        Physical Exam   BP: (!) 147/7  Pulse: 75  Temp: 99.5  F (37.5  C)  Resp: 18  SpO2: 95 %      Physical Exam   Constitutional: No distress.   HENT:   Mouth/Throat: Oropharynx is clear and moist.   Eyes: Conjunctivae are normal.   Neck: Neck supple.   Cardiovascular: Normal rate and regular rhythm. Exam reveals no friction rub.   No murmur heard.  Pulmonary/Chest: Effort  normal and breath sounds normal. No stridor. No respiratory distress. She has no wheezes.   Abdominal: Soft. Bowel sounds are normal. She exhibits no distension and no mass. There is tenderness in the left lower quadrant. There is no guarding.   Musculoskeletal: She exhibits no edema.   Neurological: She is alert. She exhibits normal muscle tone.   Skin: No rash noted. She is not diaphoretic. No pallor.       ED Course        Procedures               Critical Care time:  none               Results for orders placed or performed during the hospital encounter of 06/09/19 (from the past 24 hour(s))   UA with Microscopic   Result Value Ref Range    Color Urine Yellow     Appearance Urine Slightly Cloudy     Glucose Urine Negative NEG^Negative mg/dL    Bilirubin Urine Negative NEG^Negative    Ketones Urine 5 (A) NEG^Negative mg/dL    Specific Gravity Urine 1.024 1.003 - 1.035    Blood Urine Large (A) NEG^Negative    pH Urine 5.0 5.0 - 7.0 pH    Protein Albumin Urine 30 (A) NEG^Negative mg/dL    Urobilinogen mg/dL 0.0 0.0 - 2.0 mg/dL    Nitrite Urine Negative NEG^Negative    Leukocyte Esterase Urine Moderate (A) NEG^Negative    Source Midstream Urine     WBC Urine 78 (H) 0 - 5 /HPF    RBC Urine >182 (H) 0 - 2 /HPF    Squamous Epithelial /HPF Urine <1 0 - 1 /HPF    Mucous Urine Present (A) NEG^Negative /LPF    Calcium Oxalate Many (A) NEG^Negative /HPF       Medications - No data to display    Assessments & Plan (with Medical Decision Making)     MDM: Nimo Concepcion is a 89 year old female presents with urinary tract infection symptoms without systemic symptoms associated and tolerating p.o.  Prior UTI in the fall that resulted in several falls.  She has findings of urinary tract infection on her testing here and urine culture sent.  Will use Keflex until urine culture returned.  Recommended follow-up in 48 hours given that she does have some mild left lower quadrant dull pain but I do think this is related to her  constipation history she otherwise has a benign abdomen.  Her comorbidities and this finding prompted a more close interval follow-up.  Noting that if her symptoms are improved she may wait until after UTI has been fully treated to do follow-up.  She is also given precautions for return.    I have reviewed the nursing notes.    I have reviewed the findings, diagnosis, plan and need for follow up with the patient.          Medication List      There are no discharge medications for this visit.         Final diagnoses:   Urinary tract infection with hematuria, site unspecified - Take keflex three times daily for 7 days. await urine culture. return for fever, worsening.       6/9/2019   [facility] EMERGENCY DEPARTMENT     Christopher Haley MD  06/09/19 [id number]

## 2019-06-09 NOTE — ED AVS SNAPSHOT
[facility] Emergency Department  [address] [address]  Phone:  [phone]  Fax:  [phone]                                    Nimo Concepcion   MRN: [id number]    Department:  [facility] Emergency Department   Date of Visit:  6/9/2019           After Visit Summary Signature Page    I have received my discharge instructions, and my questions have been answered. I have discussed any challenges I see with this plan with the nurse or doctor.    ..........................................................................................................................................  Patient/Patient Representative Signature      ..........................................................................................................................................  Patient Representative Print Name and Relationship to Patient    ..................................................               ................................................  Date                                   Time    ..........................................................................................................................................  Reviewed by Signature/Title    ...................................................              ..............................................  Date                                               Time          22EPIC Rev 08/18

## 2019-06-10 ENCOUNTER — TELEPHONE (OUTPATIENT)
Dept: EMERGENCY MEDICINE | Facility: CLINIC | Age: 84
End: 2019-06-10

## 2019-06-10 LAB
BACTERIA SPEC CULT: NO GROWTH
Lab: NORMAL
SPECIMEN SOURCE: NORMAL

## 2019-06-10 NOTE — TELEPHONE ENCOUNTER
"[facility] Emergency Department Lab result notification:    [place] ED lab result protocol used  Urine culture    Reason for call  Notify of lab results, assess symptoms,  review ED providers recommendations/discharge instructions (if necessary) and advise per ED lab result f/u protocol    Lab Result   Final urine culture report shows \"NO GROWTH\" and is NEGATIVE.  Emergency Dept discharge antibiotic: Cephalexin (Keflex) 500 mg capsule, 1 capsule (500 mg) by mouth 3 times daily for 7 days.  Is ED discharge Rx antibiotic for UTI only (Yes/No): Yes  Recommendations per [facility] ED Lab result protocol - Urine culture protocol.    Information table from ED Provider visit on 6/9/19  Symptoms reported at ED visit (Chief complaint, HPI) Chief Complaint   Patient presents with     Rule out Urinary Tract Infection       feels urgency but unable to void       HPI  Nimo Concepcion is a 89 year old female who presents with a history of UTI and secondary falls in October 2019 requiring hospitalization, adenocarcinoma of the breast, hypothyroidism -presents with a couple of days of small-volume voids and dysuria, urgency, frequency, possible hematuria with minimal back pain and no associated fever.  Decreased appetite.  Some left lower quadrant abdominal discomfort periodically it is been attributed to constipation which is been long-standing for her.  She has no vomiting or nausea.  No other systemic symptoms.   ED providers Impression and Plan (applicable information) 89 year old female presents with urinary tract infection symptoms without systemic symptoms associated and tolerating p.o.  Prior UTI in the fall that resulted in several falls.  She has findings of urinary tract infection on her testing here and urine culture sent.  Will use Keflex until urine culture returned.  Recommended follow-up in 48 hours given that she does have some mild left lower quadrant dull pain but I do think this is related to her " constipation history she otherwise has a benign abdomen.  Her comorbidities and this finding prompted a more close interval follow-up.  Noting that if her symptoms are improved she may wait until after UTI has been fully treated to do follow-up.  She is also given precautions for return.     Miscellaneous information NA     RN Assessment (Patient s current Symptoms), include time called.  [Insert Left message here if message left]  I'm about the same.  The pressure down there is a bit improved since I was seen in the ER.  RN Recommendations/Instructions per [place] ED lab result protocol  Notified of urine culture showing no growth.  Advised to discontinue antibiotic. Encouraged to f/u with her PCP as soon as possible   Please Contact your PCP clinic or return to the Emergency department if your:    Symptoms worsen or other concerning symptom's.    PCP follow-up Questions asked: YES       [RN Name]  Mason Boyce RN  [facility] RN  Lung Nodule and ED Lab Result RN  Epic pool (ED late result f/u RN): [id number]  [facility] INCIDENTAL RADIOLOGY F/U NURSES: P [id number]  # [phone]      Copy of Lab result   Urine Culture Aerobic Bacterial [[id number]] (Order [id number])   Exam Information     Exam Date Exam Time Accession # Results    6/9/19  9:34 AM [id number]    Specimen Information: Midstream Urine        Component Collected Lab   Specimen Description 06/09/2019  9:34    Midstream Urine    Special Requests 06/09/2019  9:34    Specimen received in preservative    Culture Micro 06/09/2019  9:34    No growth

## 2019-06-10 NOTE — RESULT ENCOUNTER NOTE
"Final urine culture report shows \"NO GROWTH\" and is NEGATIVE.  Emergency Dept discharge antibiotic: Cephalexin (Keflex) 500 mg capsule, 1 capsule (500 mg) by mouth 3 times daily for 7 days.  Is ED discharge Rx antibiotic for UTI only (Yes/No): Yes  Recommendations per [facility] ED Lab result protocol - Urine culture protocol."

## 2019-06-20 ENCOUNTER — PATIENT OUTREACH (OUTPATIENT)
Dept: CARE COORDINATION | Facility: CLINIC | Age: 84
End: 2019-06-20

## 2019-06-20 NOTE — PROGRESS NOTES
Clinic Care Coordination Contact  Outreach    Pt only chatted for a short time.  She was getting ready for a garage sale.  She will be moving shortly down to [place].  She will be in the same apartment building as her daughter.     Pt denied any concerns and said a call in about 3 weeks would be good.  She then politely ended the phone call before any assessment could be completed.     ALESSANDRA Keith, [facility] - Care Coordinator   [facility]  6/20/2019   10:04 AM  [phone]

## 2019-07-12 ENCOUNTER — PATIENT OUTREACH (OUTPATIENT)
Dept: CARE COORDINATION | Facility: CLINIC | Age: 84
End: 2019-07-12

## 2019-07-12 NOTE — PROGRESS NOTES
Clinic Care Coordination Contact  Outreach    Outreach completed and patient did not want any assistance with anything at this time.  She is moving around the 23rd of this month.  [place] will [place] down to the city to be closer to daughters home.    Patient wanted to wait until after she moves to work on any goalsetting or any other needs.    Plan: Patient to move as planned.   will call in about 1 month and either goals set or close to care coordination.    ALESSANDRA Keith, [place] - Care Coordinator   [facility]  7/12/2019   3:06 PM  [phone]

## 2019-08-16 ENCOUNTER — PATIENT OUTREACH (OUTPATIENT)
Dept: CARE COORDINATION | Facility: CLINIC | Age: 84
End: 2019-08-16

## 2019-08-16 NOTE — PROGRESS NOTES
Clinic Care Coordination Contact  [facility]       Clinical Data: Care Coordinator Outreach  Outreach attempted x 1, busy.    Plan:  Care Coordinator will try to reach patient again in 5-10 business days.    ALESSANDRA Keith, [facility] - Care Coordinator   [facility]  8/16/2019   10:36 AM  [phone]

## 2019-08-16 NOTE — LETTER
[facility]  [address] [place] [address]  Ph:  [phone]      8/23/2019      Nimo Concepcion  [address] [place] [address]      Dear  Nimo,      I have been attempting to reach you since our last contact. I would like to continue to work with you on the goals from our last conversation and provide the support you may need. I would appreciate if you would give me a call at [phone] and let me know if you would like to continue working together. I know that there are many things that can affect our ability to communicate and hope we can plan better moving forward.     All of us at [facility] are invested in your health and are here to assist you in meeting your goals.     Sincerely,      ALESSANDRA Keith  [facility] - Care Coordination  [facility]   [phone]

## 2019-10-04 NOTE — PROGRESS NOTES
Clinic Care Coordination Contact  [facility]/Voicemail    Referral Source: PCP  Clinical Data: Care Coordinator Outreach  Outreach attempted x 3, busy and unable to leave a message.   Plan: Care Coordinator sent unable to contact letter 8-23-19 via mail. Care Coordinator will do no further outreaches at this time.    ALESSANDRA Keith, [facility] - Care Coordinator   [facility]  10/4/2019   2:13 PM  [phone]

## 2020-01-21 NOTE — PROGRESS NOTES
Clinic Care Coordination Contact  [facility]/Voicemail    Referral Source: PCP  Clinical Data: Care Coordinator Outreach    Outreach attempted x 1.  Left message on pt's mother's voicemail with call back information and requested return call.    Plan: Care Coordinator will try to reach patient again in 3-5 business days.    ARIAS Palacios, [facility]  Clinic Care Coordinator  [facility]  [phone]  [email or web address]   Clinic Care Coordination Contact  [facility]       Clinical Data: Care Coordinator Outreach  Outreach attempted x 2, busy and unable to leave a message.    Plan: Care Coordinator to send unable to contact letter. Care Coordinator follow up in about 5 weeks.    ALESSANDRA Keith, [place] [facility] - Care Coordinator   [facility]  8/23/2019   10:26 AM  [phone]

## 2020-03-01 DIAGNOSIS — I10 ESSENTIAL HYPERTENSION: ICD-10-CM

## 2020-03-01 DIAGNOSIS — E87.6 HYPOKALEMIA: ICD-10-CM

## 2020-03-02 NOTE — TELEPHONE ENCOUNTER
"Requested Prescriptions   Pending Prescriptions Disp Refills     potassium chloride ER (MICRO-K) 10 MEQ CR capsule [Pharmacy Med Name: Potassium Chloride ER 10 MEQ Oral Capsule Extended Release]  Last Written Prescription Date:  3/4/19  Last Fill Quantity: 90,  # refills: 11   Last Office Visit with [facility], [facility] or [facility] prescribing provider:  5/1/19   Future Office Visit:       0     Sig: TAKE 3 CAPSULES BY MOUTH ONCE DAILY       Potassium Supplements Protocol Failed - 3/1/2020 12:44 PM        Failed - Normal serum potassium in past 12 months     Recent Labs   Lab Test 01/18/19  0522   POTASSIUM 4.0                    Passed - Recent (12 mo) or future (30 days) visit within the authorizing provider's department     Patient has had an office visit with the authorizing provider or a provider within the authorizing providers department within the previous 12 mos or has a future within next 30 days. See \"Patient Info\" tab in inbasket, or \"Choose Columns\" in Meds & Orders section of the refill encounter.              Passed - Medication is active on med list        Passed - Patient is age 18 or older        verapamil ER (VERELAN) 180 MG 24 hr capsule [Pharmacy Med Name: Verapamil HCl  MG Oral Capsule Extended Release 24 Hour]  Last Written Prescription Date:  3/4/19  Last Fill Quantity: 30,  # refills: 11   Last Office Visit with [facility] or [facility] prescribing provider:  5/1/19   Future Office Visit:       0     Sig: Take 1 capsule by mouth once daily       Calcium Channel Blockers Protocol  Failed - 3/1/2020 12:44 PM        Failed - Blood pressure under 140/90 in past 12 months     BP Readings from Last 3 Encounters:   06/09/19 144/72   05/01/19 124/72   03/04/19 152/80                 Failed - Normal ALT in past 12 months     Recent Labs   Lab Test 01/16/19  1137   ALT 12             Failed - Normal serum creatinine on file in past 12 months     Recent Labs   Lab Test 01/18/19  0522   CR 0.54             Passed " "- Recent (12 mo) or future (30 days) visit within the authorizing provider's specialty     Patient has had an office visit with the authorizing provider or a provider within the authorizing providers department within the previous 12 mos or has a future within next 30 days. See \"Patient Info\" tab in inbasket, or \"Choose Columns\" in Meds & Orders section of the refill encounter.              Passed - Medication is active on med list        Passed - Patient is age 18 or older        Passed - No active pregnancy on record        Passed - No positive pregnancy test in past 12 months          "

## 2020-03-03 NOTE — TELEPHONE ENCOUNTER
Routing refill request to provider for review/approval because:  Labs not current:  See below  No labs due per     Rosangela WHITMAN RN

## 2020-03-04 RX ORDER — VERAPAMIL HYDROCHLORIDE 180 MG/1
CAPSULE, EXTENDED RELEASE ORAL
Qty: 30 CAPSULE | Refills: 11 | Status: SHIPPED | OUTPATIENT
Start: 2020-03-04 | End: 2022-04-14

## 2020-03-04 RX ORDER — POTASSIUM CHLORIDE 750 MG/1
CAPSULE, EXTENDED RELEASE ORAL
Qty: 30 CAPSULE | Refills: 11 | Status: SHIPPED | OUTPATIENT
Start: 2020-03-04 | End: 2020-10-19

## 2020-03-05 ENCOUNTER — COMMUNICATION - HEALTHEAST (OUTPATIENT)
Dept: INTERNAL MEDICINE | Facility: CLINIC | Age: 85
End: 2020-03-05

## 2020-03-10 ENCOUNTER — OFFICE VISIT - HEALTHEAST (OUTPATIENT)
Dept: INTERNAL MEDICINE | Facility: CLINIC | Age: 85
End: 2020-03-10

## 2020-03-10 DIAGNOSIS — I10 ESSENTIAL HYPERTENSION: ICD-10-CM

## 2020-03-10 DIAGNOSIS — F33.1 MAJOR DEPRESSIVE DISORDER, RECURRENT EPISODE, MODERATE (H): ICD-10-CM

## 2020-03-10 DIAGNOSIS — H90.6 MIXED CONDUCTIVE AND SENSORINEURAL HEARING LOSS OF BOTH EARS: ICD-10-CM

## 2020-03-10 DIAGNOSIS — F51.01 PRIMARY INSOMNIA: ICD-10-CM

## 2020-03-10 ASSESSMENT — MIFFLIN-ST. JEOR: SCORE: 1162.04

## 2020-03-10 ASSESSMENT — PATIENT HEALTH QUESTIONNAIRE - PHQ9: SUM OF ALL RESPONSES TO PHQ QUESTIONS 1-9: 19

## 2020-03-11 ENCOUNTER — COMMUNICATION - HEALTHEAST (OUTPATIENT)
Dept: INTERNAL MEDICINE | Facility: CLINIC | Age: 85
End: 2020-03-11

## 2020-03-11 DIAGNOSIS — K21.9 GASTROESOPHAGEAL REFLUX DISEASE WITHOUT ESOPHAGITIS: ICD-10-CM

## 2020-04-03 ENCOUNTER — COMMUNICATION - HEALTHEAST (OUTPATIENT)
Dept: INTERNAL MEDICINE | Facility: CLINIC | Age: 85
End: 2020-04-03

## 2020-04-03 ENCOUNTER — COMMUNICATION - HEALTHEAST (OUTPATIENT)
Dept: FAMILY MEDICINE | Facility: CLINIC | Age: 85
End: 2020-04-03

## 2020-04-03 DIAGNOSIS — K21.9 GASTROESOPHAGEAL REFLUX DISEASE WITHOUT ESOPHAGITIS: ICD-10-CM

## 2020-04-03 DIAGNOSIS — F51.01 PRIMARY INSOMNIA: ICD-10-CM

## 2020-04-03 DIAGNOSIS — G47.00 INSOMNIA, UNSPECIFIED TYPE: ICD-10-CM

## 2020-04-03 DIAGNOSIS — I10 BENIGN ESSENTIAL HTN: ICD-10-CM

## 2020-04-03 RX ORDER — TRAZODONE HYDROCHLORIDE 100 MG/1
TABLET ORAL
Qty: 30 TABLET | Refills: 0 | OUTPATIENT
Start: 2020-04-03

## 2020-04-03 NOTE — TELEPHONE ENCOUNTER
It looks as though pt is now seeing Kevin Mckinley MD at [facility]. 3/10/2020 OV note: Establish Care (moved to [facility] with daughter Lesli )     Rosangela WHITMAN RN

## 2020-05-05 ENCOUNTER — OFFICE VISIT - HEALTHEAST (OUTPATIENT)
Dept: INTERNAL MEDICINE | Facility: CLINIC | Age: 85
End: 2020-05-05

## 2020-05-05 DIAGNOSIS — M19.012 PRIMARY LOCALIZED OSTEOARTHROSIS OF LEFT SHOULDER REGION: ICD-10-CM

## 2020-05-05 DIAGNOSIS — K21.9 GASTROESOPHAGEAL REFLUX DISEASE WITHOUT ESOPHAGITIS: ICD-10-CM

## 2020-05-05 DIAGNOSIS — I10 ESSENTIAL HYPERTENSION: ICD-10-CM

## 2020-05-05 DIAGNOSIS — F33.1 MAJOR DEPRESSIVE DISORDER, RECURRENT EPISODE, MODERATE (H): ICD-10-CM

## 2020-05-18 ENCOUNTER — COMMUNICATION - HEALTHEAST (OUTPATIENT)
Dept: INTERNAL MEDICINE | Facility: CLINIC | Age: 85
End: 2020-05-18

## 2020-05-18 DIAGNOSIS — K21.9 GASTROESOPHAGEAL REFLUX DISEASE WITHOUT ESOPHAGITIS: ICD-10-CM

## 2020-05-20 ENCOUNTER — COMMUNICATION - HEALTHEAST (OUTPATIENT)
Dept: INTERNAL MEDICINE | Facility: CLINIC | Age: 85
End: 2020-05-20

## 2020-06-03 ENCOUNTER — OFFICE VISIT - HEALTHEAST (OUTPATIENT)
Dept: INTERNAL MEDICINE | Facility: CLINIC | Age: 85
End: 2020-06-03

## 2020-06-03 DIAGNOSIS — M15.0 PRIMARY OSTEOARTHRITIS INVOLVING MULTIPLE JOINTS: ICD-10-CM
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
## 2020-06-03 DIAGNOSIS — E78.00 PURE HYPERCHOLESTEROLEMIA: ICD-10-CM

## 2020-06-03 DIAGNOSIS — I10 ESSENTIAL HYPERTENSION: ICD-10-CM

## 2020-06-22 ENCOUNTER — COMMUNICATION - HEALTHEAST (OUTPATIENT)
Dept: INTERNAL MEDICINE | Facility: CLINIC | Age: 85
End: 2020-06-22

## 2020-07-06 ENCOUNTER — OFFICE VISIT - HEALTHEAST (OUTPATIENT)
Dept: INTERNAL MEDICINE | Facility: CLINIC | Age: 85
End: 2020-07-06

## 2020-07-06 ENCOUNTER — COMMUNICATION - HEALTHEAST (OUTPATIENT)
Dept: INTERNAL MEDICINE | Facility: CLINIC | Age: 85
End: 2020-07-06

## 2020-07-06 DIAGNOSIS — E78.00 PURE HYPERCHOLESTEROLEMIA: ICD-10-CM

## 2020-07-06 DIAGNOSIS — F33.1 MAJOR DEPRESSIVE DISORDER, RECURRENT EPISODE, MODERATE (H): ICD-10-CM

## 2020-07-06 DIAGNOSIS — I10 ESSENTIAL HYPERTENSION: ICD-10-CM

## 2020-07-20 ENCOUNTER — OFFICE VISIT - HEALTHEAST (OUTPATIENT)
Dept: INTERNAL MEDICINE | Facility: CLINIC | Age: 85
End: 2020-07-20

## 2020-07-20 DIAGNOSIS — R05.9 COUGH: ICD-10-CM

## 2020-07-20 DIAGNOSIS — I10 ESSENTIAL HYPERTENSION: ICD-10-CM

## 2020-07-20 DIAGNOSIS — R06.83 SNORING: ICD-10-CM

## 2020-07-20 DIAGNOSIS — Z78.9 TAKES DIETARY SUPPLEMENTS: ICD-10-CM

## 2020-07-20 DIAGNOSIS — R42 DIZZINESS AND GIDDINESS: ICD-10-CM

## 2020-08-21 ENCOUNTER — COMMUNICATION - HEALTHEAST (OUTPATIENT)
Dept: INTERNAL MEDICINE | Facility: CLINIC | Age: 85
End: 2020-08-21

## 2020-10-19 DIAGNOSIS — E87.6 HYPOKALEMIA: ICD-10-CM

## 2020-10-20 NOTE — TELEPHONE ENCOUNTER
Routing refill request to provider for review/approval because:  Labs not current:   Potassium   Date Value Ref Range Status   01/18/2019 4.0 3.4 - 5.3 mmol/L Final     Due for visit.      KENNY BennettN, RN

## 2020-10-20 NOTE — TELEPHONE ENCOUNTER
"Requested Prescriptions   Pending Prescriptions Disp Refills     potassium chloride ER (MICRO-K) 10 MEQ CR capsule 30 capsule 11       Potassium Supplements Protocol Failed - 10/19/2020  5:45 PM        Failed - Recent (12 mo) or future (30 days) visit within the authorizing provider's department     Patient has had an office visit with the authorizing provider or a provider within the authorizing providers department within the previous 12 mos or has a future within next 30 days. See \"Patient Info\" tab in inbasket, or \"Choose Columns\" in Meds & Orders section of the refill encounter.              Failed - Normal serum potassium in past 12 months     Recent Labs   Lab Test 01/18/19  0522   POTASSIUM 4.0                    Passed - Medication is active on med list        Passed - Patient is age 18 or older             "

## 2020-10-21 RX ORDER — POTASSIUM CHLORIDE 750 MG/1
CAPSULE, EXTENDED RELEASE ORAL
Qty: 30 CAPSULE | Refills: 11 | Status: SHIPPED | OUTPATIENT
Start: 2020-10-21 | End: 2022-04-29

## 2020-11-05 ENCOUNTER — COMMUNICATION - HEALTHEAST (OUTPATIENT)
Dept: INTERNAL MEDICINE | Facility: CLINIC | Age: 85
End: 2020-11-05

## 2020-11-05 DIAGNOSIS — F51.01 PRIMARY INSOMNIA: ICD-10-CM

## 2020-11-05 DIAGNOSIS — I10 BENIGN ESSENTIAL HTN: ICD-10-CM

## 2020-11-05 DIAGNOSIS — F33.1 MAJOR DEPRESSIVE DISORDER, RECURRENT EPISODE, MODERATE (H): ICD-10-CM

## 2020-11-05 DIAGNOSIS — K21.9 GASTROESOPHAGEAL REFLUX DISEASE WITHOUT ESOPHAGITIS: ICD-10-CM

## 2020-11-09 ENCOUNTER — COMMUNICATION - HEALTHEAST (OUTPATIENT)
Dept: SCHEDULING | Facility: CLINIC | Age: 85
End: 2020-11-09

## 2020-11-09 ENCOUNTER — COMMUNICATION - HEALTHEAST (OUTPATIENT)
Dept: INTERNAL MEDICINE | Facility: CLINIC | Age: 85
End: 2020-11-09

## 2020-11-09 ENCOUNTER — AMBULATORY - HEALTHEAST (OUTPATIENT)
Dept: LAB | Facility: HOSPITAL | Age: 85
End: 2020-11-09

## 2020-11-09 DIAGNOSIS — R30.0 DYSURIA: ICD-10-CM

## 2020-11-09 DIAGNOSIS — R39.9 UTI SYMPTOMS: ICD-10-CM

## 2020-11-09 LAB
ALBUMIN UR-MCNC: ABNORMAL MG/DL
APPEARANCE UR: ABNORMAL
BACTERIA #/AREA URNS HPF: ABNORMAL HPF
BILIRUB UR QL STRIP: NEGATIVE
CAOX CRY #/AREA URNS HPF: PRESENT /[HPF]
COLOR UR AUTO: YELLOW
GLUCOSE UR STRIP-MCNC: NEGATIVE MG/DL
HGB UR QL STRIP: NEGATIVE
KETONES UR STRIP-MCNC: NEGATIVE MG/DL
LEUKOCYTE ESTERASE UR QL STRIP: ABNORMAL
MUCOUS THREADS #/AREA URNS LPF: ABNORMAL LPF
NITRATE UR QL: POSITIVE
PH UR STRIP: 5.5 [PH] (ref 4.5–8)
RBC #/AREA URNS AUTO: ABNORMAL HPF
SP GR UR STRIP: 1.02 (ref 1–1.03)
SQUAMOUS #/AREA URNS AUTO: ABNORMAL LPF
UROBILINOGEN UR STRIP-ACNC: ABNORMAL
WBC #/AREA URNS AUTO: >100 HPF
WBC CLUMPS #/AREA URNS HPF: PRESENT /[HPF]

## 2020-11-11 LAB — BACTERIA SPEC CULT: ABNORMAL

## 2020-11-17 ENCOUNTER — COMMUNICATION - HEALTHEAST (OUTPATIENT)
Dept: INTERNAL MEDICINE | Facility: CLINIC | Age: 85
End: 2020-11-17

## 2020-11-17 DIAGNOSIS — F33.1 MAJOR DEPRESSIVE DISORDER, RECURRENT EPISODE, MODERATE (H): ICD-10-CM

## 2020-11-17 DIAGNOSIS — F51.01 PRIMARY INSOMNIA: ICD-10-CM

## 2020-11-27 ENCOUNTER — COMMUNICATION - HEALTHEAST (OUTPATIENT)
Dept: INTERNAL MEDICINE | Facility: CLINIC | Age: 85
End: 2020-11-27

## 2020-11-27 DIAGNOSIS — F33.1 MAJOR DEPRESSIVE DISORDER, RECURRENT EPISODE, MODERATE (H): ICD-10-CM

## 2020-11-27 DIAGNOSIS — F51.01 PRIMARY INSOMNIA: ICD-10-CM

## 2020-12-02 ENCOUNTER — COMMUNICATION - HEALTHEAST (OUTPATIENT)
Dept: SCHEDULING | Facility: CLINIC | Age: 85
End: 2020-12-02

## 2020-12-03 ENCOUNTER — OFFICE VISIT - HEALTHEAST (OUTPATIENT)
Dept: INTERNAL MEDICINE | Facility: CLINIC | Age: 85
End: 2020-12-03

## 2020-12-03 DIAGNOSIS — I10 ESSENTIAL HYPERTENSION: ICD-10-CM

## 2020-12-03 DIAGNOSIS — N30.00 ACUTE CYSTITIS WITHOUT HEMATURIA: ICD-10-CM

## 2020-12-03 DIAGNOSIS — R46.89 CHANGE IN BEHAVIOR: ICD-10-CM

## 2020-12-03 LAB
ALBUMIN UR-MCNC: NEGATIVE MG/DL
ANION GAP SERPL CALCULATED.3IONS-SCNC: 12 MMOL/L (ref 5–18)
APPEARANCE UR: CLEAR
BACTERIA #/AREA URNS HPF: ABNORMAL HPF
BILIRUB UR QL STRIP: NEGATIVE
BUN SERPL-MCNC: 17 MG/DL (ref 8–28)
CALCIUM SERPL-MCNC: 9.1 MG/DL (ref 8.5–10.5)
CAOX CRY #/AREA URNS HPF: PRESENT /[HPF]
CHLORIDE BLD-SCNC: 104 MMOL/L (ref 98–107)
CO2 SERPL-SCNC: 25 MMOL/L (ref 22–31)
COLOR UR AUTO: YELLOW
CREAT SERPL-MCNC: 0.7 MG/DL (ref 0.6–1.1)
ERYTHROCYTE [DISTWIDTH] IN BLOOD BY AUTOMATED COUNT: 11.9 % (ref 11–14.5)
GFR SERPL CREATININE-BSD FRML MDRD: >60 ML/MIN/1.73M2
GLUCOSE BLD-MCNC: 125 MG/DL (ref 70–125)
GLUCOSE UR STRIP-MCNC: NEGATIVE MG/DL
HCT VFR BLD AUTO: 47.3 % (ref 35–47)
HGB BLD-MCNC: 15.9 G/DL (ref 12–16)
HGB UR QL STRIP: ABNORMAL
KETONES UR STRIP-MCNC: NEGATIVE MG/DL
LEUKOCYTE ESTERASE UR QL STRIP: ABNORMAL
MCH RBC QN AUTO: 32.2 PG (ref 27–34)
MCHC RBC AUTO-ENTMCNC: 33.6 G/DL (ref 32–36)
MCV RBC AUTO: 96 FL (ref 80–100)
NITRATE UR QL: NEGATIVE
PH UR STRIP: 5 [PH] (ref 5–8)
PLATELET # BLD AUTO: 199 THOU/UL (ref 140–440)
PMV BLD AUTO: 7.7 FL (ref 7–10)
POTASSIUM BLD-SCNC: 4 MMOL/L (ref 3.5–5)
RBC # BLD AUTO: 4.93 MILL/UL (ref 3.8–5.4)
RBC #/AREA URNS AUTO: ABNORMAL HPF
SODIUM SERPL-SCNC: 141 MMOL/L (ref 136–145)
SP GR UR STRIP: >=1.03 (ref 1–1.03)
SQUAMOUS #/AREA URNS AUTO: ABNORMAL LPF
UROBILINOGEN UR STRIP-ACNC: ABNORMAL
WBC #/AREA URNS AUTO: ABNORMAL HPF
WBC: 9.5 THOU/UL (ref 4–11)

## 2020-12-04 ENCOUNTER — COMMUNICATION - HEALTHEAST (OUTPATIENT)
Dept: INTERNAL MEDICINE | Facility: CLINIC | Age: 85
End: 2020-12-04

## 2020-12-04 LAB — BACTERIA SPEC CULT: NO GROWTH

## 2020-12-15 ENCOUNTER — COMMUNICATION - HEALTHEAST (OUTPATIENT)
Dept: INTERNAL MEDICINE | Facility: CLINIC | Age: 85
End: 2020-12-15

## 2020-12-15 DIAGNOSIS — F51.01 PRIMARY INSOMNIA: ICD-10-CM

## 2020-12-22 ENCOUNTER — COMMUNICATION - HEALTHEAST (OUTPATIENT)
Dept: INTERNAL MEDICINE | Facility: CLINIC | Age: 85
End: 2020-12-22

## 2020-12-22 DIAGNOSIS — F51.01 PRIMARY INSOMNIA: ICD-10-CM

## 2020-12-22 DIAGNOSIS — I10 BENIGN ESSENTIAL HTN: ICD-10-CM

## 2021-01-14 ENCOUNTER — COMMUNICATION - HEALTHEAST (OUTPATIENT)
Dept: INTERNAL MEDICINE | Facility: CLINIC | Age: 86
End: 2021-01-14

## 2021-01-14 DIAGNOSIS — F51.01 PRIMARY INSOMNIA: ICD-10-CM

## 2021-01-18 ENCOUNTER — COMMUNICATION - HEALTHEAST (OUTPATIENT)
Dept: INTERNAL MEDICINE | Facility: CLINIC | Age: 86
End: 2021-01-18

## 2021-01-18 DIAGNOSIS — F51.01 PRIMARY INSOMNIA: ICD-10-CM

## 2021-02-02 ENCOUNTER — OFFICE VISIT - HEALTHEAST (OUTPATIENT)
Dept: INTERNAL MEDICINE | Facility: CLINIC | Age: 86
End: 2021-02-02

## 2021-02-02 DIAGNOSIS — F51.01 PRIMARY INSOMNIA: ICD-10-CM

## 2021-02-02 DIAGNOSIS — F33.0 MILD EPISODE OF RECURRENT MAJOR DEPRESSIVE DISORDER (H): ICD-10-CM

## 2021-02-02 DIAGNOSIS — I10 ESSENTIAL HYPERTENSION: ICD-10-CM

## 2021-02-02 ASSESSMENT — PATIENT HEALTH QUESTIONNAIRE - PHQ9: SUM OF ALL RESPONSES TO PHQ QUESTIONS 1-9: 13

## 2021-03-13 DIAGNOSIS — I10 ESSENTIAL HYPERTENSION: ICD-10-CM

## 2021-03-15 RX ORDER — VERAPAMIL HYDROCHLORIDE 180 MG/1
CAPSULE, EXTENDED RELEASE ORAL
Qty: 30 CAPSULE | Refills: 0 | OUTPATIENT
Start: 2021-03-15

## 2021-03-15 NOTE — TELEPHONE ENCOUNTER
"Requested Prescriptions   Pending Prescriptions Disp Refills     verapamil ER (VERELAN) 180 MG 24 hr capsule [Pharmacy Med Name: Verapamil HCl  MG Oral Capsule Extended Release 24 Hour] 30 capsule 0     Sig: Take 1 capsule by mouth once daily       Calcium Channel Blockers Protocol  Failed - 3/13/2021  6:54 PM        Failed - Blood pressure under 140/90 in past 12 months     BP Readings from Last 3 Encounters:   06/09/19 144/72   05/01/19 124/72   03/04/19 152/80                 Failed - Normal ALT in past 12 months     Recent Labs   Lab Test 01/16/19  1137   ALT 12             Failed - Recent (12 mo) or future (30 days) visit within the authorizing provider's specialty     Patient has had an office visit with the authorizing provider or a provider within the authorizing providers department within the previous 12 mos or has a future within next 30 days. See \"Patient Info\" tab in inbasket, or \"Choose Columns\" in Meds & Orders section of the refill encounter.              Failed - Normal serum creatinine on file in past 12 months     Recent Labs   Lab Test 01/18/19  0522   CR 0.54       Ok to refill medication if creatinine is low          Passed - Medication is active on med list        Passed - Patient is age 18 or older        Passed - No active pregnancy on record        Passed - No positive pregnancy test in past 12 months             "

## 2021-03-17 DIAGNOSIS — I10 ESSENTIAL HYPERTENSION: ICD-10-CM

## 2021-03-17 RX ORDER — VERAPAMIL HYDROCHLORIDE 180 MG/1
CAPSULE, EXTENDED RELEASE ORAL
Qty: 30 CAPSULE | Refills: 0 | OUTPATIENT
Start: 2021-03-17

## 2021-03-17 NOTE — TELEPHONE ENCOUNTER
"Requested Prescriptions   Pending Prescriptions Disp Refills     verapamil ER (VERELAN) 180 MG 24 hr capsule [Pharmacy Med Name: Verapamil HCl  MG Oral Capsule Extended Release 24 Hour] 30 capsule 0     Sig: Take 1 capsule by mouth once daily       Calcium Channel Blockers Protocol  Failed - 3/17/2021 12:00 AM        Failed - Blood pressure under 140/90 in past 12 months     BP Readings from Last 3 Encounters:   06/09/19 144/72   05/01/19 124/72   03/04/19 152/80                 Failed - Normal ALT in past 12 months     Recent Labs   Lab Test 01/16/19  1137   ALT 12             Failed - Recent (12 mo) or future (30 days) visit within the authorizing provider's specialty     Patient has had an office visit with the authorizing provider or a provider within the authorizing providers department within the previous 12 mos or has a future within next 30 days. See \"Patient Info\" tab in inbasket, or \"Choose Columns\" in Meds & Orders section of the refill encounter.              Failed - Normal serum creatinine on file in past 12 months     Recent Labs   Lab Test 01/18/19  0522   CR 0.54       Ok to refill medication if creatinine is low          Passed - Medication is active on med list        Passed - Patient is age 18 or older        Passed - No active pregnancy on record        Passed - No positive pregnancy test in past 12 months             "

## 2021-03-18 ENCOUNTER — COMMUNICATION - HEALTHEAST (OUTPATIENT)
Dept: INTERNAL MEDICINE | Facility: CLINIC | Age: 86
End: 2021-03-18

## 2021-03-18 DIAGNOSIS — I10 BENIGN ESSENTIAL HTN: ICD-10-CM

## 2021-03-18 DIAGNOSIS — F51.01 PRIMARY INSOMNIA: ICD-10-CM

## 2021-03-26 ENCOUNTER — RECORDS - HEALTHEAST (OUTPATIENT)
Dept: ADMINISTRATIVE | Facility: OTHER | Age: 86
End: 2021-03-26

## 2021-03-31 ENCOUNTER — HOSPITAL ENCOUNTER (OUTPATIENT)
Dept: MRI IMAGING | Facility: HOSPITAL | Age: 86
Discharge: HOME | End: 2021-03-31

## 2021-03-31 DIAGNOSIS — H49.01 THIRD NERVE PALSY OF RIGHT EYE: ICD-10-CM

## 2021-03-31 LAB
CREAT BLD-MCNC: 0.6 MG/DL (ref 0.6–1.1)
GFR SERPL CREATININE-BSD FRML MDRD: >60 ML/MIN/1.73M2

## 2021-04-07 ENCOUNTER — OFFICE VISIT - HEALTHEAST (OUTPATIENT)
Dept: INTERNAL MEDICINE | Facility: CLINIC | Age: 86
End: 2021-04-07

## 2021-04-07 DIAGNOSIS — R10.84 ABDOMINAL PAIN, GENERALIZED: ICD-10-CM

## 2021-04-07 DIAGNOSIS — E78.00 PURE HYPERCHOLESTEROLEMIA: ICD-10-CM

## 2021-04-07 DIAGNOSIS — H49.9: ICD-10-CM

## 2021-04-07 DIAGNOSIS — I10 ESSENTIAL HYPERTENSION: ICD-10-CM

## 2021-04-07 DIAGNOSIS — F33.1 MAJOR DEPRESSIVE DISORDER, RECURRENT EPISODE, MODERATE (H): ICD-10-CM

## 2021-04-07 LAB
ALBUMIN SERPL-MCNC: 3.5 G/DL (ref 3.5–5)
ALBUMIN UR-MCNC: ABNORMAL G/DL
ALP SERPL-CCNC: 83 U/L (ref 45–120)
ALT SERPL W P-5'-P-CCNC: 10 U/L (ref 0–45)
ANION GAP SERPL CALCULATED.3IONS-SCNC: 12 MMOL/L (ref 5–18)
APPEARANCE UR: CLEAR
AST SERPL W P-5'-P-CCNC: 12 U/L (ref 0–40)
BACTERIA #/AREA URNS HPF: ABNORMAL /[HPF]
BILIRUB DIRECT SERPL-MCNC: 0.2 MG/DL
BILIRUB SERPL-MCNC: 0.7 MG/DL (ref 0–1)
BILIRUB UR QL STRIP: NEGATIVE
BUN SERPL-MCNC: 18 MG/DL (ref 8–28)
C REACTIVE PROTEIN LHE: 0.3 MG/DL (ref 0–0.8)
CALCIUM SERPL-MCNC: 8.7 MG/DL (ref 8.5–10.5)
CAOX CRY #/AREA URNS HPF: ABNORMAL /[HPF]
CHLORIDE BLD-SCNC: 105 MMOL/L (ref 98–107)
CHOLEST SERPL-MCNC: 156 MG/DL
CO2 SERPL-SCNC: 23 MMOL/L (ref 22–31)
COLOR UR AUTO: YELLOW
CREAT SERPL-MCNC: 0.66 MG/DL (ref 0.6–1.1)
ERYTHROCYTE [DISTWIDTH] IN BLOOD BY AUTOMATED COUNT: 13.1 % (ref 11–14.5)
ERYTHROCYTE [SEDIMENTATION RATE] IN BLOOD BY WESTERGREN METHOD: 13 MM/HR (ref 0–20)
FASTING STATUS PATIENT QL REPORTED: YES
GFR SERPL CREATININE-BSD FRML MDRD: >60 ML/MIN/1.73M2
GLUCOSE BLD-MCNC: 160 MG/DL (ref 70–125)
GLUCOSE UR STRIP-MCNC: NEGATIVE MG/DL
HCT VFR BLD AUTO: 46.9 % (ref 35–47)
HDLC SERPL-MCNC: 33 MG/DL
HGB BLD-MCNC: 14.9 G/DL (ref 12–16)
HGB UR QL STRIP: NEGATIVE
KETONES UR STRIP-MCNC: NEGATIVE MG/DL
LDLC SERPL CALC-MCNC: 62 MG/DL
LEUKOCYTE ESTERASE UR QL STRIP: ABNORMAL
MCH RBC QN AUTO: 30.3 PG (ref 27–34)
MCHC RBC AUTO-ENTMCNC: 31.8 G/DL (ref 32–36)
MCV RBC AUTO: 95 FL (ref 80–100)
NITRATE UR QL: POSITIVE
PH UR STRIP: 5 [PH] (ref 5–8)
PLATELET # BLD AUTO: 186 THOU/UL (ref 140–440)
PMV BLD AUTO: 10.3 FL (ref 7–10)
POTASSIUM BLD-SCNC: 3.8 MMOL/L (ref 3.5–5)
PROT SERPL-MCNC: 6.5 G/DL (ref 6–8)
RBC # BLD AUTO: 4.92 MILL/UL (ref 3.8–5.4)
RBC #/AREA URNS AUTO: ABNORMAL HPF
SODIUM SERPL-SCNC: 140 MMOL/L (ref 136–145)
SP GR UR STRIP: >=1.03 (ref 1–1.03)
SQUAMOUS #/AREA URNS AUTO: ABNORMAL LPF
TRIGL SERPL-MCNC: 306 MG/DL
UROBILINOGEN UR STRIP-ACNC: ABNORMAL
WBC #/AREA URNS AUTO: ABNORMAL HPF
WBC: 8.4 THOU/UL (ref 4–11)

## 2021-04-09 ENCOUNTER — COMMUNICATION - HEALTHEAST (OUTPATIENT)
Dept: SCHEDULING | Facility: CLINIC | Age: 86
End: 2021-04-09

## 2021-04-09 DIAGNOSIS — N30.00 ACUTE CYSTITIS WITHOUT HEMATURIA: ICD-10-CM

## 2021-04-09 LAB — BACTERIA SPEC CULT: ABNORMAL

## 2021-04-15 ENCOUNTER — COMMUNICATION - HEALTHEAST (OUTPATIENT)
Dept: INTERNAL MEDICINE | Facility: CLINIC | Age: 86
End: 2021-04-15

## 2021-04-15 DIAGNOSIS — K21.9 GASTROESOPHAGEAL REFLUX DISEASE WITHOUT ESOPHAGITIS: ICD-10-CM

## 2021-04-16 ENCOUNTER — COMMUNICATION - HEALTHEAST (OUTPATIENT)
Dept: ADMINISTRATIVE | Facility: CLINIC | Age: 86
End: 2021-04-16

## 2021-05-15 ENCOUNTER — COMMUNICATION - HEALTHEAST (OUTPATIENT)
Dept: INTERNAL MEDICINE | Facility: CLINIC | Age: 86
End: 2021-05-15

## 2021-05-15 DIAGNOSIS — K21.9 GASTROESOPHAGEAL REFLUX DISEASE WITHOUT ESOPHAGITIS: ICD-10-CM

## 2021-05-25 ENCOUNTER — RECORDS - HEALTHEAST (OUTPATIENT)
Dept: ADMINISTRATIVE | Facility: CLINIC | Age: 86
End: 2021-05-25

## 2021-05-26 ENCOUNTER — RECORDS - HEALTHEAST (OUTPATIENT)
Dept: ADMINISTRATIVE | Facility: CLINIC | Age: 86
End: 2021-05-26

## 2021-05-27 VITALS — OXYGEN SATURATION: 98 % | DIASTOLIC BLOOD PRESSURE: 68 MMHG | HEART RATE: 93 BPM | SYSTOLIC BLOOD PRESSURE: 102 MMHG

## 2021-05-27 ASSESSMENT — PATIENT HEALTH QUESTIONNAIRE - PHQ9
SUM OF ALL RESPONSES TO PHQ QUESTIONS 1-9: 19
SUM OF ALL RESPONSES TO PHQ QUESTIONS 1-9: 13

## 2021-05-27 NOTE — PROGRESS NOTES
ASSESSMENT/PLAN:  Chronic fatigue  This is a 36-year-old female who comes in today for complaints of chronic fatigue that she has had for at least a couple years.  We spoke about differential diagnoses.  She denies sleep or depression contributing to the fatigue.  We will do lab work up.  If all lab results come back normal, may want to consider a stimulant such as Adderall.  Patient verbalized understanding and agreed with the plan  -     Thyroid Stimulating Hormone (TSH)  -     Lyme Antibody Cascade  -     Vitamin D, Total (25-Hydroxy)  -     Comprehensive Metabolic Panel  -     HM2(CBC w/o Differential)  -     Vitamin B12  -     Mononucleosis Screen    SUBJECTIVE:    Brittany Pickard is a 36 y.o. female who came in today concerns regarding fatigue.  Patient describes a sense of low energy throughout the day worse during the day and better when she returns home from work.  States that she has very little energy and feels very tired during the day.  At night when she comes home and after shower while eating dinner she feels the most refreshed.  Previously she had a history of sleeping concerns but this is not so much the case currently.  States that she has no problems with sleeping.  States that she is falls asleep and sleeps through the night without difficulties.  Also states that she does not have any concerns regarding depression or anxiety.  She thinks that she may be working too much.  However, she finds herself to be feeling tired even on the weekend when she is not working.    Denies fever, chills, chest pain, shortness of breath, rash, joint pain, excessive bleeding, change in weight.  She has had heartburn and gastroesophageal reflux symptoms and burning in the chest.  She has had easy bruising in the past related to trauma.  Vaginal bleeding or cycles are normal.    Review of Systems (except those mentioned above)  Constitutional: Negative.   HENT: Negative.   Eyes: Negative.   Respiratory: Negative.  Clinic Care Coordination Contact  Outreach    Call placed to patient for follow-up.  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  When asked about her depression she said she was doing okay.  She appears to be feeling sad about selling the home and moving to the building where her daughter lives.  At this time she had no questions or concerns declined any needs.    Will ask home care to let  know if there is any concerns for follow-up.   will check chart in about 2 weeks and review home care notes.    Patient's daughter still has not returned phone call to . Another message was left.     Spoke with Nella Franco, home care RN case manager.  She will update Sw at discharge on future needs.  She did say that the Home care SW will be meeting with pt/daughter.     Plan: We will wait for a return phone call from daughter.  Patient to continue to accept home care.    ALESSANDRA Keith, Clinic Care Coordinator 3/8/2019   2:26 PM  [phone]       Cardiovascular: Negative.   Gastrointestinal: Negative.   Endocrine: Negative.   Genitourinary: Negative.   Musculoskeletal: Negative.   Skin: Negative.   Allergic/Immunologic: Negative.   Neurological: Negative.   Hematological: Negative.   Psychiatric/Behavioral: Negative.     Patient Active Problem List    Diagnosis Date Noted     Allergic Rhinitis      Insomnia      Vitamin D Deficiency      Allergies   Allergen Reactions     Other Allergy (See Comments)      Melatonin TABS, 2013.       No current outpatient medications on file.     No current facility-administered medications for this visit.      No past medical history on file.  Past Surgical History:   Procedure Laterality Date     AUGMENTATION MAMMAPLASTY       NM  DELIVERY ONLY      Description:  Section;  Recorded: 2011;     Social History     Socioeconomic History     Marital status: Single     Spouse name: None     Number of children: None     Years of education: None     Highest education level: None   Occupational History     None   Social Needs     Financial resource strain: None     Food insecurity:     Worry: None     Inability: None     Transportation needs:     Medical: None     Non-medical: None   Tobacco Use     Smoking status: Never Smoker     Smokeless tobacco: Never Used   Substance and Sexual Activity     Alcohol use: None     Drug use: None     Sexual activity: None   Lifestyle     Physical activity:     Days per week: None     Minutes per session: None     Stress: None   Relationships     Social connections:     Talks on phone: None     Gets together: None     Attends Alevism service: None     Active member of club or organization: None     Attends meetings of clubs or organizations: None     Relationship status: None     Intimate partner violence:     Fear of current or ex partner: None     Emotionally abused: None     Physically abused: None     Forced sexual activity: None   Other Topics Concern     None    Social History Narrative    ** Merged History Encounter **          No family history on file.      OBJECTIVE:    Vitals:    04/17/19 1414   BP: 120/70   Pulse: 80   Weight: 150 lb 14.4 oz (68.4 kg)     Body mass index is 25.31 kg/m .    Physical Exam:  Constitutional: Patient is oriented to person, place, and time. Patient appears well-developed and well-nourished. No distress.   Head: Normocephalic and atraumatic.   Right Ear: External ear normal.   Left Ear: External ear normal.   Nose: Nose normal.   Mouth/Throat: Oropharynx is clear and moist. No oropharyngeal exudate.   Eyes: Conjunctivae and EOM are normal. Right eye exhibits no discharge. Left eye exhibits no discharge. No scleral icterus.   Neurological: Patient is alert and oriented to person, place, and time. Patient has normal reflexes. No cranial nerve deficit. Coordination normal.   Skin: No rash noted. Patient is not diaphoretic. No erythema. No pallor.      Results for orders placed or performed in visit on 04/17/19   HM2(CBC w/o Differential)   Result Value Ref Range    WBC 8.9 4.0 - 11.0 thou/uL    RBC 4.40 3.80 - 5.40 mill/uL    Hemoglobin 13.2 12.0 - 16.0 g/dL    Hematocrit 39.0 35.0 - 47.0 %    MCV 89 80 - 100 fL    MCH 29.9 27.0 - 34.0 pg    MCHC 33.7 32.0 - 36.0 g/dL    RDW 12.8 11.0 - 14.5 %    Platelets 239 140 - 440 thou/uL    MPV 7.9 7.0 - 10.0 fL   Mononucleosis Screen   Result Value Ref Range    Mono Screen Negative Negative

## 2021-05-28 ENCOUNTER — RECORDS - HEALTHEAST (OUTPATIENT)
Dept: ADMINISTRATIVE | Facility: CLINIC | Age: 86
End: 2021-05-28

## 2021-05-29 ENCOUNTER — RECORDS - HEALTHEAST (OUTPATIENT)
Dept: ADMINISTRATIVE | Facility: CLINIC | Age: 86
End: 2021-05-29

## 2021-05-30 ENCOUNTER — RECORDS - HEALTHEAST (OUTPATIENT)
Dept: ADMINISTRATIVE | Facility: CLINIC | Age: 86
End: 2021-05-30

## 2021-05-31 ENCOUNTER — RECORDS - HEALTHEAST (OUTPATIENT)
Dept: ADMINISTRATIVE | Facility: CLINIC | Age: 86
End: 2021-05-31

## 2021-05-31 VITALS — BODY MASS INDEX: 26.8 KG/M2 | HEIGHT: 64 IN | WEIGHT: 157 LBS

## 2021-05-31 VITALS — BODY MASS INDEX: 26.29 KG/M2 | WEIGHT: 154 LBS | HEIGHT: 64 IN

## 2021-05-31 VITALS — WEIGHT: 149 LBS | BODY MASS INDEX: 25.44 KG/M2 | HEIGHT: 64 IN

## 2021-06-01 VITALS — WEIGHT: 150.44 LBS | HEIGHT: 64 IN | BODY MASS INDEX: 25.68 KG/M2

## 2021-06-01 VITALS — HEIGHT: 64 IN | BODY MASS INDEX: 26.46 KG/M2 | WEIGHT: 155 LBS

## 2021-06-01 VITALS — BODY MASS INDEX: 26.26 KG/M2 | WEIGHT: 153 LBS

## 2021-06-02 VITALS — WEIGHT: 151 LBS | BODY MASS INDEX: 25.78 KG/M2 | HEIGHT: 64 IN

## 2021-06-04 VITALS
BODY MASS INDEX: 29.01 KG/M2 | SYSTOLIC BLOOD PRESSURE: 112 MMHG | HEART RATE: 81 BPM | DIASTOLIC BLOOD PRESSURE: 60 MMHG | WEIGHT: 169 LBS | OXYGEN SATURATION: 95 %

## 2021-06-04 VITALS
HEART RATE: 102 BPM | DIASTOLIC BLOOD PRESSURE: 60 MMHG | WEIGHT: 168 LBS | OXYGEN SATURATION: 95 % | BODY MASS INDEX: 28.68 KG/M2 | HEIGHT: 64 IN | SYSTOLIC BLOOD PRESSURE: 124 MMHG

## 2021-06-05 VITALS
TEMPERATURE: 97.7 F | BODY MASS INDEX: 29.01 KG/M2 | HEART RATE: 103 BPM | OXYGEN SATURATION: 97 % | HEIGHT: 64 IN | DIASTOLIC BLOOD PRESSURE: 80 MMHG | SYSTOLIC BLOOD PRESSURE: 118 MMHG

## 2021-06-05 VITALS
DIASTOLIC BLOOD PRESSURE: 76 MMHG | OXYGEN SATURATION: 96 % | HEART RATE: 101 BPM | WEIGHT: 165 LBS | SYSTOLIC BLOOD PRESSURE: 124 MMHG | BODY MASS INDEX: 28.32 KG/M2 | TEMPERATURE: 98.3 F

## 2021-06-06 NOTE — TELEPHONE ENCOUNTER
Question following Office Visit  When did you see your provider: Yesterday  What is your question: Patient's daughter, Lesli, stated she spoke to Dr. Mckinley about the patient's ranitidine and she would like to know what alternative should the patient be switched to.    Pharmacy:  [facility]  Okay to leave a detailed message: No  [phone]

## 2021-06-06 NOTE — TELEPHONE ENCOUNTER
Who is calling: Daughter  Reason for Call:  Patient wants to be seen by  but not taking new patient, Daughter was told she could be seen but no note in chart please.  Date of last appointment with primary care: N/A  Okay to leave a detailed message: Yes

## 2021-06-06 NOTE — TELEPHONE ENCOUNTER
Spoke with the patient's daughter, Lesli, and helped to schedule an appointment for the patient to see Dr. Mckinley next week on 3/10/2020 at 1 pm.  She verbalized understanding and had no further questions at this time.  Andreia DALE CMA/JANENE....................10:55 AM

## 2021-06-06 NOTE — TELEPHONE ENCOUNTER
Spoke with Lesli, the patient's daughter, and relayed message below from Dr. Mckinley.  She verbalized understanding and had no further questions at this time.    Prescription has been set up for Dr. Mckinley to review.  Andreia DALE, DAMIAN/CMT....................12:31 PM

## 2021-06-06 NOTE — PROGRESS NOTES
Office Visit - New Patient   Nimo Concepcion   89 y.o.  female    Date of visit: 3/10/2020  Physician: Kevin Mckinley MD     Assessment and Plan   1. Essential hypertension  Controlled.  Continue verapamil.    2. Moderate Recurrent Major Depression  Still has major depression.  PHQ 9 was high at 19. Apparently she is not taking her citalopram.  Advised to resume citalopram given to her by her previous MD.   will reassess her depression in 4 weeks.  - citalopram (CELEXA) 20 MG tablet; Take 1.5 tablets (30 mg total) by mouth daily.  Dispense: 90 tablet; Refill: 3    3. Primary insomnia  Has primary insomnia.  Already takes trazodone prescribed by her previous MD.  Will add melatonin.  - melatonin 1 mg Tab tablet; Take 3 tablets (3 mg total) by mouth at bedtime as needed.  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  Due to her advanced age of 89.  Does not want hearing aid.      FOLLOWUP in 1 month.     Chief Complaint   Establish Care (moved to [facility] with daughter Lesli )       Patient Profile   Social History     Social History Narrative    . 4 grown children. Lives in a senior complex apartment. Nonsmoker. Non alcohol drinker. Retired .        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     History of Present Illness   This 89 y.o. old female is here for the first time to  establish her care.  Used to go to the [facility] in [place].  Saw Dr. Devaughn Wren. Was last seen in May 2019.  Has hypertension now controlled by verapamil.  Has major depression.  Supposed to take citalopram but apparently she stopped taking it.  Needs PHQ 9 to evaluate her her depression.  Also has primary insomnia for which he takes trazodone.  But she reports that she does not sleep still well even with trazodone.  She is hard of hearing.  But overall, she seems doing fairly well despite her advanced age of 89.  No complaints during this visit.    Review of Systems   A 12 point comprehensive review of systems was negative except as noted.         Medications and Allergies   Current Outpatient Medications   Medication Sig     ANTIOX #11/OM3/DHA/EPA/LUT/HENRIK (OCUVITE ADULT 50+ ORAL) Take 1 tablet by mouth daily.      CALCIUM & MAGNESIUM CARBONATES ORAL Take by mouth.     cholecalciferol, vitamin D3, (VITAMIN D3) 2,000 unit cap Take by mouth.     citalopram (CELEXA) 20 MG tablet Take 1.5 tablets (30 mg total) by mouth daily.     CRANBERRY EXTRACT ORAL Take by mouth.     MULTIVIT &MINERALS/FERROUS FUM (MULTI VITAMIN ORAL) Take by mouth.     OMEGA-3/DHA/EPA/FISH OIL (FISH OIL-OMEGA-3 FATTY ACIDS) 300-1,000 mg capsule Take by mouth daily.     potassium chloride (KLOR-CON) 10 MEQ CR tablet Take 3 tablets (30 mEq total) by mouth daily.     traZODone (DESYREL) 100 MG tablet Take 1-2 tablets (100-200 mg total) by mouth at bedtime.     melatonin 1 mg Tab tablet Take 3 tablets (3 mg total) by mouth at bedtime as needed.     verapamiL (VERELAN PM) 180 MG 24 hr capsule Take 180 mg by mouth daily.     No Known Allergies     Family and Social History   No family history on file.     Social History     Tobacco Use     Smoking status: Never Smoker     Smokeless tobacco: Never Used   Substance Use Topics     Alcohol use: No     Drug use: No        Physical Exam   Physical Exam    General Appearance:    Alert, cooperative,  no distress, appears stated age pleasant,    hard of hearing   Head:    Normocephalic, without obvious abnormality, atraumatic   Eyes:    PERRL, conjunctiva/corneas clear, EOM's intact, fundi     benign, both eyes   Ears:    Normal TM's and external ear canals, both ears   Nose:   Nares normal, septum midline, mucosa normal, no drainage    or sinus tenderness   Throat:   Lips, mucosa, and tongue normal; teeth and gums normal   Neck:   Supple, symmetrical, trachea midline, no adenopathy;     thyroid:  no enlargement/tenderness/nodules; no carotid    bruit or JVD   Back:     Symmetric, no curvature, ROM normal, no CVA tenderness   Lungs:     Clear to auscultation bilaterally, respirations unlabored   Chest Wall:    No tenderness or deformity    Heart:    Regular rate and rhythm, S1 and S2 normal, no murmur, rub   or gallop   Abdomen:     Soft, non-tender, bowel sounds active all four quadrants,     no masses, no organomegaly   Extremities:   Extremities normal, atraumatic, no cyanosis or edema   Pulses:   2+ and symmetric all extremities   Skin:   Skin color, texture, turgor normal, no rashes or lesions   Lymph nodes:   Cervical, supraclavicular, and axillary nodes normal   Neurologic:   CNII-XII intact, normal strength, sensation and reflexes     throughout        Additional Information   Post Discharge Medication Reconciliation Status: discharge medications reconciled and changed, per note/orders (see AVS)     Time: total time spent with the patient was 45 minutes of which >50% was spent in counseling and coordination of care     Kevin Mckinley MD  Internal Medicine  Contact me at [phone]

## 2021-06-07 NOTE — TELEPHONE ENCOUNTER
Medication Question or Clarification  Who is calling: Herson Ballesteros  What medication are you calling about (include dose and sig)?:   famotidine (PEPCID) 20 MG tablet  90 tablet  1  3/11/2020      Sig - Route: Take 1 tablet (20 mg total) by mouth daily. - Oral     Sent to pharmacy as: famotidine 20 mg tablet (PEPCID)     E-Prescribing Status: Receipt confirmed by pharmacy (3/11/2020 12:46 PM CDT)      potassium chloride (KLOR-CON) 10 MEQ CR tablet  270 tablet  1  9/5/2018      Sig - Route: Take 3 tablets (30 mEq total) by mouth daily. - Oral     Sent to pharmacy as: potassium chloride (KLOR-CON) 10 MEQ CR tablet       traZODone (DESYREL) 100 MG tablet  180 tablet  1  10/25/2018      Sig - Route: Take 1-2 tablets (100-200 mg total) by mouth at bedtime. - Oral     Sent to pharmacy as: traZODone (DESYREL) 100 MG tablet     E-Prescribing Status: Receipt confirmed by pharmacy (10/25/2018 12:01 PM CDT      verapamiL (VERELAN PM) 180 MG 24 hr capsule    1/29/2020      Sig - Route: Take 180 mg by mouth daily. - Oral     Class: Historical Med           Who prescribed the medication?: Devaughn Wren MD  -[facility]  What is your question/concern?:     famotidine (PEPCID) 20 MG tablet -I was told this medication was to replace the ranitidine and when I got to the pharmacy they told me this medication was pulled off the shelves too. Please replace this medication as mom has no antiacid medication.     traZODone (DESYREL) 100 MG tablet-this medication was only filled for 10 days on 3/1/20 and she is totally out of this.    verapamiL (VERELAN PM) 180 MG 24 hr capsule-this medication was only filled for 10 days on 3/1/20 and she is totally out of this also.  Caller was advised Kevin Mckinley MD is not scheduled in clinic today and she request a covering provider to fill these to at least cover the weekend.     Requested Pharmacy: [facility]  Okay to leave a detailed message?: Yes

## 2021-06-07 NOTE — TELEPHONE ENCOUNTER
Per message below from Dr. Casper, refills and prescription has been set up for him to review.    Spoke with Lesli, the patient's daughter and relayed message below from Dr. Casper regarding requested refills.  She verbalized understanding and had no further questions at this time.  Andreia DALE CMA/JANENE....................10:56 AM

## 2021-06-07 NOTE — PROGRESS NOTES
"Nimo Concepcion is a 89 y.o. female who is being evaluated via a billable telephone visit.      The patient has been notified of following:     \"This telephone visit will be conducted via a call between you and your physician/provider. We have found that certain health care needs can be provided without the need for a physical exam.  This service lets us provide the care you need with a short phone conversation.  If a prescription is necessary we can send it directly to your pharmacy.  If lab work is needed we can place an order for that and you can then stop by our lab to have the test done at a later time.    Telephone visits are billed at different rates depending on your insurance coverage. During this emergency period, for some insurers they may be billed the same as an in-person visit.  Please reach out to your insurance provider with any questions.    If during the course of the call the physician/provider feels a telephone visit is not appropriate, you will not be charged for this service.\"    Patient has given verbal consent to a Telephone visit? Yes    What phone number would you like to be contacted at? [phone]      Additional provider notes: Phone visit with Nimo for follow-up.  Complains of increased epigastric distress and feeling of bloatedness.  Already takes omeprazole 20 mg daily for GERD.  Has hypertension controlled by verapamil.  Has recurringleft shoulder pains due to her osteoarthritis.  Has difficulty moving her left shoulder because of her pains.  Has moderate recurring major depression.  But  this seems to be controlled at this time.  Takes citalopram and trazodone.  Overall, stable.    Assessment/Plan:  1. Gastroesophageal reflux disease without esophagitis  Continue famotidine 20 mg daily and add famotidine at bedtime.  Prescription of famotidine was sent to her pharmacy.    2. Essential hypertension  Controlled.  Continue verapamil.    3. Primary localized osteoarthrosis of left " shoulder region  Advised to take acetaminophen 4 times a day as needed.    4. Moderate Recurrent Major Depression  Continue citalopram and trazodone.        Phone call duration:  12 minutes    Sharron Main LPN

## 2021-06-07 NOTE — TELEPHONE ENCOUNTER
"Dr. Casper,  Please review the following message, especially the following: \"famotidine (PEPCID) 20 MG tablet -I was told this medication was to replace the ranitidine and when I got to the pharmacy they told me this medication was pulled off the shelves too. Please replace this medication as mom has no antiacid medication.\"    Okay for other refills requested?  Please advise on famotidine and advise if a different prescription needs to be sent.  Thank you.  Andreia DALE CMA/JANENE....................10:10 AM    "

## 2021-06-08 NOTE — PROGRESS NOTES
"Nimo Concepcion is a 89 y.o. female who is being evaluated via a billable telephone visit.      The patient has been notified of following:     \"This telephone visit will be conducted via a call between you and your physician/provider. We have found that certain health care needs can be provided without the need for a physical exam.  This service lets us provide the care you need with a short phone conversation.  If a prescription is necessary we can send it directly to your pharmacy.  If lab work is needed we can place an order for that and you can then stop by our lab to have the test done at a later time.    Telephone visits are billed at different rates depending on your insurance coverage. During this emergency period, for some insurers they may be billed the same as an in-person visit.  Please reach out to your insurance provider with any questions.    If during the course of the call the physician/provider feels a telephone visit is not appropriate, you will not be charged for this service.\"    Patient has given verbal consent to a Telephone visit? Yes    What phone number would you like to be contacted at? [phone]    Patient would like to receive their AVS by AVS Preference: Mail a copy.    Additional provider notes: Phone visit with Nimo through her daughter, Lesli for follow-up.  Has hypertension controlled by verapamil.  Has pure hypercholesterolemia and does not need treatment for this anymore because of her advanced age.  Has recurring joint pains due to osteoarthritis involving multiple joints.  This time complains of left shoulder pains.  Apparently had a fall 3 nights ago when he went to the bathroom.  Did not hit her head no incurred injury or trauma.  Overall, she is doing and feeling well.    Assessment/Plan:  1. Essential hypertension  Continue verapamil.    2. Essential Hypercholesterolemia  No need for treatment.    3. Primary osteoarthritis involving multiple joints  Advised to take " Tylenol as needed and okay to apply OTC pain salve or ointment to the joints as needed.    4. Fall, initial encounter  Fell 3 nights ago but did not  incur injury or trauma.        Phone call duration: 12 minutes    Sharron Main LPN

## 2021-06-08 NOTE — TELEPHONE ENCOUNTER
Called and discussed with daughter. Informed that her mother will continue trazodone 100 mg at bedtime and citalopram 30 mg daily since they are now working well for her depression, anxiety and sleep. Please schedule a follow up video or phone visit with me 2 weeks.

## 2021-06-08 NOTE — TELEPHONE ENCOUNTER
Spoke with the patient's daughter and relayed message below from Dr. Mckinley.  She verbalized understanding and has scheduled a phone visit with Dr. Mckinley for June 3rd.  There were no further questions at this time.  Andreia DALE CMA/JANENE....................11:50 AM

## 2021-06-08 NOTE — TELEPHONE ENCOUNTER
Refill Approved    Rx renewed per Medication Renewal Policy. Medication was last renewed on 4/3/20.    Claudia Cesar, [facility] [place] Connection Triage/Med Refill 5/20/2020     Requested Prescriptions   Pending Prescriptions Disp Refills     omeprazole (PRILOSEC) 20 MG capsule [Pharmacy Med Name: Omeprazole 20 MG Oral Capsule Delayed Release] 60 capsule 0     Sig: TAKE 1 CAPSULE BY MOUTH BEFORE BREAKFAST       GI Medications Refill Protocol Passed - 5/18/2020  9:01 AM        Passed - PCP or prescribing provider visit in last 12 or next 3 months.     Last office visit with prescriber/PCP: Visit date not found OR same dept: 3/10/2020 Kevin Mckinley MD OR same specialty: 3/10/2020 Kevin Mckinley MD  Last physical: Visit date not found Last MTM visit: Visit date not found   Next visit within 3 mo: Visit date not found  Next physical within 3 mo: Visit date not found  Prescriber OR PCP: Mannie Casper MD  Last diagnosis associated with med order: 1. Gastroesophageal reflux disease without esophagitis  - omeprazole (PRILOSEC) 20 MG capsule [Pharmacy Med Name: Omeprazole 20 MG Oral Capsule Delayed Release]; TAKE 1 CAPSULE BY MOUTH BEFORE BREAKFAST  Dispense: 60 capsule; Refill: 0    If protocol passes may refill for 12 months if within 3 months of last provider visit (or a total of 15 months).

## 2021-06-08 NOTE — TELEPHONE ENCOUNTER
Who is calling:  Patient Daughter   Reason for Call:  Caller states she has some concerns about her mothers trazodone medication she would like to discuss directly with Kevin Gorman nurse requested a call. Writer tried to explain about the trazodone Rx directions but caller is not understanding .  Date of last appointment with primary care: 03/10/20  Okay to leave a detailed message: No

## 2021-06-09 NOTE — TELEPHONE ENCOUNTER
Letter from Dr. Mckinley that states he saw her on 6/3/20 she is confident to appoint a power .

## 2021-06-09 NOTE — PROGRESS NOTES
Office Visit - Follow Up   Nimo Concepcion   89 y.o. female    Date of Visit: 7/20/2020    Chief Complaint   Patient presents with     Cough     slight ongoing cough      Dizziness     when moving head to quick, when up and walking the dizziness calms down     Snoring     daughter complains patient is snoring at night, and seems tired all day         Assessment and Plan   1. Lightheadedness  Complains of lightheadedness or dizziness when changing position from sitting to standing or from lying to standing due to poor baroreceptor reflex because of her age.  Advised to take it easy when she changes position from laying to sitting and standing.  Advised to take it easy when she sits up from laying position or stands up from sitting position.  Advised to take meclizine if her dizziness or lightheadedness persists.  - meclizine (ANTIVERT) 12.5 mg tablet; Take 1 tablet (12.5 mg total) by mouth 3 (three) times a day as needed for dizziness (lightheadedness).  Dispense: 30 tablet; Refill: 1    2. Cough  Complains of intermittent cough.  But mostly dry.  Coughs at night.  Denies any other symptoms like fever and shortness of breath.  Advised her cough is possibly due to allergy.    3. Snoring  Daughter noticed he snores.  But she is not bothered by it.  Patient continues to sleep.  Does not awaken from it.  Advised daughter there is nothing we should do for this since she is not bothered by it and sleeps well.    4. Essential hypertension  Controlled without medication.  Will continue to follow her blood pressure.    5. Takes dietary supplements  Take several dietary supplements.  Okay to continue Ocuvite for her vision.  - C,E,zinc,copper 11-omega3s-lut (OCUVITE ADULT 50 PLUS) 250-5-1 mg cap; Take 1 capsule by mouth daily.  Dispense: 90 capsule; Refill: 3    Follow up in  3 months.  Advised to fast next visit.       History of Present Illness   This 89 y.o. old female is here for follow-up.  Complains primarily of cough  which comes and goes.  Usually happens at night.  Cough is mostly dry.  No other associated symptoms like fever and shortness of breath.  Also complains of lightheadedness off and on.  Happens when she changes position from laying to sitting and from sitting to standing.  Dizziness or lightheadedness resolved spontaneously and  intaneously.  Has hypertension but now controlled without need for medication.  Overall, she feels well.    Review of Systems   A 12 point comprehensive review of systems was negative except as noted..     Medications, Allergies and Problem List   Reviewed and updated             Chief Complaint   Cough (slight ongoing cough ); Dizziness (when moving head to quick, when up and walking the dizziness calms down); and Snoring (daughter complains patient is snoring at night, and seems tired all day )       Patient Profile   Social History     Social History Narrative    . 4 grown children. Lives in a [address] apartment. Nonsmoker. Non alcohol drinker. Retired .        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       Medications and Allergies   Current Outpatient Medications   Medication Sig     CALCIUM & MAGNESIUM CARBONATES ORAL Take by mouth.     cholecalciferol, vitamin D3, (VITAMIN D3) 2,000 unit cap Take by mouth.     citalopram (CELEXA) 20 MG tablet Take 1.5 tablets (30 mg total) by mouth daily.     CRANBERRY EXTRACT ORAL Take by mouth.     famotidine  (PEPCID) 20 MG tablet Take 1 tablet (20 mg total) by mouth at bedtime.     KRILL OIL ORAL Take 1 tablet by mouth daily.     melatonin 1 mg Tab tablet Take 3 tablets (3 mg total) by mouth at bedtime as needed.     MULTIVIT &MINERALS/FERROUS FUM (MULTI VITAMIN ORAL) Take by mouth.     OMEGA-3/DHA/EPA/FISH OIL (FISH OIL-OMEGA-3 FATTY ACIDS) 300-1,000 mg capsule Take by mouth daily.     omeprazole (PRILOSEC) 20 MG capsule TAKE 1 CAPSULE BY MOUTH BEFORE BREAKFAST     potassium chloride (KLOR-CON) 10 MEQ CR tablet Take 3 tablets (30 mEq total) by mouth daily.     traZODone (DESYREL) 100 MG tablet Take 1-2 tablets (100-200 mg total) by mouth at bedtime.     verapamiL (VERELAN PM) 180 MG 24 hr capsule Take 1 capsule (180 mg total) by mouth daily. Take 180 mg by mouth daily.     C,E,zinc,copper 11-omega3s-lut (OCUVITE ADULT 50 PLUS) 250-5-1 mg cap Take 1 capsule by mouth daily.     meclizine (ANTIVERT) 12.5 mg tablet Take 1 tablet (12.5 mg total) by mouth 3 (three) times a day as needed for dizziness (lightheadedness).     No Known Allergies     Family and Social History   No family history on file.     Social History     Tobacco Use     Smoking status: Never Smoker     Smokeless tobacco: Never Used   Substance Use Topics     Alcohol use: No     Drug use: No        Physical Exam       Physical Exam  /60   Pulse 81   Wt 169 lb (76.7 kg)   LMP  (LMP Unknown)   SpO2 95%   BMI 29.01 kg/m    General appearance: alert, appears stated age, cooperative and no distress  Head: Normocephalic, without obvious abnormality, atraumatic  Throat: lips, mucosa, and tongue normal; teeth and gums normal  Neck: no adenopathy, no carotid bruit, no JVD, supple, symmetrical, trachea midline and thyroid not enlarged, symmetric, no tenderness/mass/nodules  Lungs: clear to auscultation bilaterally  Heart: regular rate and rhythm, S1, S2 normal, no murmur, click, rub or gallop  Abdomen: soft, non-tender; bowel sounds normal; no masses,  no  organomegaly  Extremities: extremities normal, atraumatic, no cyanosis or edema  Skin: Skin color, texture, turgor normal. No rashes or lesions  Neurologic: Grossly normal     Additional Information   Post Discharge Medication Reconciliation Status: discharge medications reconciled, continue medications without change      Kevin Mckinley MD  Internal Medicine  Contact me at [phone]     Additional Information   Current Outpatient Medications   Medication Sig     CALCIUM & MAGNESIUM CARBONATES ORAL Take by mouth.     cholecalciferol, vitamin D3, (VITAMIN D3) 2,000 unit cap Take by mouth.     citalopram (CELEXA) 20 MG tablet Take 1.5 tablets (30 mg total) by mouth daily.     CRANBERRY EXTRACT ORAL Take by mouth.     famotidine (PEPCID) 20 MG tablet Take 1 tablet (20 mg total) by mouth at bedtime.     KRILL OIL ORAL Take 1 tablet by mouth daily.     melatonin 1 mg Tab tablet Take 3 tablets (3 mg total) by mouth at bedtime as needed.     MULTIVIT &MINERALS/FERROUS FUM (MULTI VITAMIN ORAL) Take by mouth.     OMEGA-3/DHA/EPA/FISH OIL (FISH OIL-OMEGA-3 FATTY ACIDS) 300-1,000 mg capsule Take by mouth daily.     omeprazole (PRILOSEC) 20 MG capsule TAKE 1 CAPSULE BY MOUTH BEFORE BREAKFAST     potassium chloride (KLOR-CON) 10 MEQ CR tablet Take 3 tablets (30 mEq total) by mouth daily.     traZODone (DESYREL) 100 MG tablet Take 1-2 tablets (100-200 mg total) by mouth at bedtime.     verapamiL (VERELAN PM) 180 MG 24 hr capsule Take 1 capsule (180 mg total) by mouth daily. Take 180 mg by mouth daily.     C,E,zinc,copper 11-omega3s-lut (OCUVITE ADULT 50 PLUS) 250-5-1 mg cap Take 1 capsule by mouth daily.     meclizine (ANTIVERT) 12.5 mg tablet Take 1 tablet (12.5 mg total) by mouth 3 (three) times a day as needed for dizziness (lightheadedness).     No Known Allergies  Social History     Tobacco Use     Smoking status: Never Smoker     Smokeless tobacco: Never Used   Substance Use Topics     Alcohol use: No     Drug use:  No         Time: total time spent with the patient was 25 minutes of which >50% was spent in counseling and coordination of care

## 2021-06-09 NOTE — TELEPHONE ENCOUNTER
Left message to call back for: appointment  Information to relay to patient:  Help schedule a telephone visit  Rosanne Quarles CSS

## 2021-06-09 NOTE — TELEPHONE ENCOUNTER
Who is calling:  Lesli Peres patient daughter   Reason for Call:  Caller states she would like to discuss about patients power of  with provider or his nurse directly requested a call  From clinic ,caller is verbally aggressive to writer declined to give further information to writer .  Date of last appointment with primary care: 06/03/20  Okay to leave a detailed message: No

## 2021-06-09 NOTE — PROGRESS NOTES
2-28 Cg spoke with pt today, she is feeling well but concerned for her son in law that recently had surgery and is feeling worse now then he did before surgery. Patient is does not need any assistance filling her med box and states she has no needs at this time.

## 2021-06-09 NOTE — PROGRESS NOTES
"Nimo Concepcion is a 89 y.o. female who is being evaluated via a billable telephone visit.      The patient has been notified of following:     \"This telephone visit will be conducted via a call between you and your physician/provider. We have found that certain health care needs can be provided without the need for a physical exam.  This service lets us provide the care you need with a short phone conversation.  If a prescription is necessary we can send it directly to your pharmacy.  If lab work is needed we can place an order for that and you can then stop by our lab to have the test done at a later time.    Telephone visits are billed at different rates depending on your insurance coverage. During this emergency period, for some insurers they may be billed the same as an in-person visit.  Please reach out to your insurance provider with any questions.    If during the course of the call the physician/provider feels a telephone visit is not appropriate, you will not be charged for this service.\"    Patient has given verbal consent to a Telephone visit? Yes    What phone number would you like to be contacted at? [phone]    Patient would like to receive their AVS by AVS Preference: Mail a copy.    Additional provider notes: Telephone visit with Nimo through her daughter Lesli.  Has hypertension controlled by verapamil.  Has hypercholesterolemia and not on treatment anymore because of her advanced age.  Last lipids showed increased triglycerides but normal rest of lipids.  Has recurring major depression despite her antidepressants consisting of citalopram and trazodone.  I like her to see our psychiatry for her depression.  Wants to opt out in assigning her 1 daughter to have the power of .    Assessment/Plan:  1. Essential hypertension  Continue verapamil.    2. Essential Hypercholesterolemia  No need for treatment.    3. Moderate Recurrent Major Depression  Refer to psychiatry for her continuing " mood swings despite her antidepressant.  - Ambulatory referral to Psychiatry    Letter was written  stating she is mentally sound to make her own decision.      Phone call duration:  12 minutes    Sharron Main LPN

## 2021-06-10 NOTE — PROGRESS NOTES
4-24 CG called this patient but could not hear her on her phone - possibly phone problems on pt end, cg could only hear the TV    5-5-17 CG spoke to this patient today, she is on a waiting list with the [facility] in [place] but is open to speaking with Christopher Trujillo with [facility] and allowing him to assist her in finding an assisted living facility. CG sent Christopher an e-mail with contact information, this patient is expecting to hear from

## 2021-06-10 NOTE — TELEPHONE ENCOUNTER
Please call Monday. I called and informed her that you are out of the office until then.    Destini Delgado, CMA

## 2021-06-10 NOTE — PROGRESS NOTES
Assessment/ Plan     1. Essential hypertension with goal blood pressure less than 140/90  Patient presented today as she an elevated blood pressure reading at [facility] by her house.  Today her numbers are acceptable.  Discussed that the automated machines are sometimes not very accurate.  Would recommend that she check her blood pressure here in the clinic once a week over the next couple of weeks.  Will not make any changes in her medication based on today's readings.    2. Depression  Patient continues with feelings of depression, I think mostly related to isolation and loneliness.  She is still on the waiting list for the [facility].  She has been very reluctant to try medications in the past.  She is at the point that she would like to try something.  Will resend the prescription for venlafaxine that we have talked about in the past.  - venlafaxine 75 mg TR24; Take 75 mg by mouth daily.  Dispense: 30 each; Refill: 12      Subjective:       Nimo Concepcion is a 87 y.o. female who presents for blood pressure check.  Patient states she was at [facility] last evening and checked her blood pressure on one of their machines.  She got a 170/80 so called the nurse line.  They recommend she come in today.  She has been completely asymptomatic.  She has not had any chest pain, shortness of breath, or headaches.  She states she has been taking her blood pressure medication on a daily basis.  She also continues to struggle with insomnia.  This is been a long-standing issue that has been difficult to treat.  She has used Ambien in the past, but she would not be a candidate for this at her age.  She states her  had bad side effects from it so she does not want to take it anyways.  She takes 200 mg of trazodone at bedtime and still does not sleep well.  She also continues to feel very depressed.  This is been a long-standing issue that has been difficult to treat.  She is very isolated and lonely.  I encouraged her to look into  moving into an assisted living facility.  The only place she would like to go as the [facility].  She still on a waiting list.  She will check with them this week to see if she is moved up on the waiting list.  I think this would really help her to be around other people.  Otherwise she just sits at home and watches TV all day.  She feels like she is at a point where she just does not want to keep going on.  There is a lot of stressors related to her family that she feels like she cannot control.  She has been very reluctant to try medication in the past.  I sent prescriptions for her several times but she typically ends up not picking it up or starting it.  She would like to try at this time.    The following portions of the patient's history were reviewed and updated as appropriate: allergies, current medications, past family history, past medical history, past social history, past surgical history and problem list.    Review of Systems   A 12 point comprehensive review of systems was negative except as noted.      Current Outpatient Prescriptions   Medication Sig Dispense Refill     acetaminophen (TYLENOL) 500 MG tablet Take 500 mg by mouth every 6 (six) hours as needed for pain.       ANTIOX #11/OM3/DHA/EPA/LUT/HENRIK (OCUVITE ADULT 50+ ORAL) Take by mouth.       aspirin 81 mg chewable tablet Chew 81 mg daily.       CALCIUM & MAGNESIUM CARBONATES ORAL Take by mouth.       cholecalciferol, vitamin D3, (VITAMIN D3) 2,000 unit cap Take by mouth.       CRANBERRY EXTRACT ORAL Take by mouth.       cyanocobalamin, vitamin B-12, 2,500 mcg Tab Take 2,500 mcg by mouth daily.       MULTIVIT &MINERALS/FERROUS FUM (MULTI VITAMIN ORAL) Take by mouth.       OMEGA-3/DHA/EPA/FISH OIL (FISH OIL-OMEGA-3 FATTY ACIDS) 300-1,000 mg capsule Take by mouth daily.       pantoprazole (PROTONIX) 40 MG tablet TAKE ONE TABLET BY MOUTH TWICE A DAY. 180 tablet 2     potassium chloride (KLOR-CON) 10 MEQ CR tablet Take 3 tablets (30 mEq total) by  "mouth daily. 270 tablet 2     traZODone (DESYREL) 100 MG tablet Take 1-2 tablets (100-200 mg total) by mouth bedtime. 180 tablet 1     verapamil (VERELAN PM) 100 mg 24 hr capsule TAKE ONE CAPSULE BY MOUTH ONE TIME DAILY 90 capsule 2     alendronate (FOSAMAX) 70 MG tablet Take 1 tablet (70 mg total) by mouth every 7 days. Take in the morning on an empty stomach with a full glass of water 30 minutes before food 12 tablet 1     venlafaxine 75 mg TR24 Take 75 mg by mouth daily. 30 each 12     No current facility-administered medications for this visit.        Objective:      /78 (Patient Site: Right Arm, Patient Position: Sitting, Cuff Size: Adult Regular)  Pulse 80  Temp 97.7  F (36.5  C) (Oral)   Resp 22  Ht 5' 4\" (1.626 m)  Wt 157 lb (71.2 kg)  LMP  (LMP Unknown)  BMI 26.95 kg/m2      General appearance: alert, appears stated age and cooperative  Lungs: clear to auscultation bilaterally  Heart: regular rate and rhythm, S1, S2 normal, no murmur, click, rub or gallop  Extremities: 1+ edema  Pulses: 2+ and symmetric      No results found for this or any previous visit (from the past 168 hour(s)).       This note has been dictated using voice recognition software. Any grammatical or context distortions are unintentional and inherent to the software  "

## 2021-06-10 NOTE — TELEPHONE ENCOUNTER
Who is calling:  [facility].   Reason for Call:  I need to talk to Kevin Mckinley MD about this patients cognition.  Please have Kevin Mckinley MD return my call.   Date of last appointment with primary care: 7/20/20  Okay to leave a detailed message: Yes

## 2021-06-11 NOTE — PROGRESS NOTES
Cg spoke with Nimo today, inquired what her BP has been, she mentioned that it is difficult to get into the clinic to have it checked. I suggested she go to a clinic or hospital close to her home and have them check her BP and call me with the numbers, she liked that idea and will try going this week.   Nimo is still working with Christopher from [facility] to find an assisted living facility that she would be happy moving into.  I supplied my direct number again so she can call with any needs she may have.  Goals discussed and updated.

## 2021-06-12 NOTE — TELEPHONE ENCOUNTER
Dr. Walters,  Spoke with the patient's daughter, who is just about to leave with the sample.  If possible, she would like to have a prescription sent for her mother to start later today.  Please advise.  Thank you.  Andreia DALE, DAMIAN/CMT....................2:39 PM

## 2021-06-12 NOTE — TELEPHONE ENCOUNTER
Spoke with the patient's daughter and relayed message below from Dr. Mckinley.  She verbalized understanding and had no further questions at this time.  Andreia DALE, DAMIAN/JANENE....................9:00 AM

## 2021-06-12 NOTE — TELEPHONE ENCOUNTER
CrCl of 84 mL/min based on chemistries from 1/2019, which is a while ago. As long as she stays hydrated, Bactrim DS two times a day X 3 days

## 2021-06-12 NOTE — PROGRESS NOTES
Cg spoke with Nimo today, inquired what her BP has been, she has been checking it at home and the numbers have been good.   Nimo is still working with Christopher from [facility] to find an assisted living facility that she would be happy moving into, she has been contacted a couple of times to move into the [facility] in [place] but feels that she is not ready to leave her home yet.  I supplied my direct number again so she can call with any needs she may have.  Goals discussed and updated.

## 2021-06-12 NOTE — TELEPHONE ENCOUNTER
No urgent need to do labs on her. Just ask daughter to do virtual visit with me for follow up. Agree with patient it is not safe for her to go out considering her very advanced age.

## 2021-06-12 NOTE — TELEPHONE ENCOUNTER
Dr. Walters,  Spoke with the patient's daughter, Chrystal, and relayed message below.  She verbalized understanding and had no further questions at this time.  Order has been set up in this encounter for you to review.  Andreia DALE, DAMIAN/CMT....................2:26 PM

## 2021-06-12 NOTE — PROGRESS NOTES
Attempt 1: Care Guide called patient.  If this patient is returning my call, please transfer to Fior Lomas at ext [phone].

## 2021-06-12 NOTE — TELEPHONE ENCOUNTER
Spoke with the patient's daughter, Lesli, and relayed message below from Dr. Walters.  She verbalized understanding and had no further questions at this time.    Prescription has been set up for Dr. Walters to review per message below.  Andreia DALE CMA/CMT....................3:55 PM

## 2021-06-12 NOTE — TELEPHONE ENCOUNTER
Okay please let daughter know that once samples been provided, I am okay with providing a antibiotic prescription but this cannot be started until sample is dropped off.  Tentatively, we can just wait to the results come back and she can encourage hydration

## 2021-06-12 NOTE — TELEPHONE ENCOUNTER
Pt's daughter is calling. Consent on file.     Possible bladder infection again. Gets confused when this happens. No fever. No back pain or flank pain. Eating and drinking well. No vomiting or diarrhea. She gets upset easily. These symptoms are the same as in the past with UTI's.   She is unsure if her urine is cloudy, has blood in it, or has an odor. Urine does look reddish orange in color, for the past 3 days nowUTI. Denies eating anything to change the color. No new medications.   She does have a urine collection cup and is wondering if she can get an order for a UA/UC, drop it off and have antibiotics sent in.  I advised her that I would send a message to the provider and we would call her back today.  She verbalized understanding.    Reason for Disposition    All other urine symptoms    Additional Information    Negative: Shock suspected (e.g., cold/pale/clammy skin, too weak to stand, low BP, rapid pulse)    Negative: Sounds like a life-threatening emergency to the triager    Negative: Followed a genital area injury    Negative: Followed a genital area injury (penis, scrotum)    Negative: Vaginal discharge    Negative: Pus (white, yellow) or bloody discharge from end of penis    Negative: [1] Taking antibiotic for urinary tract infection (UTI) AND [2] female    Negative: [1] Taking antibiotic for urinary tract infection (UTI) AND [2] male    Negative: [1] Discomfort (pain, burning or stinging) when passing urine AND [2] pregnant    Negative: [1] Discomfort (pain, burning or stinging) when passing urine AND [2] postpartum (from 0 to 6 weeks after delivery)    Negative: [1] Discomfort (pain, burning or stinging) when passing urine AND [2] female    Negative: [1] Discomfort (pain, burning or stinging) when passing urine AND [2] male    Negative: Pain or itching in the vulvar area    Negative: Pain in scrotum is main symptom    Negative: Blood in the urine is main symptom    Negative: Symptoms arising from use of  a urinary catheter (Funes or Coude)    Negative: [1] Unable to urinate (or only a few drops) > 4 hours AND     [2] bladder feels very full (e.g., palpable bladder or strong urge to urinate)    Negative: [1] Decreased urination and [2] drinking very little AND [2] dehydration suspected (e.g., dark urine, no urine > 12 hours, very dry mouth, very lightheaded)    Negative: Patient sounds very sick or weak to the triager    Negative: Fever > 100.5 F (38.1 C)    Negative: Side (flank) or lower back pain present    Negative: [1] Can't control passage of urine (i.e., urinary incontinence) AND [2] new onset (< 2 weeks) or worsening    Negative: Urinating more frequently than usual (i.e., frequency)    Negative: Bad or foul-smelling urine    Negative: [1] Can't control passage of urine (i.e., urinary incontinence, wetting self) AND [2] present > 2 weeks    Negative: Urination is difficult to start (i.e., hesitancy) or straining    Negative: Dribbling (losing urine) just after finishing urination (i.e., post-void dribbling)    Negative: Has to get out of bed to urinate > 2 times a night (i.e., nocturia)    Protocols used: URINARY SYMPTOMS-A-    Roslyn Ayala RN   [facility] Nurse Advisor  11/09/20 at 1:49 PM
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
## 2021-06-13 ENCOUNTER — COMMUNICATION - HEALTHEAST (OUTPATIENT)
Dept: SCHEDULING | Facility: CLINIC | Age: 86
End: 2021-06-13

## 2021-06-13 NOTE — PROGRESS NOTES
Assessment/ Plan     1. Routine general medical examination at a health care facility  As far as healthcare maintenance, she is no longer doing colonoscopies.  She no longer is doing mammograms that she has had bilateral breast cancer with reconstruction.  We will update her flu shot today.  She is otherwise up-to-date on immunizations.  She has osteoporosis, but does not want to take Fosamax or any other medication, so no reason to repeat DEXA scan.  Patient does have some fall risks and we discussed that today.  She lives alone and is quite isolated.  She feels like she is not ready to move into an assisted living facility.  - Influenza High Dose, Seasonal  - Comprehensive Metabolic Panel  - HM2(CBC w/o Differential)  - Lipid Cascade    2. Osteoporosis  Patient has osteoporosis but does not want to take medications anymore.  She states she is good about calcium and vitamin D.  She is very sedentary and we discussed trying to get more activity.  She states she is limited by right knee pain.  - Vitamin D, Total (25-Hydroxy)    3. Moderate Recurrent Major Depression  Patient has had very difficult to treat depression.  She has not tolerated most medications.  I think this is mainly from loneliness and isolation.  She states her daughter checks in with her several times per day.  She is not interested in trying any other medications.    4. Chronic Reflux Esophagitis  Stable on current medications.  Refill sent.  - pantoprazole (PROTONIX) 40 MG tablet; TAKE ONE TABLET BY MOUTH TWICE A DAY.  Dispense: 180 tablet; Refill: 3    5. Essential hypertension  Stable on current medications.  - verapamil (VERELAN PM) 100 mg 24 hr capsule; TAKE ONE CAPSULE BY MOUTH ONE TIME DAILY  Dispense: 90 capsule; Refill: 2    6. Malignant neoplasm of female breast  Past history of bilateral breast cancer with reconstruction.    7. Primary insomnia  Fairly well controlled with trazodone.  Ambien has worked better for her in the past, but due  to her age, she is not a candidate.  - traZODone (DESYREL) 100 MG tablet; Take 1-2 tablets (100-200 mg total) by mouth at bedtime.  Dispense: 180 tablet; Refill: 1    8. Cerumen impaction  Patient had left-sided cerumen impaction today.  This resolved with irrigation today    Subjective:     Nimo Concepcion is a 87 y.o. female who presents for an annual exam.  Overall, patient is doing okay.  She still is living alone and is quite sedentary.  She is at fall risks and we discussed this today.  She does wear a bracelet that she can push a button if she falls and can get up.  She states 1 of her daughters checks in with her several times per day.  She does have railings in the house, but not bars around the toilet or bathtub.  She does have a walker at home that she sometimes uses.  She denies any falls since we last saw her.  Her main complaint today is that she cannot hear very well out of her left ear.  She states overall her hearing is declined, but this is worse in the left.  No pain or pressure.  She does continue with depression which has been very difficult to treat.  She has had intolerances to multiple medications.  I think most of her depression is from loneliness and isolation.  She states she is really not ready to move into the cottages.  They have an opening for her but she declined it.  She does not want to give up her independence.  She still driving.  She feels that she is still safe to be driving.  She has osteoporosis but does not want to take any of the medications for it.  Therefore, will not recheck a DEXA today.  She is otherwise up-to-date on her immunizations.    Healthy Habits:   Regular Exercise: No  Sunscreen Use: No  Healthy Diet: Yes  Dental Visits Regularly: Yes  Seat Belt: Yes  Sexually active: No  Self Breast Exam Monthly:N/A  Colonoscopy: No  Lipid Profile: Yes  Glucose Screen: Yes  Prevention of Osteoporosis: No  Last Dexa: No        Immunization History   Administered Date(s)  Administered     DT (pediatric) 06/13/2005     Influenza V1c4-12, 12/21/2009     Influenza high dose, seasonal 09/23/2014, 09/10/2015, 10/09/2017     Influenza, inj, historic 10/10/2007, 10/15/2008, 09/23/2009, 10/05/2010, 09/06/2012     Influenza, seasonal,quad inj 6-35 mos 10/05/2011     Influenza,seasonal quad, PF 10/04/2013     Pneumo Conj 13-V (2010&after) 09/10/2015     Pneumo Polysac 23-V 06/13/2005, 11/09/2011     Td, historic 06/13/2005     Tdap 01/31/2013     ZOSTER 12/19/2013     Immunization status: up to date and documented.     Gynecologic History  No LMP recorded (lmp unknown). Patient is postmenopausal.  Contraception: none    Last mammogram: years, mastectomy.    OB History   No data available       Current Outpatient Prescriptions   Medication Sig Dispense Refill     acetaminophen (TYLENOL) 500 MG tablet Take 500 mg by mouth every 6 (six) hours as needed for pain.       ANTIOX #11/OM3/DHA/EPA/LUT/HENRIK (OCUVITE ADULT 50+ ORAL) Take by mouth.       aspirin 81 mg chewable tablet Chew 81 mg daily.       CALCIUM & MAGNESIUM CARBONATES ORAL Take by mouth.       cholecalciferol, vitamin D3, (VITAMIN D3) 2,000 unit cap Take by mouth.       CRANBERRY EXTRACT ORAL Take by mouth.       cyanocobalamin, vitamin B-12, 2,500 mcg Tab Take 2,500 mcg by mouth daily.       MULTIVIT &MINERALS/FERROUS FUM (MULTI VITAMIN ORAL) Take by mouth.       OMEGA-3/DHA/EPA/FISH OIL (FISH OIL-OMEGA-3 FATTY ACIDS) 300-1,000 mg capsule Take by mouth daily.       pantoprazole (PROTONIX) 40 MG tablet TAKE ONE TABLET BY MOUTH TWICE A DAY. 180 tablet 3     potassium chloride (KLOR-CON) 10 MEQ CR tablet Take 3 tablets (30 mEq total) by mouth daily. 270 tablet 2     traZODone (DESYREL) 100 MG tablet Take 1-2 tablets (100-200 mg total) by mouth at bedtime. 180 tablet 1     verapamil (VERELAN PM) 100 mg 24 hr capsule TAKE ONE CAPSULE BY MOUTH ONE TIME DAILY 90 capsule 2     alendronate (FOSAMAX) 70 MG tablet Take 1 tablet (70 mg total) by  "mouth every 7 days. Take in the morning on an empty stomach with a full glass of water 30 minutes before food 12 tablet 1     venlafaxine 75 mg TR24 Take 75 mg by mouth daily. 30 each 12     No current facility-administered medications for this visit.      No past medical history on file.  No past surgical history on file.  Review of patient's allergies indicates no known allergies.  No family history on file.  Social History     Social History     Marital status:      Spouse name: N/A     Number of children: N/A     Years of education: N/A     Occupational History     Not on file.     Social History Main Topics     Smoking status: Never Smoker     Smokeless tobacco: Not on file     Alcohol use Not on file     Drug use: Not on file     Sexual activity: Not on file     Other Topics Concern     Not on file     Social History Narrative       Review of Systems  General:  Denies problems  Eyes:  Denies problems  Ears/Nose/Throat:  Denies problems  Cardiovascular:  Denies problems  Respiratory:  Denies problems  Gastrointestinal:  Denies problems  Genitourinary:  Denies problems  Musculoskeletal:  Denies problems  Skin:  Denies problems  Neurologic:  Denies problems  Psychiatric:  Denies problems  Endocrine:  Denies problems  Heme/Lymphatic:  Denies problems  Allergic/Immunologic:  Denies problems    Objective:      Vitals:    10/09/17 0910   BP: 130/80   Pulse: 76   Resp: 18   Temp: 97.8  F (36.6  C)   TempSrc: Oral   Weight: 154 lb (69.9 kg)   Height: 5' 4\" (1.626 m)       Physical Exam:  General Appearance: Alert, cooperative, no distress, appears stated age  Head: Normocephalic, without obvious abnormality, atraumatic  Eyes: PERRL, conjunctiva/corneas clear, EOM's intact  Ears: Right TM and canal are normal, left is blocked with cerumen. Resolved with irrigation  Nose: Nares normal, septum midline,mucosa normal, no drainage  Throat: Lips, mucosa, and tongue normal; dentures  Neck: Supple, symmetrical, trachea " midline, no adenopathy; thyroid: not enlarged, symmetric, no tenderness/mass/nodules; no carotid bruit  Back: Symmetric, no curvature, ROM normal, no CVA tenderness  Lungs: Clear to auscultation  Breasts: Bilateral reconstruction with implants.  No abnormal tissue palpated.    Heart: Regular rate and rhythm, S1 and S2 normal, no murmur, rub, or gallop, Abdomen: Soft, non-tender, bowel sounds active all four quadrants,  no masses, no organomegaly  Extremities: Extremities normal, atraumatic, no cyanosis or edema  Skin: Skin color, texture, turgor normal, no rashes or lesions  Lymph nodes: Cervical, supraclavicular, and axillary nodes normal  Neurologic: Normal        The following high BMI interventions were performed this visit: encouragement to exercise and dietary needs education    Results for orders placed or performed in visit on 10/09/17   HM2(CBC w/o Differential)   Result Value Ref Range    WBC 7.4 4.0 - 11.0 thou/uL    RBC 4.33 3.80 - 5.40 mill/uL    Hemoglobin 13.8 12.0 - 16.0 g/dL    Hematocrit 41.5 35.0 - 47.0 %    MCV 96 80 - 100 fL    MCH 31.7 27.0 - 34.0 pg    MCHC 33.2 32.0 - 36.0 g/dL    RDW 10.7 (L) 11.0 - 14.5 %    Platelets 175 140 - 440 thou/uL    MPV 7.6 7.0 - 10.0 fL       Katarina Colon MD    This note has been dictated using voice recognition software. Any grammatical or context distortions are unintentional and inherent to the software

## 2021-06-13 NOTE — TELEPHONE ENCOUNTER
Daughter calling - consent on file.    Says patient had UTI a couple weeks ago. Symptoms at that time included - patient being unsually angry and yelling at people.  She was prescribed antibiotics and symptoms resolved.      Daughter says today about 3:00 pm she was yelling again and she is concerned the UTI could be coming back.  She also said she is sore when she urinates.    No fever.  Daughter says patient is not a danger to herself or others.    Triaged to disposition of See PCP within 24 hours.  Says she scheduled an appointment for tomorrow.  Advised to call back if fever occurs or symptoms worsen.      Karen Orellana, RN  Triage Nurse Advisor    COVID 19 Nurse Triage Plan/Patient Instructions    Please be aware that novel coronavirus (COVID-19) may be circulating in the community. If you develop symptoms such as fever, cough, or SOB or if you have concerns about the presence of another infection including coronavirus (COVID-19), please contact your health care provider or visit [email or web address].     Disposition/Instructions    In-Person Visit with provider recommended. Reference Visit Selection Guide.    Thank you for taking steps to prevent the spread of this virus.  o Limit your contact with others.  o Wear a simple mask to cover your cough.  o Wash your hands well and often.    Resources    [facility]: About COVID-19: [email or web address]    CDC: What to Do If You're Sick: www.cdc.gov/coronavirus/2019-ncov/about/steps-when-sick.html    CDC: Ending Home Isolation: www.cdc.gov/coronavirus/2019-ncov/hcp/disposition-in-home-patients.html     CDC: Caring for Someone: www.cdc.gov/coronavirus/2019-ncov/if-you-are-sick/care-for-someone.html     [facility]: Interim Guidance for Hospital Discharge to Home: www.health.[facility].[place]/diseases/coronavirus/hcp/hospdischarge.pdf    [facility] clinical trials (COVID-19 research studies): [email or web address]     Below are the  COVID-19 hotlines at the [place] [facility] ([facility]). Interpreters are available.   o For health questions: Call 271-963-7195 or 1-798.890.3382 (7 a.m. to 7 p.m.)  o For questions about schools and childcare: Call 372-633-0967 or 1-186.588.2334 (7 a.m. to 7 p.m.)       Reason for Disposition    All other patients with painful urination(Exception: [1] EITHER frequency or urgency AND [2] has on-call doctor)    Additional Information    Negative: Shock suspected (e.g., cold/pale/clammy skin, too weak to stand, low BP, rapid pulse)    Negative: Sounds like a life-threatening emergency to the triager    Negative: Followed a genital area injury    Negative: Followed a genital area injury (penis, scrotum)    Negative: Vaginal discharge    Negative: Pus (white, yellow) or bloody discharge from end of penis    Negative: [1] Taking antibiotic for urinary tract infection (UTI) AND [2] female    Negative: [1] Taking antibiotic for urinary tract infection (UTI) AND [2] male    Negative: [1] Discomfort (pain, burning or stinging) when passing urine AND [2] pregnant    Negative: [1] Discomfort (pain, burning or stinging) when passing urine AND [2] postpartum (from 0 to 6 weeks after delivery)    Negative: [1] Discomfort (pain, burning or stinging) when passing urine AND [2] male    Negative: Pain or itching in the vulvar area    Negative: Pain in scrotum is main symptom    Negative: Blood in the urine is main symptom    Negative: Symptoms arising from use of a urinary catheter (Funes or Coude)    Negative: [1] Unable to urinate (or only a few drops) > 4 hours AND     [2] bladder feels very full (e.g., palpable bladder or strong urge to urinate)    Negative: [1] Decreased urination and [2] drinking very little AND [2] dehydration suspected (e.g., dark urine, no urine > 12 hours, very dry mouth, very lightheaded)    Negative: Patient sounds very sick or weak to the triager    Negative: Fever > 100.5 F (38.1 C)    [1]  Discomfort (pain, burning or stinging) when passing urine AND [2] female    Negative: Shock suspected (e.g., cold/pale/clammy skin, too weak to stand, low BP, rapid pulse)    Negative: Sounds like a life-threatening emergency to the triager    Negative: Followed a genital area injury    Negative: Taking antibiotic for urinary tract infection (UTI)    Negative: Pregnant    Negative: Postpartum (from 0 to 6 weeks after delivery)    Negative: [1] Unable to urinate (or only a few drops) > 4 hours AND [2] bladder feels very full (e.g., palpable bladder or strong urge to urinate)    Negative: Vomiting    Negative: Patient sounds very sick or weak to the triager    Negative: [1] SEVERE pain with urination  (e.g., excruciating) AND [2] not improved after 2 hours of pain medicine and Sitz bath    Negative: Fever > 100.5 F (38.1 C)    Negative: Side (flank) or lower back pain present    Negative: Diabetes mellitus or weak immune system (e.g., HIV positive, cancer chemo, splenectomy, organ transplant, chronic steroids)    Negative: Bedridden (e.g., nursing home patient, CVA, chronic illness, recovering from surgery)    Negative: Artificial heart valve or artificial joint    Protocols used: URINATION PAIN - FEMALE-A-AH, URINARY SYMPTOMS-A-AH

## 2021-06-13 NOTE — PROGRESS NOTES
Patient stated, she hasn't been able to check her BP for a while. I asked if when she thinks she last checked it. Sounded like possibly June.  I asked if she has had any symptoms, she said no. I asked if she is able to tell if she feels her BP is abnormal, she said no. Patient has an appointment to see her PCP coming up.     Nimo, has had the same goal for a while.  Today, we discussed what maybe holding her back from moving into [facility]. She says, her feel is lose of independence. When we talked further she said, she had a tour of the place and its nice. It would be a side by side home.  She states, she doesn't know if she would be able to take her car because their is no attached garage, the cost of the place is more than mine. My house is around $780 and they want something around $800. I'm not sure if there was additional out of pocket. She does admit its hard for her to do laundry because of the split level.  She mentioned that she got an injection in her need and it helped a little. It sounds like she knows what she needs to do, but isn't ready to do it.     PCAM scheduled for 10/24 with Roslyn for a reassessment.

## 2021-06-13 NOTE — TELEPHONE ENCOUNTER
Called pt's daughter and asked how her mom was feeling. Daughter states she is feeling okay and have no urinary tract infection symptoms.

## 2021-06-13 NOTE — PROGRESS NOTES
Called patient to remind her of appointment with Roslyn CAMERON on 10/24 @ 1pm. Patient asked if the appointment can be rescheduled. She is now scheduled for Nov 28th @ 2pm.     Goals not discussed today

## 2021-06-13 NOTE — PROGRESS NOTES
Office Visit - Follow Up   Nimo Concepcion   89 y.o. female    Date of Visit: 12/3/2020    Chief Complaint   Patient presents with     Altered Mental Status     thinks she may have a UTI again        Assessment and Plan   1. Acute cystitis without hematuria  Daughter thinks she has recurrence of her UTI. Has behavioral changes, shouting, upset and angry in the last 2 days. Only complains of some pelvic pains but denies dysuria, frequency. Also does not have fever.  Will check her urine and treat her empirically with cipro for 5 days.   - ciprofloxacin HCl (CIPRO) 250 MG tablet; Take 1 tablet (250 mg total) by mouth 2 (two) times a day for 5 days.  Dispense: 10 tablet; Refill: 0  - Urinalysis-UC if Indicated    2. Change in behavior  Has altered behavior again in the last 2 days. Similar presenting symptoms when she had UTI early this month. Was treated for her UTI with antibiotic and her change of behavior resolved. Will treat her as having UTI.     3. Essential hypertension  Controlled. Continue verapamil.   - HM2(CBC w/o Differential)  - Basic Metabolic Panel    Follow up in  4 months.      History of Present Illness   This 89 y.o. old female was noted to have behavior changes in the form of shouting, being upset and angry for no reasons. Had similar symptoms early this month and found to have UTI. Was treated with antibiotic and her behavior changes resolved. Only complains of pelvic pains but denies dysuria, frequency and urinary leaks. Does not have fever. Has hypertension controlled y verapamil.      Review of Systems   A 12 point comprehensive review of systems was negative except as noted..     Medications, Allergies and Problem List   Reviewed and updated             Chief Complaint   Altered Mental Status (thinks she may have a UTI again)       Patient Profile   Social History     Social History Narrative    . 4 grown children. Lives in a senior complex apartment. Nonsmoker. Non alcohol drinker.  Retired .        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       Medications and Allergies   Current Outpatient Medications   Medication Sig     C,E,zinc,copper 11-omega3s-lut (OCUVITE ADULT 50 PLUS) 250-5-1 mg cap Take 1 capsule by mouth daily.     CALCIUM & MAGNESIUM CARBONATES ORAL Take by mouth.     cholecalciferol, vitamin D3, (VITAMIN D3) 2,000 unit cap Take by mouth.     citalopram (CELEXA) 20 MG tablet Take 1.5 tablets (30 mg total) by mouth daily.     CRANBERRY EXTRACT ORAL Take by mouth.     famotidine (PEPCID) 20 MG tablet Take 1 tablet (20 mg total) by mouth at bedtime.     KRILL OIL ORAL Take 1 tablet by mouth daily.     meclizine (ANTIVERT) 12.5 mg tablet Take 1 tablet (12.5 mg total) by mouth 3 (three) times a day as needed for dizziness (lightheadedness).     melatonin 1 mg Tab tablet Take 3 tablets (3 mg total) by mouth at bedtime as needed.     MULTIVIT &MINERALS/FERROUS FUM (MULTI VITAMIN ORAL) Take by mouth.     OMEGA-3/DHA/EPA/FISH OIL (FISH OIL-OMEGA-3 FATTY ACIDS) 300-1,000 mg capsule Take by mouth daily.     omeprazole (PRILOSEC) 20 MG capsule TAKE 1 CAPSULE BY MOUTH BEFORE BREAKFAST     potassium chloride (KLOR-CON) 10 MEQ CR tablet Take 3 tablets (30 mEq total) by mouth daily.     traZODone (DESYREL) 100 MG tablet Take 1-2 tablets (100-200 mg total) by mouth at bedtime.     verapamiL (VERELAN PM) 180 MG 24 hr capsule Take 1 capsule (180 mg total) by mouth daily. Take  "180 mg by mouth daily.     ciprofloxacin HCl (CIPRO) 250 MG tablet Take 1 tablet (250 mg total) by mouth 2 (two) times a day for 5 days.     No Known Allergies     Family and Social History   No family history on file.     Social History     Tobacco Use     Smoking status: Never Smoker     Smokeless tobacco: Never Used   Substance Use Topics     Alcohol use: No     Drug use: No        Physical Exam       Physical Exam  /80 (Patient Site: Left Arm, Patient Position: Sitting, Cuff Size: Adult Large)   Pulse (!) 103   Ht 5' 4\" (1.626 m)   LMP  (LMP Unknown)   SpO2 97%   BMI 29.01 kg/m    General appearance: alert, appears stated age, cooperative, no distress and wheelchair bound, looks younger than her age of 89   Head: Normocephalic, without obvious abnormality, atraumatic  Throat: lips, mucosa, and tongue normal; teeth and gums normal  Neck: no adenopathy, no carotid bruit, no JVD, supple, symmetrical, trachea midline and thyroid not enlarged, symmetric, no tenderness/mass/nodules  Lungs: clear to auscultation bilaterally  Abdomen: soft, non-tender; bowel sounds normal; no masses,  no organomegaly  Extremities: extremities normal, atraumatic, no cyanosis or edema     Additional Information   Post Discharge Medication Reconciliation Status: discharge medications reconciled, continue medications without change      Kevin Mckinley MD  Internal Medicine  Contact me at [phone]     Additional Information   Current Outpatient Medications   Medication Sig     C,E,zinc,copper 11-omega3s-lut (OCUVITE ADULT 50 PLUS) 250-5-1 mg cap Take 1 capsule by mouth daily.     CALCIUM & MAGNESIUM CARBONATES ORAL Take by mouth.     cholecalciferol, vitamin D3, (VITAMIN D3) 2,000 unit cap Take by mouth.     citalopram (CELEXA) 20 MG tablet Take 1.5 tablets (30 mg total) by mouth daily.     CRANBERRY EXTRACT ORAL Take by mouth.     famotidine (PEPCID) 20 MG tablet Take 1 tablet (20 mg total) by mouth at bedtime.     KRILL " OIL ORAL Take 1 tablet by mouth daily.     meclizine (ANTIVERT) 12.5 mg tablet Take 1 tablet (12.5 mg total) by mouth 3 (three) times a day as needed for dizziness (lightheadedness).     melatonin 1 mg Tab tablet Take 3 tablets (3 mg total) by mouth at bedtime as needed.     MULTIVIT &MINERALS/FERROUS FUM (MULTI VITAMIN ORAL) Take by mouth.     OMEGA-3/DHA/EPA/FISH OIL (FISH OIL-OMEGA-3 FATTY ACIDS) 300-1,000 mg capsule Take by mouth daily.     omeprazole (PRILOSEC) 20 MG capsule TAKE 1 CAPSULE BY MOUTH BEFORE BREAKFAST     potassium chloride (KLOR-CON) 10 MEQ CR tablet Take 3 tablets (30 mEq total) by mouth daily.     traZODone (DESYREL) 100 MG tablet Take 1-2 tablets (100-200 mg total) by mouth at bedtime.     verapamiL (VERELAN PM) 180 MG 24 hr capsule Take 1 capsule (180 mg total) by mouth daily. Take 180 mg by mouth daily.     ciprofloxacin HCl (CIPRO) 250 MG tablet Take 1 tablet (250 mg total) by mouth 2 (two) times a day for 5 days.     No Known Allergies  Social History     Tobacco Use     Smoking status: Never Smoker     Smokeless tobacco: Never Used   Substance Use Topics     Alcohol use: No     Drug use: No         Time: total time spent with the patient was 25 minutes of which >50% was spent in counseling and coordination of care

## 2021-06-14 NOTE — PROGRESS NOTES
RN Recommendations and Referrals  [facility] : to discuss eligiibilty for waivered services (housekeeping and meal service)  Prescription for walker  Prescription for blood pressure cuff  Complete updated Advanced Directive  Dietary counseling regarding elevated triglycerides    Action Plan    RN Will  Schedule [facility]  consult (DONE at [facility] visit 11/28/17  Provide CG with information regarding dietary interventions for elevated triglycerides to mail to patient  Discuss patient's BP parameters with PCP and add to goals  Will not add the patient to [facility] tracking list  Be available to the patient as nursing needs arise    Care Guide Will    Review new goals with patient at first outreach call  Please coordinate getting a prescription for a walker and a home blood pressure cuff for patient   Please send patient [employer] packet (and volunteer info, she will need assistance in completing) to patient via mail  Please send patient dietary information regarding triglycerides (CCC RN will send CG links to information to be printed) via mail  Standard outreach      Goals        Patient Stated      I need to pay more attention to my blood pressure (pt-stated)            Action steps to achieve this goal  1.  I will ask my Care Guide to coordinate with my doctor to get me a more user friendly and accurate blood pressure cuff to have at home  2.  I will check my blood pressure with my new cuff at least three times/week and log the results.    3.  I will report any trend of elevated readings (two or more high readings in a given week)  (parameters to be set by MD) to my doctor's office via phone.    4.  I will continue to take my anti-hypertensive medications regularly    Date goal set:  11/28/17        I need to update my Advance Directive  (pt-stated)            Action steps to achieve this goal   1.  I will review the Advance Directive information (Honoring Choices) provided to me by my Care Guide  2.  I  will ask my children's assistance in completing a new Advance Directive   3.  I will use the [employer]oring [employer] volunteers if I need more assistance in completing a new Advance Directive.   4.  I will bring my new Advance Directive to my next office visit so it can be scanned into my electronic record.     Date goal set:  11/28/17        I want information on how to reduce my triglycerides (pt-stated)            Action steps to achieve this goal  1.  I will ask the [facility] RN to provide me with written information on ways I can reduce my triglycerides on my own without medications (DONE at [facility] 11/28/17)  2.  I will review written materials provided to me by the Care Coordination team regarding reducing my triglycerides naturally.  3.  I will work hard to reduce my intake of sugars and carbohydrates and add more fish and other proteins to my diet  4.  I will work hard to reduce my intake of fried/fast food (saturated fats)    Date goal set:  11/28/17        I want to find a new place to live.  (pt-stated)            Action steps to achieve this goal    1.  I will consider moving into [facility]. I have been called 2 times already to move in but I feel I am not ready to leave my house yet. (continuous)  2.  I will working with Christopher from [facility] in assisting me in getting into [facility]. I haven't been ready to move forward      Updated: 11/28/17    Date goal set:  9/10/15  Date goal reassessed:  11/28/17        I would like my own walker (pt-stated)            Action steps to achieve this goal  1.  I will ask my CG to assist in coordinating with my PCP to get me a walker covered by insurance.   2.  I will use my walker around the house so I remain as safe as possible.     Date goal set:  11/28/17              Clinic Care Coordination RN Assessed Needs  Patient Centered Assessment Method-PCAM TOTAL SCORE: 22 (11/28/2017  3:29 PM)  Level 1:  A score of 12-24 indicates that the patient has very  "little to no initial need for RN or SW intervention.  Standard care guide outreach/support should be appropriate at the discretion of the .  The care guide can reach out to the RN/SW as needed for support or with new concerns.        PCAM (Patient Centered Assessment Method) HEALTH AND WELL-BEING  Physical Health Concerns:  (c/o pain/stiffness in her knees. 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 She has had injections in the past. )  Other Physical Health Concerns:: hx of HTN (does not check BP at home); osteoporosis (pt declines to take medications).   RN Assessment: Physical Health Needs: Mild vague physical symptoms or problems- but do not impact on daily life or are not of concern to client  RN Assessment: Physical Health Problems: Mild impact on mental well-being e.g. \"feeling fed-up\", \"reduced enjoyment\" (patient is concerned she cannot be as active as she once was due to her knees and slow mobility. )  Mental Health Concerns: Depression  Other Mental Health Concerns:: describes herself as apathetic and just \"no longer caring.\" She denies severe depressive symptoms and is able to go about her day.  She worries about her children, but denies anxiety.   RN Assessment:Other Mental Well-Being Concern: Mild problems- don't interfere with function  Lifestyle/Habit Concerns: Diet, Exercise/Activity (poor diet. She eats sugary foods and a fair amount of fast food. Offered getting her a meal delivery services such as Meals on Wheels, but she declines at this time. Pt was briefly instructed on dietary measures to decrease triglycerides. )  Other Lifestyle/Habit Concerns:: very sedentary lifestyle. Used to have a stationary bike, but she gave it to her daughter. She no longer gardens or takes walks.   RN Assessment: Lifestyle Behaviors: Some mild concern of potential negative impact on well-being  SOCIAL ENVIRONMENT  Home Environment Concerns: Denies concerns that require further " "investigation (lives alone in a split-entry home with two stairs to enter. Half flight of stairs up or down. She lives mainly on the upper level with laundry in the lower level. She manages the stairs but admits doing laundry is \"tough\" She does not have double railins)  Other Home Environment Concerns:: She has a suction cup safety bar in the shower.  She uses a walker around the house occasionally. THis is borrowed from a friend and she is interested in her own. Will ask MD for prescription. She states she has fallen a few times in the past year.  She does have a Life Alert bracelt and necklace.   RN Assessment: Home Environment: Consistently safe, supportive, stable, no identified problems (patient owns the home and has lived there for 19 years. She feels very safe in her neighborhood and is friends with most of her neighbors. )  Daily Activities Concerns:  (she does not get out much, \"maybe once a week.\"  Usually fills her time by watching TV.  She is independent with ADLs, but does have some concerns about housekeeping.  Her oldest dtr has offered to help, but she has not accepted.  )  RN Assessment: Daily Activites: Adequate participation with social networks  Social Network Concerns: Socially isolated (keeps to herself and stays at home most of the time. Is not interested in activities such as volunteering. )  Other Social Network Concerns:: Supportive family although she states her oldest daughter is \"preoccupied with possibly losing me.\"  She checks in with the patient by phone several times/day. She has three other children, her oldest son has Bipolar depression, but is doing fine on medications. She describes her oldest dtr, Lesli as her main support.   RN Assessment: Social Network: Restricted participation with some degree of social isolation  Financial Status and Service Concerns: Denies concerns that require further investigation (She lives on about $2000K/mo between SS and pensions from both her " and her . She states she is doing fine financially and her kids pitch in for groceries if needed. )  RN Assessment: Financial Resources: Financially secure, some resource challenges  HEALTH LITERACY AND COMMUNICATION  Understanding of Health and Wellbeing Concerns: Reasonable to good understanding but does not feel able to engage with advise at this time  Other Undertanding of Health and Wellbeing Concerns:: seems to have a fair understanding of health problems. Did not seem to know about her osteoporosis and had to be coached about her Htn.   RN Assessment: Health Literacy: Reasonable to good understanding but do not feel able to engage with advice at this time  Engagement Concerns: Some difficulties in communication with or without moderate barriers, Adequate communication, with or without minor barriers (pt is [place], but understands when speaker is speaking slow with increased volume )  Other Engagement Concerns:: pt appears slightly flat. She is concerned about her health maintenance, but not overly motivated to make lifestyle changes (exercise and diet).   RN Assessment: Engagement: Adequate communication, with or without minor barriers  Barriers to Compliance with Medical Recommendations: Mental Illness  Other Barriers to Compliance with Medical Recommendations Concerns:: lacking motivation due to depression. Pt reports compliance with medications and keeping appointments.   SERVICE COORDINATION  Other Services: Other care/services not required at this time (pt denies need for in-home services. Could benefit from housekeeping help and meal service.  Has investigated Assisted Living, but does not feel ready to make the change yet. She has the necessary resources if needed. )  Coordination of Services: All required care/services in place and well coordinated  PCAM TOTAL SCORE: 22      Emergency Plan  Preventing Falls    Having a health problem can make you more likely to fall. Taking certain kinds of  medicines may also increase your risk of falls. So, improving your health can help you avoid a fall. Work with your healthcare provider to manage health problems and to review your medicines. If you have your health under control, your risk of falling is lessened.    When to call your healthcare provider  Be sure to call your healthcare provider if you fall. Also call if you have any of these signs or symptoms (someone else may need to point them out to you):    Feeling lightheaded or dizzy more than once a day    Losing your balance often or feeling unsteady on your feet    Feeling numbness in your legs or feet, or noticing a change in the way you walk    Having a steady decline in your memory or mental sharpness    Depression  Everyone feels down at times. The blues are a natural part of life. But an unhappy period that s intense or lasts for more than a couple of weeks can be a sign of depression. Depression is a serious illness. It can disrupt the lives of family and friends. If you know someone you think may be depressed, find out what you can do to help.    Know the serious signals  Warning signals for suicide include:    Threats or talk of suicide    Statements such as  I won t be a problem much longer  or  Nothing matters     Giving away possessions or making a will or  arrangements    Buying a gun or other weapon    Sudden, unexplained cheerfulness or calm after a period of depression  If you notice any of these signs, get help right away. Call a health care professional, mental health clinic, or suicide hotline and ask what action to take. In an emergency, don t hesitate to call the police.    [facility] Mental Health Crisis Lines:  [facility] [phone]  [facility] [phone]  [facility] [phone]  [facility] [phone]  [facility], Adults [phone]  [facility], Children [phone]  [facility], Adults [phone]  [facility], Children  [phone]    High Blood Pressure (Hypertension)  You have been diagnosed with high blood pressure (also called hypertension). This means the force of blood against your artery walls is too strong. It also means your heart is working hard to move blood. High blood pressure usually has no symptoms, but over time, it can damage your heart, blood vessels, eyes, kidneys, and other organs. With help from your doctor, you can manage your blood pressure and protect your health.  Taking medications    Learn to take your own blood pressure. Keep a record of your results. Ask your doctor which readings mean that you need medical attention.    Take your blood pressure medication exactly as directed. Don t skip doses. Missing doses can cause your blood pressure to get out of control.    Avoid medications that contain heart stimulants, including over-the-counter drugs. Check for warnings about high blood pressure on the label.    Check with your doctor before taking a decongestant. Some decongestants can worsen high blood pressure.  Lifestyle changes    Maintain a healthy weight. Get help to lose any extra pounds.    Cut back on salt.  o Limit canned, dried, packaged, and fast foods.  o Don t add salt to your food at the table.  o Season foods with herbs instead of salt when you cook.    Follow the DASH (Dietary Approaches to Stop Hypertension) eating plan. This plan recommends vegetables, fruits, whole gains, and other heart healthy foods.    Begin an exercise program. Ask your doctor how to get started. The American Heart Association recommends aerobic exercise 3 to 4 times a week for an average of 40 minutes at a time, with your doctor's approval. Simple activities like walking or gardening can help.    Break the smoking habit. Enroll in a stop-smoking program to improve your chances of success. Ask your health care provider about programs and medications to help you stop smoking.    Limit drinks that contain caffeine  (coffee, black or green tea, cola) to 2 per day.    Never take stimulants such as amphetamines or cocaine; these drugs can be deadly for someone with high blood pressure.    Control your stress. Learn stress-management techniques.    Limit alcohol to no more than 1 drink a day for women and 2 drinks a day for men.  Follow-up care  Make a follow-up appointment as directed by our staff.     When to seek medical care  Call your doctor immediately if you have any of the following:    Chest pain or shortness of breath (call 911)    Moderate to severe headache    Weakness in the muscles of your face, arms, or legs    Trouble speaking    Extreme drowsiness    Confusion    Fainting or dizziness    Pulsating or rushing sound in your ears    Unexplained nosebleed    Weakness, tingling, or numbness of your face, arms, or legs    Change in vision    Blood pressure measured at home that is greater than 180/110

## 2021-06-14 NOTE — PROGRESS NOTES
Office Visit - Follow Up   Nimo Concepcion   89 y.o. female    Date of Visit: 2/2/2021    Chief Complaint   Patient presents with     Cough     for aprox 1 month pt has had a cough, stomach issues, very depressed, SOB with activity         Assessment and Plan   1. Mild episode of recurrent major depressive disorder (H)  Has recurrence of her depression. PHQ 9 score is 13. Will start sertraline 25 mg daily. Will reassess in 8 weeks.   - sertraline (ZOLOFT) 25 MG tablet; Take 1 tablet (25 mg total) by mouth daily.  Dispense: 30 tablet; Refill: 2    2. Essential hypertension  Controlled. Continue verapamil.     3. Primary insomnia  Takes trazodone which helps.       Follow up in 2 months.      History of Present Illness   This 89 y.o. old female is here for follow up. Has been having nonspecific symptoms due to recurrence of her depression. Needs treatment for this. Has hypertension controlled by verapamil. Does not sleep well without trazodone. Overall, stable despite her advanced age.      Review of Systems   A 12 point comprehensive review of systems was negative except as noted..     Medications, Allergies and Problem List   Reviewed and updated             Chief Complaint   Cough (for aprox 1 month pt has had a cough, stomach issues, very depressed, SOB with activity )       Patient Profile   Social History     Social History Narrative    . 4 grown children. Lives in a senior complex apartment. Nonsmoker. Non alcohol drinker. Retired .        Past Medical History   Patient Active Problem List   Diagnosis     Adenocarcinoma Of The Breast     Chronic Reflux Esophagitis     Primary insomnia     Joint Pain, Localized In The Shoulder     Essential Hypercholesterolemia     Moderate Recurrent Major Depression     Osteoporosis     Lightheadedness     Fatigue     Vitamin D deficiency     Sciatica     Localized Primary Osteoarthritis Of The Left Shoulder     DISH (diffuse idiopathic skeletal  hyperostosis)     Essential hypertension       Past Surgical History  She has no past surgical history on file.       Medications and Allergies   Current Outpatient Medications   Medication Sig     C,E,zinc,copper 11-omega3s-lut (OCUVITE ADULT 50 PLUS) 250-5-1 mg cap Take 1 capsule by mouth daily.     CALCIUM & MAGNESIUM CARBONATES ORAL Take by mouth.     cholecalciferol, vitamin D3, (VITAMIN D3) 2,000 unit cap Take by mouth.     CRANBERRY EXTRACT ORAL Take by mouth.     famotidine (PEPCID) 20 MG tablet Take 1 tablet (20 mg total) by mouth at bedtime.     KRILL OIL ORAL Take 1 tablet by mouth daily.     meclizine (ANTIVERT) 12.5 mg tablet Take 1 tablet (12.5 mg total) by mouth 3 (three) times a day as needed for dizziness (lightheadedness).     melatonin 1 mg Tab tablet Take 3 tablets (3 mg total) by mouth at bedtime as needed.     MULTIVIT &MINERALS/FERROUS FUM (MULTI VITAMIN ORAL) Take by mouth.     OMEGA-3/DHA/EPA/FISH OIL (FISH OIL-OMEGA-3 FATTY ACIDS) 300-1,000 mg capsule Take by mouth daily.     omeprazole (PRILOSEC) 20 MG capsule TAKE 1 CAPSULE BY MOUTH BEFORE BREAKFAST     potassium chloride (KLOR-CON) 10 MEQ CR tablet Take 3 tablets (30 mEq total) by mouth daily.     traZODone (DESYREL) 100 MG tablet Take 1 tablet (100 mg total) by mouth at bedtime.     verapamiL (VERELAN PM) 180 MG 24 hr capsule Take 1 capsule (180 mg total) by mouth daily. Take 180 mg by mouth daily.     sertraline (ZOLOFT) 25 MG tablet Take 1 tablet (25 mg total) by mouth daily.     No Known Allergies     Family and Social History   No family history on file.     Social History     Tobacco Use     Smoking status: Never Smoker     Smokeless tobacco: Never Used   Substance Use Topics     Alcohol use: No     Drug use: No        Physical Exam       Physical Exam  /76   Pulse (!) 101   Temp 98.3  F (36.8  C)   Wt 165 lb (74.8 kg)   LMP  (LMP Unknown)   SpO2 96%   BMI 28.32 kg/m    General appearance: alert, appears stated age,  cooperative and no distress  Head: Normocephalic, without obvious abnormality, atraumatic  Throat: lips, mucosa, and tongue normal; teeth and gums normal  Neck: no adenopathy, no carotid bruit, no JVD, supple, symmetrical, trachea midline and thyroid not enlarged, symmetric, no tenderness/mass/nodules  Lungs: clear to auscultation bilaterally  Heart: regular rate and rhythm, S1, S2 normal, no murmur, click, rub or gallop  Abdomen: soft, non-tender; bowel sounds normal; no masses,  no organomegaly  Extremities: extremities normal, atraumatic, no cyanosis or edema  Skin: Skin color, texture, turgor normal. No rashes or lesions   Psychiatric: flat affect, normal mood     Additional Information   Post Discharge Medication Reconciliation Status: discharge medications reconciled and changed, per note/orders      Kevin Mckinley MD  Internal Medicine  Contact me at [phone]     Additional Information   Current Outpatient Medications   Medication Sig     C,E,zinc,copper 11-omega3s-lut (OCUVITE ADULT 50 PLUS) 250-5-1 mg cap Take 1 capsule by mouth daily.     CALCIUM & MAGNESIUM CARBONATES ORAL Take by mouth.     cholecalciferol, vitamin D3, (VITAMIN D3) 2,000 unit cap Take by mouth.     CRANBERRY EXTRACT ORAL Take by mouth.     famotidine (PEPCID) 20 MG tablet Take 1 tablet (20 mg total) by mouth at bedtime.     KRILL OIL ORAL Take 1 tablet by mouth daily.     meclizine (ANTIVERT) 12.5 mg tablet Take 1 tablet (12.5 mg total) by mouth 3 (three) times a day as needed for dizziness (lightheadedness).     melatonin 1 mg Tab tablet Take 3 tablets (3 mg total) by mouth at bedtime as needed.     MULTIVIT &MINERALS/FERROUS FUM (MULTI VITAMIN ORAL) Take by mouth.     OMEGA-3/DHA/EPA/FISH OIL (FISH OIL-OMEGA-3 FATTY ACIDS) 300-1,000 mg capsule Take by mouth daily.     omeprazole (PRILOSEC) 20 MG capsule TAKE 1 CAPSULE BY MOUTH BEFORE BREAKFAST     potassium chloride (KLOR-CON) 10 MEQ CR tablet Take 3 tablets (30 mEq total) by  mouth daily.     traZODone (DESYREL) 100 MG tablet Take 1 tablet (100 mg total) by mouth at bedtime.     verapamiL (VERELAN PM) 180 MG 24 hr capsule Take 1 capsule (180 mg total) by mouth daily. Take 180 mg by mouth daily.     sertraline (ZOLOFT) 25 MG tablet Take 1 tablet (25 mg total) by mouth daily.     No Known Allergies  Social History     Tobacco Use     Smoking status: Never Smoker     Smokeless tobacco: Never Used   Substance Use Topics     Alcohol use: No     Drug use: No         Time: total time spent with the patient was 25 minutes of which >50% was spent in counseling and coordination of care

## 2021-06-14 NOTE — PROGRESS NOTES
Care guide called patient for initial outreach. Care guide reviewed goals per RN delegation. Care guide has assembled [employer]oring Choices information with Senior Linkage Line information for assistance and triglyceride reduction education to be mailed to pt 12/5.  Care guide will begin delegation to research DMEs for sources near to the pt which take prescription for BP cuff, and guide will communicate to RN about results (researching other options if necessary, by delegation due date 12/12.      RN Delegated Tasks:  Care Guide Will     Review new goals with patient at first outreach call (12/5/17)  Please coordinate getting a prescription for a walker (12/5/17) and a home blood pressure cuff for patient   Please send patient Honoring Choices packet (and volunteer info, she will need assistance in completing) to patient via mail (12/5/17)  Please send patient dietary information regarding triglycerides (CCC RN will send CG links to information to be printed) via mail  Standard outreach (12/5/17)

## 2021-06-14 NOTE — PROGRESS NOTES
Can you find out from pt what type of walker she is wanting - the more specific the better.  And also wanting a new BP cuff.  I think we can just write out an order.  Does she need it faxed somewhere?

## 2021-06-14 NOTE — PROGRESS NOTES
Care guide called pt, left message    Care guide called daughter requesting locations daughter would be willing/able to go to to obtain BP cuff from device company that honors rx. Daughter will go to [place], [place].  Daughter expressed interest in her mother obtaining a stair lift and reduced cost medical alert bracelet. Care guide will ask pt about personal safety concerns to gauge pt preference.    Care guide has identified all DME suppliers in [facility] network at the above listed location and will call to determine if they cover automatic arm cuffs.      At next outreach:  -Offer [facility] [employer] for Living Will advise  -Walker status?  -Does pt feel safe ambulating in home?

## 2021-06-14 NOTE — PROGRESS NOTES
CG routed rx for automatic BP arm cuff to [facility] [place] to be sent to [facility] in [place], [place].    Daughter to be notified as she will  the cuff   [phone]           RN Delegated Tasks Completed:  Care Guide Will      Review new goals with patient at first outreach call (12/5/17)  Please coordinate getting a prescription for a walker (12/5/17) and a home blood pressure cuff for patient  (12/14/17)  Please send patient [employer] packet (and volunteer info, she will need assistance in completing) to patient via mail (12/5/17)  Please send patient dietary information regarding triglycerides (CCC RN will send CG links to information to be printed) via mail  Standard outreach (12/5/17)

## 2021-06-14 NOTE — PROGRESS NOTES
"CCC RN requested SW to meet with pt briefly while pt was in clinic for [facility] to discuss waiver services.    SW met with pt briefly and provided informational document as well as education on EW and AC waivers through the [facility]. NÉSTOR asked the pt what help she felt she needed at this time and pt felt she was doing fine on her own right now and wanted to, \"continue as I've been.\" The pt reported that she felt she wanted to clean her home and get organized before she had any \"people coming in\" and felt she was capable of completing all her ADLs and self-care tasks independently at this time. SW encouraged pt to reach out to [facility] team if she felt she needed assist in the future.  "

## 2021-06-15 ENCOUNTER — OFFICE VISIT - HEALTHEAST (OUTPATIENT)
Dept: INTERNAL MEDICINE | Facility: CLINIC | Age: 86
End: 2021-06-15

## 2021-06-15 DIAGNOSIS — I10 ESSENTIAL HYPERTENSION: ICD-10-CM

## 2021-06-15 DIAGNOSIS — C50.919 MALIGNANT NEOPLASM OF FEMALE BREAST, UNSPECIFIED ESTROGEN RECEPTOR STATUS, UNSPECIFIED LATERALITY, UNSPECIFIED SITE OF BREAST (H): ICD-10-CM

## 2021-06-15 DIAGNOSIS — R30.0 DYSURIA: ICD-10-CM

## 2021-06-15 LAB
ALBUMIN UR-MCNC: NEGATIVE G/DL
APPEARANCE UR: CLEAR
BACTERIA #/AREA URNS HPF: ABNORMAL /[HPF]
BILIRUB UR QL STRIP: NEGATIVE
CAOX CRY #/AREA URNS HPF: ABNORMAL /[HPF]
COLOR UR AUTO: YELLOW
GLUCOSE UR STRIP-MCNC: NEGATIVE MG/DL
HGB UR QL STRIP: NEGATIVE
KETONES UR STRIP-MCNC: NEGATIVE MG/DL
LEUKOCYTE ESTERASE UR QL STRIP: ABNORMAL
NITRATE UR QL: NEGATIVE
PH UR STRIP: 5.5 [PH] (ref 5–8)
RBC #/AREA URNS AUTO: ABNORMAL HPF
SP GR UR STRIP: >=1.03 (ref 1–1.03)
SQUAMOUS #/AREA URNS AUTO: ABNORMAL LPF
UROBILINOGEN UR STRIP-ACNC: ABNORMAL
WBC #/AREA URNS AUTO: ABNORMAL HPF

## 2021-06-15 NOTE — PROGRESS NOTES
Care Guide called both pt and daughter (transportation) to remind them of her OV +GS apt tomorrow 01/19 11:40am.     At next outreach CG will:  Provide pt and daughter with materials explaining barriers and options for obtaining BP cuff and walker  Discuss home safety  Refine action steps and goals

## 2021-06-15 NOTE — PROGRESS NOTES
CG called pt and discussed all goals and action steps except finding a new place to live. Pt stated she was in the hospital yesterday for symptoms of bronchitis.  Pt called nurse triage line yesterday regarding symptoms; no diagnosis noted. Pt states she has not yet received her new arm BP cuff.       CG called pt's daughter Lesli and discussed pt's immediate needs. Daughter explained that pt fell Tuesday 12/26 and was alone on the floor after falling, unable to ambulate for three hours.  Daughter stated that pt's First Alert medical alert devices did not work, the pt was assessed by EMTs, pt did not want to go to the hospital. Daughter requested call back tomorrow 12/27 to schedule pt for OV to f/u on fall. Daughter stated fall risks: bathroom has little space, pt has been taking Trazadone at night as well as Nyquil for cold- daughter speculates this makes it difficult to ambulate safely. Daughter could not confirm that pt has diagnosed bronchitis.    Daughter affirms that she believes her mother is safe to ambulate about the house before OV. Daughter calls pt daily and neighbors/friends physically check on pt periodically. Daughter requested that CG wait til tomorrow's call to share walk-in care and nurse triage numbers. Pt has nurse triage number.      CG will:  F/u with DEM about BP arm cuff  Tomorrow 12/27, schedule pt for OV +GS  Remind pt and daughter to discuss the following with PCP: home safety evaluation, medications  Research chair lift (safe recliner), per daughter

## 2021-06-15 NOTE — PROGRESS NOTES
Pt's daughter Lesli returned call 12/29 2:31pm. Updated Lesli that she will be called by Belinda LAYTON when BP arm cuff is ready.    CG called pt to offer 11:20am apt 1/12 for OV f/u fall on 12/26.      CG will:  Remind pt of 1/12 apt at next outreach  Be present for pt's OV 1/12 to goal set to prevent future falls

## 2021-06-15 NOTE — PROGRESS NOTES
Care Guide Sharron spoke with pt and reviewed goals.  Pt is still working on advanced directive and needs more assistance. CG offered to send Advanced Care Planning phone number and info card via mail, pt accepted.    Pt stated she is not interested in getting a lift chair. Goal to move residences is on pause as pt stated to PCP on 01/18 that she does not want to move.  CG spoke to pt about the importance of mitigating falls and asked if pt would be open to CG sharing fall safety info with her at monthly outreach. Pt was open to this and CG reviewed bathroom portion of [place] [employer] [place]'s Fall Prevention Home Safety Checklist.  Pt reports on the following bathroom safety measures: has carrington light leading to bathroom that she uses, has towel bar that she sometimes uses as grab bar, does not have trouble standing in shower, is able to safely reach cleaning supplies and towel in shower, has safety -strips on tub floor, has no trouble getting off toilet as she intentionally moves slow for safety.    CG shared importance of understanding safety risk of using towel bar as makeshift grab bar. Shared information about the convenience and usability of grab bars  Will discuss again and offer resources if indicated at next outreach.    Est interval 1 mo.    At next outreach CG will:  Assess pt priority need  Reviewed kitchen portion of [place] [employer] [place]'s Fall Prevention Home Safety Checklist  Iterate usability and convenience of grab bar, offer resources if indicated

## 2021-06-15 NOTE — PROGRESS NOTES
CG spoke with pt's daughter eLsli today to inquire about if she picked up pt's BP cuff. Lesli reports that she did not  pt's BP cuff because it was too expensive.  Lesli did not clarify pt's interest in getting a different BP cuff but CG will clarify at next outreach, in-person OV +GS 01/19.  Lesli stated she is also still concerned about pt's walker. Lesli stated pt changed scheduled apt to 01/19 dt weather.    At next outreach CG will:  Discuss reduced-cost BP arm cuff options with pt and daughter  Discuss options for different walker with pt  Remind pt and daughter to discuss home safety evaluation with PCP if pt is home-bound  Discuss fall safety alert options with pt

## 2021-06-15 NOTE — PROGRESS NOTES
Assessment/ Plan     1. Falls  Patient came in today for healthcare home goalsetting after having 3 falls at home.  She is with her daughter today.  We brainstormed and discuss possibilities including home physical therapy, home safety eval, etc.  It patient and daughter are both resistant to physical therapy or safety eval.  They would like to have a letter stating they could have a house mounted mailbox so she does not have to walk out to the mailbox.  They are also going to look into see if there is insurance covers a lift chair.  I did discuss the importance of trying to stay active and do more activity or she will continue to get deconditioned.  She is willing to restart the Fosamax as hopefully this will help her osteoporosis and lower her risk for hip fracture.  She is still wants to stay in her house and does not want to move into assisted living.  She does have a first alert necklace, but was not wearing at the one time she fell.  - alendronate (FOSAMAX) 70 MG tablet; Take 1 tablet (70 mg total) by mouth every 7 days. Take in the morning on an empty stomach with a full glass of water 30 minutes before food  Dispense: 12 tablet; Refill: 1    2. Osteoporosis  As above, patient willing to restart Fosamax.    3. Essential hypertension  Patient blood pressures have been around the 150s over 90s.  Given her age, discussed with patient and daughter that I am okay excepting a little bit higher blood pressures.  They have been working on getting a home blood pressure monitor and will let me know what her values are.  I really only be concerned if she was running over 160/100.    4. Hypertriglyceridemia  Patient has isolated hypertriglyceridemia which is likely due to her diet.  She eats a lot of pretty packaged bakery items.  At this point in her life, if that is what she enjoys eating, I do not think it is that important that she make strict dietary changes.      Subjective:       Nimo Concepcion is a 87 y.o.  female who presents for follow-up of healthcare home goalsetting.  Apparently, patient had a fall a couple of weeks ago and could not get up for approximately 3 hours.  She has a first alert necklace, but was not wearing it.  Her daughter eventually got ahold of her.  She states at that time she just felt really weak and fell to the floor.  She had a bronchitis over the holidays and was treated with prednisone and an antibiotic.  We had a long discussion about falls prevention.  She definitely is at high risk for falls, but does not want to leave her home.  We discussed measures that may be helpful.  I think she is just getting really deconditioning and having proximal muscle weakness.  I talked her in the past about doing physical therapy and she has been very reluctant.  She still does not want to do it at this time.  Discussed with her and her daughter trying to get her up and moving around more.  There have been some calls back and forth about her blood pressure.  She does not want to take anymore medication and I would not want to increase her verapamil at this point.  Her numbers have not been dangerously high, mainly around 150/90 and that is acceptable at her age.  She also has had some high triglycerides, but normal cholesterol and LDL.  I think this is mostly related to her diet which includes quite a bit of processed baked goods.  She is not highly motivated to change her diet.  I think at her age I am not sure would make much difference in longevity.    Relevant past medical, family, surgical, and social history reviewed with patient, unless noted in HPI, not pertinent for this visit.    Review of Systems   A 12 point comprehensive review of systems was negative except as noted.      Current Outpatient Prescriptions   Medication Sig Dispense Refill     acetaminophen (TYLENOL) 500 MG tablet Take 500 mg by mouth every 6 (six) hours as needed for pain.       alendronate (FOSAMAX) 70 MG tablet Take 1 tablet  "(70 mg total) by mouth every 7 days. Take in the morning on an empty stomach with a full glass of water 30 minutes before food 12 tablet 1     ANTIOX #11/OM3/DHA/EPA/LUT/HENRIK (OCUVITE ADULT 50+ ORAL) Take by mouth.       aspirin 81 mg chewable tablet Chew 81 mg daily.       CALCIUM & MAGNESIUM CARBONATES ORAL Take by mouth.       cholecalciferol, vitamin D3, (VITAMIN D3) 2,000 unit cap Take by mouth.       CRANBERRY EXTRACT ORAL Take by mouth.       cyanocobalamin, vitamin B-12, 2,500 mcg Tab Take 2,500 mcg by mouth daily.       MULTIVIT &MINERALS/FERROUS FUM (MULTI VITAMIN ORAL) Take by mouth.       OMEGA-3/DHA/EPA/FISH OIL (FISH OIL-OMEGA-3 FATTY ACIDS) 300-1,000 mg capsule Take by mouth daily.       pantoprazole (PROTONIX) 40 MG tablet TAKE ONE TABLET BY MOUTH TWICE A DAY. 180 tablet 3     potassium chloride (KLOR-CON) 10 MEQ CR tablet Take 3 tablets (30 mEq total) by mouth daily. 270 tablet 2     traZODone (DESYREL) 100 MG tablet Take 1-2 tablets (100-200 mg total) by mouth at bedtime. 180 tablet 1     venlafaxine 75 mg TR24 Take 75 mg by mouth daily. 30 each 12     verapamil (VERELAN PM) 100 mg 24 hr capsule TAKE ONE CAPSULE BY MOUTH ONE TIME DAILY 90 capsule 2     No current facility-administered medications for this visit.        Objective:      /84 (Patient Site: Left Arm, Patient Position: Sitting, Cuff Size: Adult Regular)  Pulse 90  Temp 98  F (36.7  C) (Oral)   Resp 18  Ht 5' 4\" (1.626 m)  Wt 149 lb (67.6 kg)  LMP  (LMP Unknown)  SpO2 97%  Breastfeeding? No  BMI 25.58 kg/m2      General appearance: alert, appears stated age and cooperative, unsteady gait, walks with a cane  Head: Normocephalic, without obvious abnormality, atraumatic  Eyes: conjunctivae/corneas clear.  Ears: normal TM's and external ear canals both ears  Nose: Nares normal. Septum midline. Mucosa normal. No drainage or sinus tenderness.  Throat: lips, mucosa, and tongue normal; teeth and gums normal  Neck: no " adenopathy  Lungs: clear to auscultation bilaterally  Heart: regular rate and rhythm, S1, S2 normal, no murmur, click, rub or gallop    Spent at least 40 minutes with patient and daughter in discussion and exam.    No results found for this or any previous visit (from the past 168 hour(s)).       This note has been dictated using voice recognition software. Any grammatical or context distortions are unintentional and inherent to the software

## 2021-06-15 NOTE — PROGRESS NOTES
Left message with pt's daughter Lesli to help schedule OV f/u for pt's 12/26 fall. If Lesli or pt is returning my call please forward to Sharron at ext [phone] for PATRICIO or [phone] for OLENA as daughter has both numbers.

## 2021-06-15 NOTE — PROGRESS NOTES
My Clinic Care Coordination Care Plan    [facility]  [address] [place]  [address]  [phone]      My Preferred Method of Contact:  Phone: [phone]    My Primary/Preferred Language:  English    Preferred Learning Style:  Reading information, Face to face discussion, Pictures/Diagrams and Hands on teaching    Emergency Contact: Extended Emergency Contact Information  Primary Emergency Contact: RachaeldanielLesli lee   [facility]  Home Phone: [phone]  Relation: Child     PCP:  Katarina Colon MD  Specialists:    Care Team            Katarina Colon MD PCP - General    [phone]     Sharron Pena [facility] Care Coordination Care Guide    Ph. [phone]           Hospitalizations and/or ED Visits  Most Recent: 03/16/02    Reason:  Lower back and abdominal pain    Concerns regarding my health: preventing future falls, maintaining a healthy blood pressure, eating well.    Advanced Directive/Living Will: The patient was given information regarding Adanced Directives/Living Will      Nimo elected to enroll in the [facility] Care Coordination.  Nimo was given a copy of the Clinic Care Coordination brochure and full description of how to access their care team both during clinic hours and after hours.   My Care Guide's Name and Phone Number:  Sharron DE LOS SANTOS Dorian Phone: [phone], IRVIN Gates Phone: [phone]  Note: Both phones route to the same voicemail.  The Care Guide and myself agreed to be in contact every 2 weeks.      Medication Update  The patient's medication list is up to date per : REBECCA Man    Health Maintenance and Immunization Update  The patient's preventive health screenings and immunizations are up to date per ; REBECCA Man.    My Emergency Plan  Problem: Preventing Falls  Patient Emergency Plan:    Preventing Falls    Having a health problem can make you more likely to fall. Taking certain kinds of medicines may also increase your risk of falls. So, improving  your health can help you avoid a fall. Work with your healthcare provider to manage health problems and to review your medicines. If you have your health under control, your risk of falling is lessened.     When to call your healthcare provider  Be sure to call your healthcare provider if you fall. Also call if you have any of these signs or symptoms (someone else may need to point them out to you):    Feeling lightheaded or dizzy more than once a day    Losing your balance often or feeling unsteady on your feet    Feeling numbness in your legs or feet, or noticing a change in the way you walk  Having a steady decline in your memory or mental sharpness    All [facility] clinic patients have access to a Nurse 24 hours a day, 7 days a week.  If you have questions or want advice from a Nurse, please know [facility] is here for you.  You can call your clinic and they will connect you or you can call Care Connection at [phone].  [facility] also has Walk In Care clinics in multiple locations.  Call the number listed above for more information about our Walk In Care clinics or visit the [facility] website at [email or web address].    Problem: Depression  Patient Emergency Plan:    Depression  Everyone feels down at times. The blues are a natural part of life. But an unhappy period that s intense or lasts for more than a couple of weeks can be a sign of depression. Depression is a serious illness. It can disrupt the lives of family and friends. If you know someone you think may be depressed, find out what you can do to help.     Know the serious signals  Warning signals for suicide include:    Threats or talk of suicide    Statements such as  I won t be a problem much longer  or  Nothing matters     Giving away possessions or making a will or  arrangements    Buying a gun or other weapon    Sudden, unexplained cheerfulness or calm after a period of depression  If you notice any of these signs, get help right away.  Call a health care professional, mental health clinic, or suicide hotline and ask what action to take. In an emergency, don t hesitate to call the police.     [facility] Mental Health Crisis Lines:  [facility] [phone]  [facility] [phone]  [facility] [phone]  [facility] [phone]  [facility], Adults [phone]  [facility], Children [phone]  [facility], Adults [phone]  [facility], Children [phone]    All [facility] clinic patients have access to a Nurse 24 hours a day, 7 days a week.  If you have questions or want advice from a Nurse, please know [facility] is here for you.  You can call your clinic and they will connect you or you can call Care Connection at [phone].  [facility] also has Walk In Care clinics in multiple locations.  Call the number listed above for more information about our Walk In Care clinics or visit the [facility] website at [email or web address].    Problem: High Blood Pressure  Patient Emergency Plan:    High Blood Pressure (Hypertension)  You have been diagnosed with high blood pressure (also called hypertension). This means the force of blood against your artery walls is too strong. It also means your heart is working hard to move blood. High blood pressure usually has no symptoms, but over time, it can damage your heart, blood vessels, eyes, kidneys, and other organs. With help from your doctor, you can manage your blood pressure and protect your health.  Taking medications    Learn to take your own blood pressure. Keep a record of your results. Ask your doctor which readings mean that you need medical attention.    Take your blood pressure medication exactly as directed. Don t skip doses. Missing doses can cause your blood pressure to get out of control.    Avoid medications that contain heart stimulants, including over-the-counter drugs. Check for warnings about high blood pressure on the label.    Check with your  doctor before taking a decongestant. Some decongestants can worsen high blood pressure.  Lifestyle changes    Maintain a healthy weight. Get help to lose any extra pounds.    Cut back on salt.    Limit canned, dried, packaged, and fast foods.    Don t add salt to your food at the table.    Season foods with herbs instead of salt when you cook.    Follow the DASH (Dietary Approaches to Stop Hypertension) eating plan. This plan recommends vegetables, fruits, whole gains, and other heart healthy foods.    Begin an exercise program. Ask your doctor how to get started. The American Heart Association recommends aerobic exercise 3 to 4 times a week for an average of 40 minutes at a time, with your doctor's approval. Simple activities like walking or gardening can help.    Break the smoking habit. Enroll in a stop-smoking program to improve your chances of success. Ask your health care provider about programs and medications to help you stop smoking.    Limit drinks that contain caffeine (coffee, black or green tea, cola) to 2 per day.    Never take stimulants such as amphetamines or cocaine; these drugs can be deadly for someone with high blood pressure.    Control your stress. Learn stress-management techniques.    Limit alcohol to no more than 1 drink a day for women and 2 drinks a day for men.  Follow-up care  Make a follow-up appointment as directed by our staff.     When to seek medical care  Call your doctor immediately if you have any of the following:    Chest pain or shortness of breath (call 911)    Moderate to severe headache    Weakness in the muscles of your face, arms, or legs    Trouble speaking    Extreme drowsiness    Confusion    Fainting or dizziness    Pulsating or rushing sound in your ears    Unexplained nosebleed    Weakness, tingling, or numbness of your face, arms, or legs    Change in vision    Blood pressure measured at home that is greater than 180/110          All [facility] clinic patients  have access to a Nurse 24 hours a day, 7 days a week.  If you have questions or want advice from a Nurse, please know [facility] is here for you.  You can call your clinic and they will connect you or you can call Care Connection at [phone].  [facility] also has Walk In Care clinics in multiple locations.  Call the number listed above for more information about our Walk In Care clinics or visit the [facility] website at [email or web address].    Patient Support  I will ask my Care Guide, Sharron, for help in supporting me in these goals  Relationship to patient: Care Guide, Clinic Care Coordination  Work # : Dorian [phone] Tuesdays and Thursdays. [place] [phone] Mondays, Wednesdays, Fridays. , best time to call 8am-3pm

## 2021-06-16 LAB — BACTERIA SPEC CULT: NO GROWTH

## 2021-06-16 NOTE — PROGRESS NOTES
Care Guide Sharron spoke with pt to return VM received from [employer] and pt.  Line and pt were requesting CG to resend forms to pt.  CG clarified that pt was requesting [employer] forms to be resent.  CG will mail pt [employer] packet along with assistive phone number and CG business card.    At next outreach CG will:  Assess pt priority need  Discuss goals

## 2021-06-16 NOTE — TELEPHONE ENCOUNTER
I am covering Dr. Mckinley today.    He is out until 4/27.     Please advise patient follow up with another provider in person for further evaluation and rec's, however it sounds like seeing ophthalmology  is much more appropriate based on Dr. Mckinley's last note.    Dr. Cano

## 2021-06-16 NOTE — PROGRESS NOTES
Care Guide Sharron spoke with Nimo who deferred outreach to early next week dt having company over today 03/23.    Next outreach: 03/27/18    At next outreach CG will:  Assess pt priority need  Goal discussion

## 2021-06-16 NOTE — TELEPHONE ENCOUNTER
4/7/2021-Dr. Mckinley  1. Ocular palsy of right eye  Saw ophthalmology, Dr Leal for this. Had brain MRI which was negative. Was advised to have her sed rate and CRP checked when she sees me today. Still has right eye upper lid weakness. Continue to follow with ophthalmology.   - Sedimentation Rate  - C-Reactive Protein(CRP)      Nimo is still in pain with her eye and left side of her head, not sure what to do about this. Please advise on next steps

## 2021-06-16 NOTE — TELEPHONE ENCOUNTER
Called and relayed message to patients daughter. She did not seem interested in scheduling with another provider. Stated that she will figure out what to do.

## 2021-06-16 NOTE — TELEPHONE ENCOUNTER
Culture did come back positive for urinary tract infection (UTI).    I am sending in a prescription for Kevin Mckinley MD for nitrofurantoin to     [id number] - [place], [place] - [address]  [address]  [facility] [address]  Phone: [phone] Fax: [phone]    Follow up in clinic if not improving or sooner if worsening.    Kenny Ambriz MD  General Internal Medicine  [facility]  4/9/2021, 2:02 PM

## 2021-06-16 NOTE — TELEPHONE ENCOUNTER
Patient daughter Lesli, calling this morning to request call back from Dr Mckinley or one of his staff.  She states that Nimo is still in pain with her eye and left side of her head.  She was told lab results were fine, but Mom is still in pain and she's not sure what to do.  Please call Lesli back to discuss plan of care going forward.    Daughter declined appt at time of conversation.    [phone]

## 2021-06-16 NOTE — PROGRESS NOTES
Office Visit - Follow Up   Nimo Concepcion   89 y.o. female    Date of Visit: 4/7/2021    Chief Complaint   Patient presents with     Follow-up        Assessment and Plan   1. Ocular palsy of right eye  Saw ophthalmology, Dr Leal for this. Had brain MRI which was negative. Was advised to have her sed rate and CRP checked when she sees me today. Still has right eye upper lid weakness. Continue to follow with ophthalmology.   - Sedimentation Rate  - C-Reactive Protein(CRP)    2. Essential hypertension  Controlled. Continue verapamil.   - HM2(CBC w/o Differential)  - Basic Metabolic Panel    3. Essential Hypercholesterolemia  Controlled based on her last lipids. Does not take a lipid lowering med anymore considering her age.  Due to have her fasting lipids checked.   - Lipid Cascade  - Hepatic Profile    4. Abdominal pain, generalized  Complains of chronic and nonspecific abdominal pains. Come off and on. No other symptoms. Abdominal exam is benign. Check urinalysis.   - Urinalysis-UC if Indicated    5. Moderate Recurrent Major Depression  Flares up from time to time according to her daughter. Seems she gets easily impatient. Continue sertraline and trazodone.    Disability parking permit provided to the patient.       Follow up in  4 months.      History of Present Illness   This 89 y.o. old female is here for follow up. Has several medical problems. Found to have right eye ocular palsy by her eye doctor. Had brain MRI which was essentially negative. Was advised to have her sed rate and CRP checked when she sees me today. Has hypertension and hypercholesterolemia. Her hypertension is controlled by verapamil and her hyperlipidemia is controlled without need for a medication now. Complains of abdominal pains off and on. Have been going on chronically. Does not have other abdominal symptoms. Has major depression which flares up from time to time when she is stressed. Overall, she is stable.     Review of Systems    A 12 point comprehensive review of systems was negative except as noted..     Medications, Allergies and Problem List   Reviewed and updated             Chief Complaint   Follow-up       Patient Profile   Social History     Social History Narrative    . 4 grown children. Lives in a senior complex apartment. Nonsmoker. Non alcohol drinker. Retired .        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       Medications and Allergies   Current Outpatient Medications   Medication Sig     C,E,zinc,copper 11-omega3s-lut (OCUVITE ADULT 50 PLUS) 250-5-1 mg cap Take 1 capsule by mouth daily.     CALCIUM & MAGNESIUM CARBONATES ORAL Take by mouth.     cholecalciferol, vitamin D3, (VITAMIN D3) 2,000 unit cap Take by mouth.     CRANBERRY EXTRACT ORAL Take by mouth.     famotidine (PEPCID) 20 MG tablet Take 1 tablet (20 mg total) by mouth at bedtime.     KRILL OIL ORAL Take 1 tablet by mouth daily.     meclizine (ANTIVERT) 12.5 mg tablet Take 1 tablet (12.5 mg total) by mouth 3 (three) times a day as needed for dizziness (lightheadedness).     melatonin 1 mg Tab tablet Take 3 tablets (3 mg total) by mouth at bedtime as needed.     MULTIVIT &MINERALS/FERROUS FUM (MULTI VITAMIN ORAL) Take by mouth.     OMEGA-3/DHA/EPA/FISH OIL (FISH OIL-OMEGA-3 FATTY ACIDS) 300-1,000 mg capsule Take by mouth daily.     omeprazole (PRILOSEC) 20 MG capsule TAKE 1 CAPSULE BY MOUTH BEFORE BREAKFAST     potassium chloride  (KLOR-CON) 10 MEQ CR tablet Take 3 tablets (30 mEq total) by mouth daily.     sertraline (ZOLOFT) 25 MG tablet Take 1 tablet (25 mg total) by mouth daily.     traZODone (DESYREL) 100 MG tablet Take 1 tablet (100 mg total) by mouth at bedtime.     verapamiL (VERELAN PM) 180 MG 24 hr capsule Take 1 capsule (180 mg total) by mouth daily. Take 180 mg by mouth daily.     No Known Allergies     Family and Social History   No family history on file.     Social History     Tobacco Use     Smoking status: Never Smoker     Smokeless tobacco: Never Used   Substance Use Topics     Alcohol use: No     Drug use: No        Physical Exam       Physical Exam  /68   Pulse 93   LMP  (LMP Unknown)   SpO2 98%   General appearance: alert, appears stated age, cooperative and no distress  Throat: lips, mucosa, and tongue normal; teeth and gums normal  Neck: no adenopathy, no carotid bruit, no JVD, supple, symmetrical, trachea midline and thyroid not enlarged, symmetric, no tenderness/mass/nodules  Lungs: clear to auscultation bilaterally  Heart: regular rate and rhythm, S1, S2 normal, no murmur, click, rub or gallop  Abdomen: soft, non-tender; bowel sounds normal; no masses,  no organomegaly  Extremities: extremities normal, atraumatic, no cyanosis or edema  Skin: Skin color, texture, turgor normal. No rashes or lesions     Additional Information   Post Discharge Medication Reconciliation Status: discharge medications reconciled, continue medications without change      Kevin Mckinley MD  Internal Medicine  Contact me at [phone]     Additional Information   Current Outpatient Medications   Medication Sig     C,E,zinc,copper 11-omega3s-lut (OCUVITE ADULT 50 PLUS) 250-5-1 mg cap Take 1 capsule by mouth daily.     CALCIUM & MAGNESIUM CARBONATES ORAL Take by mouth.     cholecalciferol, vitamin D3, (VITAMIN D3) 2,000 unit cap Take by mouth.     CRANBERRY EXTRACT ORAL Take by mouth.     famotidine (PEPCID) 20 MG tablet Take 1  tablet (20 mg total) by mouth at bedtime.     KRILL OIL ORAL Take 1 tablet by mouth daily.     meclizine (ANTIVERT) 12.5 mg tablet Take 1 tablet (12.5 mg total) by mouth 3 (three) times a day as needed for dizziness (lightheadedness).     melatonin 1 mg Tab tablet Take 3 tablets (3 mg total) by mouth at bedtime as needed.     MULTIVIT &MINERALS/FERROUS FUM (MULTI VITAMIN ORAL) Take by mouth.     OMEGA-3/DHA/EPA/FISH OIL (FISH OIL-OMEGA-3 FATTY ACIDS) 300-1,000 mg capsule Take by mouth daily.     omeprazole (PRILOSEC) 20 MG capsule TAKE 1 CAPSULE BY MOUTH BEFORE BREAKFAST     potassium chloride (KLOR-CON) 10 MEQ CR tablet Take 3 tablets (30 mEq total) by mouth daily.     sertraline (ZOLOFT) 25 MG tablet Take 1 tablet (25 mg total) by mouth daily.     traZODone (DESYREL) 100 MG tablet Take 1 tablet (100 mg total) by mouth at bedtime.     verapamiL (VERELAN PM) 180 MG 24 hr capsule Take 1 capsule (180 mg total) by mouth daily. Take 180 mg by mouth daily.     No Known Allergies  Social History     Tobacco Use     Smoking status: Never Smoker     Smokeless tobacco: Never Used   Substance Use Topics     Alcohol use: No     Drug use: No

## 2021-06-16 NOTE — PROGRESS NOTES
Care Guide Sharron spoke with Nimo and discussed goals.  CG asked pt if she had any changes, comments, concerns or questions to share and pt said no.  CG reviewed goals with pt and assessed advanced directive status; pt has received CG paperwork and is working on it-has help line number.  CG asked about safety around the home and pt shared she moves slow but gets around to do her normal chores without problems.  CG told pt to expect to hear from CG in a few weeks to check in again.    Next outreach: 03/02/18    At next outreach CG will:  Assess pt priority need  Discuss goals

## 2021-06-16 NOTE — PROGRESS NOTES
Assessment/ Plan     1. No cerumen impaction  Patient was told to come in for cerumen impaction by a [facility] nurse that visited her at home.  Unfortunately, on exam, there is no cerumen impaction.  Her ear canals are completely clear.  Not sure why they told her to come in for this.    2. Left leg pain  Patient having some mild left leg pain only when sitting in a recliner.  She does have some varicose veins along the area that rests on the recliner.  Rest of her exam is completely normal.  We discussed possibly wearing compression stockings as that may make it lasts tender.  Were she could try putting a pillow under that leg.  I also reviewed with her labs that we did the last appointment and gave her copies.  At this point, I do not think it is worth adding a medication for her triglycerides because of her age.  She was also asking about another medication for insomnia, if there is anything new on the market.  She is currently taking trazodone and I would not recommend switching to any other prescription again due to her age.      Subjective:       Nimo Concepcion is a 87 y.o. female who presents for cerumen impaction.  Patient was seen by you care nurse in her home who told her she had bilateral cerumen impaction and that she should get it taken care of in the office.  She denies any symptoms.  She is not having any ear pain.  She does have some hearing loss at baseline.  She does not wear hearing aids.  Has not had any ear drainage.  She did want to review her blood work that we did the last time she was in.  We reviewed that and I reassured her that her cholesterol looks good, she just has moderately elevated triglycerides.  At her age, I do not think it would be worth starting a medication.  She has had a little bit of left leg pain when she sits back on her recliner.  It does not bother her when she standing or walking around.  It is tender the area that rests on the recliner.  Seeing redness, swelling, or  warmth.  This is been going on for a long, long time.  She is wondering if she can take Tylenol at bedtime for aches and pains.    Relevant past medical, family, surgical, and social history reviewed with patient, unless noted in HPI, not pertinent for this visit.    Review of Systems   A 12 point comprehensive review of systems was negative except as noted.      Current Outpatient Prescriptions   Medication Sig Dispense Refill     acetaminophen (TYLENOL) 500 MG tablet Take 500 mg by mouth every 6 (six) hours as needed for pain.       alendronate (FOSAMAX) 70 MG tablet Take 1 tablet (70 mg total) by mouth every 7 days. Take in the morning on an empty stomach with a full glass of water 30 minutes before food 12 tablet 1     ANTIOX #11/OM3/DHA/EPA/LUT/HENRIK (OCUVITE ADULT 50+ ORAL) Take by mouth.       aspirin 81 mg chewable tablet Chew 81 mg daily.       CALCIUM & MAGNESIUM CARBONATES ORAL Take by mouth.       cholecalciferol, vitamin D3, (VITAMIN D3) 2,000 unit cap Take by mouth.       CRANBERRY EXTRACT ORAL Take by mouth.       cyanocobalamin, vitamin B-12, 2,500 mcg Tab Take 2,500 mcg by mouth daily.       MULTIVIT &MINERALS/FERROUS FUM (MULTI VITAMIN ORAL) Take by mouth.       OMEGA-3/DHA/EPA/FISH OIL (FISH OIL-OMEGA-3 FATTY ACIDS) 300-1,000 mg capsule Take by mouth daily.       pantoprazole (PROTONIX) 40 MG tablet TAKE ONE TABLET BY MOUTH TWICE A DAY. 180 tablet 3     potassium chloride (KLOR-CON) 10 MEQ CR tablet Take 3 tablets (30 mEq total) by mouth daily. 270 tablet 1     traZODone (DESYREL) 100 MG tablet Take 1-2 tablets (100-200 mg total) by mouth at bedtime. 180 tablet 1     venlafaxine 75 mg TR24 Take 75 mg by mouth daily. 30 each 12     verapamil (VERELAN PM) 100 mg 24 hr capsule TAKE ONE CAPSULE BY MOUTH ONE TIME DAILY 90 capsule 2     No current facility-administered medications for this visit.        Objective:      /62  Pulse 60  Temp 97.8  F (36.6  C) (Oral)   Resp 16  Wt 153 lb (69.4 kg)   LMP  (LMP Unknown)  BMI 26.26 kg/m2      General appearance: alert, appears stated age and cooperative  Ears: normal TM's and external ear canals both ears, there is no cerumen impaction.  In fact, there is only very minimal earwax of the proximal ear canal.  .  Lungs: clear to auscultation bilaterally  Heart: regular rate and rhythm, S1, S2 normal, no murmur, click, rub or gallop    Extremities: Left lower leg: No erythema, swelling, or warmth.  The area that she states is tender there is a large varicose vein.  It is not inflamed.  Negative Homans.      No results found for this or any previous visit (from the past 168 hour(s)).       This note has been dictated using voice recognition software. Any grammatical or context distortions are unintentional and inherent to the software

## 2021-06-16 NOTE — TELEPHONE ENCOUNTER
Tosha' daughter calls in to report that Nimo is not doing well today. Daughter Lesli reports that Nimo is feeling weak and dizzy and crying.  She also states she is starting to hallucinate.  The   culture is not back yet.  She is wondering if she can be prescribed something now. Please advise at [phone] Lesli.

## 2021-06-17 NOTE — TELEPHONE ENCOUNTER
Refill Approved    Rx renewed per Medication Renewal Policy. Medication was last renewed on 4/21/21, last OV 4/7/21    Smitha White, [facility] Triage/Med Refill 5/15/2021     Requested Prescriptions   Pending Prescriptions Disp Refills     omeprazole (PRILOSEC) 20 MG capsule [Pharmacy Med Name: Omeprazole 20 MG Oral Capsule Delayed Release] 90 capsule 0     Sig: TAKE 1 CAPSULE BY MOUTH BEFORE BREAKFAST       GI Medications Refill Protocol Passed - 5/15/2021  8:51 AM        Passed - PCP or prescribing provider visit in last 12 or next 3 months.     Last office visit with prescriber/PCP: Visit date not found OR same dept: 12/3/2020 Kevin Mckinley MD OR same specialty: 4/7/2021 Kevin Mckinley MD  Last physical: Visit date not found Last MTM visit: Visit date not found   Next visit within 3 mo: Visit date not found  Next physical within 3 mo: Visit date not found  Prescriber OR PCP: Mannie Casper MD  Last diagnosis associated with med order: 1. Gastroesophageal reflux disease without esophagitis  - omeprazole (PRILOSEC) 20 MG capsule [Pharmacy Med Name: Omeprazole 20 MG Oral Capsule Delayed Release]; TAKE 1 CAPSULE BY MOUTH BEFORE BREAKFAST  Dispense: 90 capsule; Refill: 0    If protocol passes may refill for 12 months if within 3 months of last provider visit (or a total of 15 months).

## 2021-06-18 NOTE — PROGRESS NOTES
Attempt 1: Care Guide called patient.  If this patient is returning my call, please transfer to  [facility] at ext [phone] MWF or [phone] on T/TH.    Next outreach: 06/21/18    Plan:  -Discuss if she received/reviewed [facility] resource in mail sent one month ago, answer questions  -Discuss maintenance if indicated

## 2021-06-18 NOTE — PROGRESS NOTES
"Care Guide Sharron spoke with Nimo for standard Clinic Care Coordination outreach. CG and pt discussed goals and action steps. Pt clarified for CG that she would like to consider assisted living options at this time. CG asked pt if she had spoken with her daughter Lesli on this and pt stated no, that she believes daughter would like her to move closer to her and \"that's just not possible.\" CG introduced clinic's resource in [facility] and has mailed pt brochure. CG explained that CG will return call to pt once she's received the brochure and pt and CG will be able to go over information and CG can answer questions and/or place referral to Christopher from [facility] to place call to pt.    Next outreach: 05/28/18  "

## 2021-06-18 NOTE — PROGRESS NOTES
Assessment/ Plan     1. Mid back pain  Patient has had a couple month history of mid back pain.  Her x-ray is consistent with DISH.  We discussed symptomatic cares including physical therapy, acetaminophen, heat, and gentle stretching.  She denies any dysphagia or current neck pain.  She has no radicular symptoms.  - XR Thoracic Spine 2 VWS; Future  - Ambulatory referral to Adult PT- External      Subjective:       Nimo Concepcion is a 88 y.o. female who presents for evaluation of back pain.  She states it has been bothering her maybe a month or 2.  She is not very detailed historian.  She still lives by herself and is quite sedentary.  She states it feels like an ache in her mid to upper back.  She denies any trauma or falls.  It is not keeping her up at night.  She does not think it radiates anywhere.  She states certain things make it worse that she cannot exactly articulate what movements make it worse.  She has had no symptoms in her arms or legs.  Normal bowel and bladder function.    Relevant past medical, family, surgical, and social history reviewed with patient, unless noted in HPI, not pertinent for this visit.    Review of Systems   A 12 point comprehensive review of systems was negative except as noted.      Current Outpatient Prescriptions   Medication Sig Dispense Refill     acetaminophen (TYLENOL) 500 MG tablet Take 500 mg by mouth every 6 (six) hours as needed for pain.       ANTIOX #11/OM3/DHA/EPA/LUT/HENRIK (OCUVITE ADULT 50+ ORAL) Take by mouth.       aspirin 81 mg chewable tablet Chew 81 mg daily.       CALCIUM & MAGNESIUM CARBONATES ORAL Take by mouth.       cholecalciferol, vitamin D3, (VITAMIN D3) 2,000 unit cap Take by mouth.       CRANBERRY EXTRACT ORAL Take by mouth.       cyanocobalamin, vitamin B-12, 2,500 mcg Tab Take 2,500 mcg by mouth daily.       MULTIVIT &MINERALS/FERROUS FUM (MULTI VITAMIN ORAL) Take by mouth.       OMEGA-3/DHA/EPA/FISH OIL (FISH OIL-OMEGA-3 FATTY ACIDS) 300-1,000 mg  "capsule Take by mouth daily.       pantoprazole (PROTONIX) 40 MG tablet TAKE ONE TABLET BY MOUTH TWICE A DAY. 180 tablet 3     potassium chloride (KLOR-CON) 10 MEQ CR tablet Take 3 tablets (30 mEq total) by mouth daily. 270 tablet 1     traZODone (DESYREL) 100 MG tablet Take 1-2 tablets (100-200 mg total) by mouth at bedtime. 180 tablet 1     venlafaxine 75 mg TR24 Take 75 mg by mouth daily. 30 each 12     verapamil (VERELAN PM) 100 mg 24 hr capsule TAKE ONE CAPSULE BY MOUTH ONE TIME DAILY 90 capsule 2     alendronate (FOSAMAX) 70 MG tablet Take 1 tablet (70 mg total) by mouth every 7 days. Take in the morning on an empty stomach with a full glass of water 30 minutes before food 12 tablet 1     No current facility-administered medications for this visit.        Objective:      /64  Pulse 62  Temp 98.2  F (36.8  C) (Oral)   Resp 12  Ht 5' 4\" (1.626 m)  Wt 155 lb (70.3 kg)  LMP  (LMP Unknown)  BMI 26.61 kg/m2      General appearance: alert, appears stated age and cooperative  Back: Mild kyphosis and scoliosis.  No bony tenderness of the CT or L-spine.  She does have some tenderness along the T-spine.  No paraspinous muscle tenderness.  Lungs: clear to auscultation bilaterally  Heart: regular rate and rhythm, S1, S2 normal, no murmur, click, rub or gallop    X-rays: Personally reviewed, DJD with a lot of osteophytes.  Radiology read as consistent with diffuse idiopathic skeletal hyperostosis      No results found for this or any previous visit (from the past 168 hour(s)).       This note has been dictated using voice recognition software. Any grammatical or context distortions are unintentional and inherent to the software  "

## 2021-06-18 NOTE — PROGRESS NOTES
Jerome Mcdermott spoke with Nimo for standard Clinic Care Coordination outreach. Pt reported she could not find/didn't receive CG's mail resource, [facility] brochure; CG offered to mail new brochure and place direct referral to  [facility] Trujillo for pt to be contacted with more information, pt was agreeable to this. CG inquired if pt is working with family on finding a new place to live and pt stated no.  CG inquired if pt is hoping to start taking action on this immediately or if she is gathering more information and pt stated she is simply gathering information.    CG has sent referral to Christopher from [facility].        Next outreach: 07/05/18

## 2021-06-19 NOTE — PROGRESS NOTES
Chief Complaint   Patient presents with     Hospital Visit Follow Up         HPI:   Nimo Concepcion is a 88 y.o. female with daughter was seen in ER 5 days ago for lightheadedness/vertigo.  Had called her helpline and was taken in to the ER by ambulance.  Continues to feel lightheadedness. Usually comes on with going from sitting to standing or laying to sitting. Lasts about 30 seconds. Happens up to 4-5 times a day. No visual changes with the episodes.  No ear ringing.  Gets a little nauseated with the episodes. No vomiting or diarrhea.  No fever.  Going on for the last week.  One episode of palpitations about one week ago, maybe lasted about 15 minutes. Thinks it occurred after she went to the ER.  No shortness of breath. No cough  No chest pain.  No ear pain.  No ear drainage.  No change in hearing, but is Hard of hearing.  No stuffy nose.  No sore throat.  No rash.  No dysuria.  No change in arthralgias.  No headaches. No numbness or tingling. No slurring of speech. No weakness.  Walks with cane due to knee pain.    She thinks the lightheadedness has improved somewhat--not quite as frequent or lasting as long.    ROS:  Constitutional: No fatigue, weakness, weight loss or gain, fevers, night sweats   Eyes:  no changes in visual acuity, diplopia or amaurosis, no discharge, matting, redness, tearing or eye pain.    ENT: as per HPI  Respiratory: as per HPI   CV: No exertional chest pain, dyspnea, palpitations, syncope, orthopnea, edema or paroxysmal nocturnal dyspnea.   GI: no abdominal or flank pain, anorexia, nausea or vomiting, dysphagia, change in bowel habits or black or bloody stools or weight loss.   : as per HPI  SKIN: no rash  MS: as per HPI  NEURO: as per HPI   HEME/LYMPH: No abnormal bruising or abnormal bleeding. No node swelling.     Medications:  Current Outpatient Prescriptions on File Prior to Visit   Medication Sig Dispense Refill     acetaminophen (TYLENOL) 500 MG tablet Take 500 mg by mouth  "every 6 (six) hours as needed for pain.       ANTIOX #11/OM3/DHA/EPA/LUT/HENRIK (OCUVITE ADULT 50+ ORAL) Take by mouth.       aspirin 81 mg chewable tablet Chew 81 mg daily.       CALCIUM & MAGNESIUM CARBONATES ORAL Take by mouth.       cholecalciferol, vitamin D3, (VITAMIN D3) 2,000 unit cap Take by mouth.       CRANBERRY EXTRACT ORAL Take by mouth.       cyanocobalamin, vitamin B-12, 2,500 mcg Tab Take 2,500 mcg by mouth daily.       MULTIVIT &MINERALS/FERROUS FUM (MULTI VITAMIN ORAL) Take by mouth.       OMEGA-3/DHA/EPA/FISH OIL (FISH OIL-OMEGA-3 FATTY ACIDS) 300-1,000 mg capsule Take by mouth daily.       pantoprazole (PROTONIX) 40 MG tablet TAKE ONE TABLET BY MOUTH TWICE A DAY. 180 tablet 3     potassium chloride (KLOR-CON) 10 MEQ CR tablet Take 3 tablets (30 mEq total) by mouth daily. 270 tablet 1     traZODone (DESYREL) 100 MG tablet Take 1-2 tablets (100-200 mg total) by mouth at bedtime. 180 tablet 1     alendronate (FOSAMAX) 70 MG tablet Take 1 tablet (70 mg total) by mouth every 7 days. Take in the morning on an empty stomach with a full glass of water 30 minutes before food 12 tablet 1     venlafaxine 75 mg TR24 Take 75 mg by mouth daily. 30 each 12     verapamil (VERELAN PM) 100 mg 24 hr capsule TAKE ONE CAPSULE BY MOUTH ONE TIME DAILY 90 capsule 2     No current facility-administered medications on file prior to visit.          Social History:  Social History   Substance Use Topics     Smoking status: Never Smoker     Smokeless tobacco: Never Used     Alcohol use No         Physical Exam:   Vitals:    07/18/18 1542   BP: 142/78   Pulse: 64   Resp: 20   Temp: 97.9  F (36.6  C)   TempSrc: Oral   Weight: 150 lb 7 oz (68.2 kg)   Height: 5' 4\" (1.626 m)       GENERAL:   Alert. Oriented. Appears somewhat frail.  Needs help with walking  EYES: Clear  HENT:  Ears: R TM pearly gray. Normal landmarks. L TM pearly gray.  Normal landmarks  Nose: Clear.  Sinuses: Nontender.  Oropharynx:  No erythema. No " exudate.  NECK: Supple. No adenopathy.  LUNGS: Clear to ascultation.  No crackles.  No wheezing  HEART: RRR. 2/6 high pitched systolic murmur at right upper sternal border. No carotid bruit.  Normal radial pulses  SKIN:  No rash.   ABDOMEN:  +BS. No tenderness. Soft, no guarding, rebound, rigidity,mass, or organomegaly. No CVA tenderness    MS:  No swelling  NEURO: CN II-XII intact  Motor 5/5 throughout  Sensation intact to light touch  DTR's 2/4 throughout  Rapid alternating movements intact.  Finger to nose deliberate.  No past pointing.  No tremor  Heel to shin normal.  Romberg negative.  No pronator drift.  No symptom onset with rapid turning of head.      CHART REVIEW  DATE/place of visit: [facility]. 7/14/18  DX: lightheadedness  TESTS: EKG--NSR, no change from 12/27/17, normal EKG; CBC essentially normal; CMP essentially normal; UA normal; orthostatic BP no change  TREATMENT: NS IV    Cardiac Echo 2014: normal left ventricular size and function. No aortic stenosis, trace mitral regurg, mild tricuspid regur      Assessment/Plan:    1. Lightheadedness        Description of symptoms are consistent with orthostasis.    Discussed DDX including dehydration, arrythmia, CVA, medications.  She has been on all her medications for some period of time, so it is less likely that any of them would cause the symptoms now.  She doesn't appear dehydrated.  She has no neurological deficits suggestive of CVA  Had normal EKG at ER. I do hear a systolic murmur that I suspect is some mild aortic stenosis.  I don't see this mentioned on previous exams.  Discussed event monitor and cardiac echo. Patient and her daughter prefer to wait on these tests has she has improved somewhat.  Discussed warning signs for prompt evaluation.  If no improvement in 2-4 weeks evaluate.  Recommended no driving since she is having these symptoms..          Brandon Rowe MD      7/18/2018    The following portions of the patient's history  were reviewed and updated as appropriate: allergies, current medications, past family history, past medical history, past social history, past surgical history and problem list.

## 2021-06-19 NOTE — PROGRESS NOTES
Care Guide Sharron spoke with Nimo for standard Clinic Care Coordination outreach. CG read and reviewed pt's recent ED and OV notes for visits dt lightheadedness.  CG inquired about pt's sense of wellbeing following these episodes and pt reported that she is doing well, able to get around the house and run errands.  CG reminded pt of recommendation from providers that she not drive; pt stated she waited to drive for a while and now feels that she can. CG encouraged pt to reach out to [facility] for transportation resources if needed in the future; pt stated understanding.    Next outreach: 08/30/18    Plan:  -Contact [facility], if indicated to coordinate support in search for new housing  -Inquire about general wellbeing

## 2021-06-20 NOTE — PROGRESS NOTES
Sharron JEAN, Clinic Care Coordination (CCC) Care Guide (CG), spoke with Nimo today for standard CCC outreach check in.  Pt reported no changes in concerns for her safety around the home or community, and no concerns for barriers to wellness at this time. Pt's only goals continue to be to prevent future falls and to find a new place to live. Pt has hx of review of safety precautions to take in the home ([place] [employer] Fall Prevention Home Safety Checklist) and has stated understanding, and pt has been given resources to support her search for a new residence twice ([employer]). CG discussed transition to maintenance with pt and educated on reasons for an dhow to access CCC in the meantime. CG has requested maintenance wellness plan and will compose and mail upon reception from CCC RNs.    Next outreach: 01/04/18    Plan:  -Maintenance check in

## 2021-06-20 NOTE — PROGRESS NOTES
High Blood Pressure (Hypertension)  You have been diagnosed with high blood pressure (also called hypertension). This means the force of blood against your artery walls is too strong. It also means your heart is working hard to move blood. High blood pressure usually has no symptoms, but over time, it can damage your heart, blood vessels, eyes, kidneys, and other organs. With help from your doctor, you can manage your blood pressure and protect your health.  Taking medications    Learn to take your own blood pressure. Keep a record of your results. Ask your doctor which readings mean that you need medical attention.    Take your blood pressure medication exactly as directed. Don t skip doses. Missing doses can cause your blood pressure to get out of control.    Avoid medications that contain heart stimulants, including over-the-counter drugs. Check for warnings about high blood pressure on the label.    Check with your doctor before taking a decongestant. Some decongestants can worsen high blood pressure.  Lifestyle changes    Maintain a healthy weight. Get help to lose any extra pounds.    Cut back on salt.  o Limit canned, dried, packaged, and fast foods.  o Don t add salt to your food at the table.  o Season foods with herbs instead of salt when you cook.    Follow the DASH (Dietary Approaches to Stop Hypertension) eating plan. This plan recommends vegetables, fruits, whole gains, and other heart healthy foods.    Begin an exercise program. Ask your doctor how to get started. The American Heart Association recommends aerobic exercise 3 to 4 times a week for an average of 40 minutes at a time, with your doctor's approval. Simple activities like walking or gardening can help.    Break the smoking habit. Enroll in a stop-smoking program to improve your chances of success. Ask your health care provider about programs and medications to help you stop smoking.    Limit drinks that contain caffeine (coffee, black or  green tea, cola) to 2 per day.    Never take stimulants such as amphetamines or cocaine; these drugs can be deadly for someone with high blood pressure.    Control your stress. Learn stress-management techniques.    Limit alcohol to no more than 1 drink a day for women and 2 drinks a day for men.  Follow-up care  Make a follow-up appointment as directed by our staff.     When to seek medical care  Call your doctor immediately if you have any of the following:    Chest pain or shortness of breath (call 911)    Moderate to severe headache    Weakness in the muscles of your face, arms, or legs    Trouble speaking    Extreme drowsiness    Confusion    Fainting or dizziness    Pulsating or rushing sound in your ears    Unexplained nosebleed    Weakness, tingling, or numbness of your face, arms, or legs    Change in vision    Blood pressure measured at home that is greater than 180/110     Preventing Falls in the Home  An adult or child can fall for many reasons. If you are an older adult, you may fall because your reaction time slows down. Your muscles and joints may get stiff, weak, or less flexible because of illness, medicines, or a physical condition. These things can also make a child more likely to fall or be injured in a fall.  Other health problems that make falls more likely include:  Arthritis  Dizziness or lightheadedness when you get out of bed (orthostatic hypotension)  History of a stroke  Dizziness  Anemia  Certain medicines taken for mental illness  Problems with balance or gait  History of falls with or without an injury  Changes in vision (vision impairment)  Changes in thinking skills and memory (cognitive impairment)  Injuries from a fall can include broken bones, dislocated joints, and cuts. When these injuries are serious enough, they can make it impossible for you or a child who is injured in a fall to live on his or her own.  Prevention tips  To help prevent falls and fall-related injuries, follow  the tips below.   Floors  Make floors safer by doing the following:   Put nonskid pads under area rugs.  Remove throw rugs.  Replace worn floor coverings.  Tack carpets firmly to each step on carpeted stairs. Put nonskid strips on the edges of uncarpeted stairs.  Keep floors and stairs free of clutter and cords.  Arrange furniture so there are clear pathways.  Clean up any spills right away.  Wear shoes that fit.  Bathrooms  Make bathrooms safer by doing the following:   Install grab bars in the tub or shower.  Apply nonskid strips or put a nonskid rubber mat in the tub or shower.  Sit on a bath chair to bathe.  Use bathmats with nonskid backing.  Lighting and the environment  Improve lighting in your home by doing the following:   Keep a flashlight in each room. Or put a lamp next to the bed within easy reach.  Put nightlights in the bedrooms, hallways, kitchen, and bathrooms.  Make sure all stairways have good lighting.  Take your time when going up and down stairs.  Put handrails on both sides of stairs and in walkways for more support. To prevent injury to your wrist or arm, don t use handrails to pull yourself up.  Install grab bars to pull yourself up.  Move or rearrange items that you use often. This will make them easier to find or reach.  Look at your home to find any safety hazards. Especially look at doorways, walkways, and the driveway. Remove or repair any safety problems that you find.  Preventing Falls    Having a health problem can make you more likely to fall. Taking certain kinds of medicines may also increase your risk of falls. So, improving your health can help you avoid a fall. Work with your healthcare provider to manage health problems and to review your medicines. If you have your health under control, your risk of falling is lessened.    When to call your healthcare provider  Be sure to call your healthcare provider if you fall. Also call if you have any of these signs or symptoms (someone  else may need to point them out to you):    Feeling lightheaded or dizzy more than once a day    Losing your balance often or feeling unsteady on your feet    Feeling numbness in your legs or feet, or noticing a change in the way you walk    Having a steady decline in your memory or mental sharpness      5391-0030 [employer]. [address] [place] [address], [place] [id number]. All rights reserved. This information is not intended as a substitute for professional medical care. Always follow your healthcare professional's instructions.

## 2021-06-20 NOTE — LETTER
Letter by Kevin Mckinley MD at      Author: Kevin Mckinley MD Service: -- Author Type: --    Filed:  Encounter Date: 7/6/2020 Status: (Other)         July 6, 2020     Patient: Nimo Concepcion   YOB: 1930   Date of Visit: 7/6/2020       To Whom It May Concern:    It is my medical opinion that Nimo Concepcion is mentally capable of making her own decision. This letter is written upon the request of the patient for whatever purpose it may serve. .    If you have any questions or concerns, please don't hesitate to call.    Sincerely,        Electronically signed by Kevin Mckinley MD

## 2021-06-20 NOTE — PROGRESS NOTES
Chief Complaint   Patient presents with     Pain     left side since yesterday         HPI:   Nimo Concepcion is a 88 y.o. female left sided flank pain since yesterday.  Came on all of a sudden.  Intermittent.  Dull achey pain.  Worse with twisting.  Not worse with eating.  Not worse with hunger.  No fever.  Mild nausea.  No vomiting or diarrhea.    C/o constipation three days ago.  Used fleets enema last night.  Pain was present before the enema.  Improved slightly with the enema.  No blood in stool.  No dysuria.  No urinary frequency.  No blood in urine.  No cough.  No shortness of breath.  No chest pain.  No heartburn.    ROS:  A 10 point comprehensive review of systems was negative except as noted.     Medications:  Current Outpatient Prescriptions on File Prior to Visit   Medication Sig Dispense Refill     acetaminophen (TYLENOL) 500 MG tablet Take 500 mg by mouth every 6 (six) hours as needed for pain.       alendronate (FOSAMAX) 70 MG tablet Take 1 tablet (70 mg total) by mouth every 7 days. Take in the morning on an empty stomach with a full glass of water 30 minutes before food 12 tablet 1     ANTIOX #11/OM3/DHA/EPA/LUT/HENRIK (OCUVITE ADULT 50+ ORAL) Take by mouth.       aspirin 81 mg chewable tablet Chew 81 mg daily.       CALCIUM & MAGNESIUM CARBONATES ORAL Take by mouth.       cholecalciferol, vitamin D3, (VITAMIN D3) 2,000 unit cap Take by mouth.       CRANBERRY EXTRACT ORAL Take by mouth.       cyanocobalamin, vitamin B-12, 2,500 mcg Tab Take 2,500 mcg by mouth daily.       MULTIVIT &MINERALS/FERROUS FUM (MULTI VITAMIN ORAL) Take by mouth.       OMEGA-3/DHA/EPA/FISH OIL (FISH OIL-OMEGA-3 FATTY ACIDS) 300-1,000 mg capsule Take by mouth daily.       pantoprazole (PROTONIX) 40 MG tablet TAKE ONE TABLET BY MOUTH TWICE A DAY. 180 tablet 3     potassium chloride (KLOR-CON) 10 MEQ CR tablet Take 3 tablets (30 mEq total) by mouth daily. 270 tablet 1     traZODone (DESYREL) 100 MG tablet Take 1-2 tablets  "(100-200 mg total) by mouth at bedtime. 180 tablet 1     venlafaxine 75 mg TR24 Take 75 mg by mouth daily. 30 each 12     verapamil (VERELAN PM) 100 mg 24 hr capsule TAKE ONE CAPSULE BY MOUTH ONE TIME DAILY 90 capsule 2     No current facility-administered medications on file prior to visit.          Social History:  Social History   Substance Use Topics     Smoking status: Never Smoker     Smokeless tobacco: Never Used     Alcohol use No         Physical Exam:   Vitals:    09/05/18 1544   BP: 152/82   Pulse: 68   Resp: 12   Temp: 97.8  F (36.6  C)   TempSrc: Oral   Weight: 151 lb (68.5 kg)   Height: 5' 4\" (1.626 m)       GENERAL:   Alert. Oriented.  EYES: Clear  HENT:  Ears: R TM pearly gray. Normal landmarks. L TM pearly gray.  Normal landmarks  Nose: Clear.  Sinuses: Nontender.  Oropharynx:  No erythema. No exudate.  NECK: Supple. No adenopathy.  LUNGS: Clear to ascultation.  No crackles.  No wheezing  HEART: RRR  SKIN:  No rash.   ABDOMEN:  +BS. No tenderness. Soft, no guarding, rebound, rigidity,mass, or organomegaly. No CVA tenderness      LABS:  Results for orders placed or performed in visit on 09/05/18   Urinalysis-UC if Indicated   Result Value Ref Range    Color, UA Yellow Colorless, Yellow, Straw, Light Yellow    Clarity, UA Clear Clear    Glucose, UA Negative Negative    Bilirubin, UA Negative Negative    Ketones, UA 15 mg/dL (!) Negative    Specific Gravity, UA >=1.030 1.005 - 1.030    Blood, UA Negative Negative    pH, UA 5.5 5.0 - 8.0    Protein, UA Trace (!) Negative mg/dL    Urobilinogen, UA 0.2 E.U./dL 0.2 E.U./dL, 1.0 E.U./dL    Nitrite, UA Negative Negative    Leukocytes, UA Small (!) Negative    Bacteria, UA Few (!) None Seen hpf    RBC, UA 0-2 None Seen, 0-2 hpf    WBC, UA 10-25 (!) None Seen, 0-5 hpf    Squam Epithel, UA None Seen None Seen, 0-5 lpf    WBC Clumps Present (!) None Seen    Mucus, UA Many (!) None Seen lpf    Ca Oxalate Janell, UA Present (!) None Seen   HM1 (CBC with Diff) "   Result Value Ref Range    WBC 9.4 4.0 - 11.0 thou/uL    RBC 4.54 3.80 - 5.40 mill/uL    Hemoglobin 14.3 12.0 - 16.0 g/dL    Hematocrit 42.6 35.0 - 47.0 %    MCV 94 80 - 100 fL    MCH 31.5 27.0 - 34.0 pg    MCHC 33.5 32.0 - 36.0 g/dL    RDW 12.1 11.0 - 14.5 %    Platelets 163 140 - 440 thou/uL    MPV 7.6 7.0 - 10.0 fL    Neutrophils % 57 50 - 70 %    Lymphocytes % 33 20 - 40 %    Monocytes % 9 2 - 10 %    Eosinophils % 1 0 - 6 %    Basophils % 1 0 - 2 %    Neutrophils Absolute 5.3 2.0 - 7.7 thou/uL    Lymphocytes Absolute 3.1 0.8 - 4.4 thou/uL    Monocytes Absolute 0.8 0.0 - 0.9 thou/uL    Eosinophils Absolute 0.1 0.0 - 0.4 thou/uL    Basophils Absolute 0.1 0.0 - 0.2 thou/uL        Assessment/Plan:    1. Left upper quadrant pain  Urinalysis-UC if Indicated    HM1(CBC and Differential)    Comprehensive Metabolic Panel    HM1 (CBC with Diff)    Culture, Urine   2. Benign essential HTN  potassium chloride (KLOR-CON) 10 MEQ CR tablet      No signs of infection.  No peritoneal signs on abdominal exam.  Had some improvement with bowel movement following fleets enema.  Recommend bottle of mag citrate.  Recheck if worsening or not improving.    Refilled potassium as requested.          Brandon Rowe MD      9/5/2018    The following portions of the patient's history were reviewed and updated as appropriate: allergies, current medications, past family history, past medical history, past social history, past surgical history and problem list.

## 2021-06-20 NOTE — PROGRESS NOTES
Care Guide Sharron spoke with Nimo for standard Clinic Care Coordination outreach. CG inquired about pt general wellbeing and safely following 07/14 admit to ED for light headedness and pt goal to remain free of falls and safe in the home. Pt reported she has felt well and has been able to get around her home and community safely with cane and walker. CG educated pt that she may use CCC as a resource if her needs change; pt stated understanding. Cg and pt agreed to extended outreach at 1 mo.    Next outreach: 10/04/18    Plan:  -Inquire about general wellbeing- safety goal

## 2021-06-21 NOTE — PROGRESS NOTES
(10/24) Nimo's daughter Lesli LMTCB for Sharron JEAN, Clinic Care Coordination (CCC) Care Guide (CG), to reschedule 10/24 10am CCC RN phone assessment.    (10/25) CG spoke with Lesli and rescheduled apt for 10/25 1pm.    Next outreach; 10/26/18    Plan:  -Goal setting

## 2021-06-21 NOTE — LETTER
Letter by Kevin Mckinley MD at      Author: Kevin Mckinley MD Service: -- Author Type: --    Filed:  Encounter Date: 12/4/2020 Status: (Other)         Nimo Concepcion  [address]  [place] [address]             December 4, 2020         Dear Ms. Concepcion,    Below are the results from your recent visit:    Resulted Orders   Urinalysis-UC if Indicated   Result Value Ref Range    Color, UA Yellow Colorless, Yellow, Straw, Light Yellow    Clarity, UA Clear Clear    Glucose, UA Negative Negative    Bilirubin, UA Negative Negative    Ketones, UA Negative Negative    Specific Gravity, UA >=1.030 1.005 - 1.030    Blood, UA Trace (!) Negative    pH, UA 5.0 5.0 - 8.0    Protein, UA Negative Negative mg/dL    Urobilinogen, UA 1.0 E.U./dL 0.2 E.U./dL, 1.0 E.U./dL    Nitrite, UA Negative Negative    Leukocytes, UA Trace (!) Negative    Bacteria, UA Few (!) None Seen hpf    RBC, UA 0-2 None Seen, 0-2 hpf    WBC, UA 0-5 None Seen, 0-5 hpf    Squam Epithel, UA 0-5 None Seen, 0-5 lpf    Ca Oxalate Janell, UA Present (!) None Seen    Narrative    Urine Culture ordered based on [facility]' criteria   HM2(CBC w/o Differential)   Result Value Ref Range    WBC 9.5 4.0 - 11.0 thou/uL    RBC 4.93 3.80 - 5.40 mill/uL    Hemoglobin 15.9 12.0 - 16.0 g/dL    Hematocrit 47.3 (H) 35.0 - 47.0 %    MCV 96 80 - 100 fL    MCH 32.2 27.0 - 34.0 pg    MCHC 33.6 32.0 - 36.0 g/dL    RDW 11.9 11.0 - 14.5 %    Platelets 199 140 - 440 thou/uL    MPV 7.7 7.0 - 10.0 fL   Basic Metabolic Panel   Result Value Ref Range    Sodium 141 136 - 145 mmol/L    Potassium 4.0 3.5 - 5.0 mmol/L    Chloride 104 98 - 107 mmol/L    CO2 25 22 - 31 mmol/L    Anion Gap, Calculation 12 5 - 18 mmol/L    Glucose 125 70 - 125 mg/dL    Calcium 9.1 8.5 - 10.5 mg/dL    BUN 17 8 - 28 mg/dL    Creatinine 0.70 0.60 - 1.10 mg/dL    GFR MDRD Af Amer >60 >60 mL/min/1.73m2    GFR MDRD Non Af Amer >60 >60 mL/min/1.73m2    Narrative    Fasting Glucose  reference range is 70-99 mg/dL per  American Diabetes Association (ADA) guidelines.       Normal urine. Normal metabolic panel. Good!    Please call with questions or contact us using [employer].    Sincerely,        Electronically signed by Kevin Mckinley MD

## 2021-06-21 NOTE — PROGRESS NOTES
[facility] SW called pt's daughter Lesli at 8:00 AM per RN request to follow up after RN attempted phone visit, SW able to connect with Lesli.    Lesli explained to SW that the pt lives in 2 level home with basement, bedroom upstairs, laundry and freezer in the basement. The pt broke her back from recent fall, Lesli has been providing 24 hour care at home at this time. SW discussed in-home services vs facility placement. Lesli felt pt did not need facility placement as she and her siblings have been able to provide assistance for 24 hour care needs, however, Lesli is feeling she needs additional help. NÉSTOR educated Lesli on the [facility] programs and informed Lesli that the process is not fast--can take a month or more before the pt can get services established. NÉSTOR discussed private pay options in the meantime, Lesli reported this was not necessary.    Lesli was not able to give SW clear idea of what pt's finances are. SW informed Lesli that SW will need to have a better idea before SW can advise on the most appropriate option. Lesli agreed to follow up call next Tuesday 10/30/18 at 10:00 AM--by this time Lesli reported she will get pt's financial info.     SW will plan to discuss further with Lesli during follow up call. NÉSTOR did informed Lesli that NÉSTOR is recommending hiring assistance at this time if Lesli is unable to provide 24 hour care or to place the pt in a LTC to get 24 hour care needs met.     [facility] RN has requested home care from PCP.

## 2021-06-21 NOTE — PROGRESS NOTES
Sharron JEAN, Clinic Care Coordination (CCC) Care Guide (CG), was consulted by [facility] RN Mary following attempted RECAM via phone with Nimo's daughter Lesli. CG was advised that CCC RN and SW would follow up with daughter Lesli on the pt's needs at this time. No further CG outreach at this time.

## 2021-06-21 NOTE — PROGRESS NOTES
Discussed UA results in clinic. No sign of infection. No blood on microscopy. Urine is concentrated and encouraged increased hydration. Awaiting BMP results.

## 2021-06-21 NOTE — PROGRESS NOTES
Please call Nimo with the following update.    Inocencio Suero,  It was very nice to meet you today.   Your blood work has returned and your kidney function is stable. This is reassuring that you are not too dehydrated currently.   I have also reached out to Home Care and they will be coming out to evaluate and see if we can get any additional assistance into the home.  Please let me know if there is anything else we can do for your in the next few days.   Please come back in 1-2 weeks as discussed so that we can follow up on your pain and further discuss your falls.  Sincerely,  Elsi oPlo, DO

## 2021-06-21 NOTE — PROGRESS NOTES
Nimo's daughter Shannon called Sharron JEAN Clinic Care Coordination (CCC) Care Guide (CG), requesting supportive services for Nimo following a fall and 10/16 ED admission for closed fracture of lumbar vertebrae. Pt was recently transitioned to CCC maintenance after nearly one year of unremarkable follow up on goals to prevent falls in the home and find a new place to live.     [facility] had supported pt in the following ways to prevent falls:    -Pt has been checked in on by CCC bimonthly for one year, with fall prevention goal  -Dtr and pt met with [facility] SW 11/28/2017 and SW offered AC & EC waivers and they declined   -CG arranged 01/19 OV with PCP to address falls (pt reported hx x3) and pt and dtr declined PCP recommendations including in home safety eval (see note)   - has reviewed [place] [facility] [place] Fall Prevention Home Safety Checklist with pt and had sent 02/05 letter to pt with recommendations for staying safe   -CG had provided [employer] resource x2 to support finding pt new place to live (requested by pt) and had personally coordinated contact with  Christopher DALE.     Daughter is now requesting help to address pt needs.  notes pt was taken to non- hospital for ED admit. CG inquired if they were open to scheduling ENF with PCP and dtr stated no, that the soonest available was 10/31 and that they intended to see a [place] clinic as that wasn't soon enough and because pt is unable to leave the home. CG has scheduled re-assessment of needs at this time with CCC RAKESH Hernandez for 10/25 10am.    Next outreach: 10/26/18    Plan:  -Address CCC RN recommendations

## 2021-06-21 NOTE — PROGRESS NOTES
"Called Dtr Lesli to speak with her regarding patients needs.  Lesli stating patient is in need of 24hr care and she is currently providing this.  Lesli agitated during phone conversation with writer stating \"you already said that\", \"why are you repeating yourself\" \"I said that once, I'm not saying it again\".  Writer attempting to coordinate care with dtr regarding current needs.  Dtr self obstructing cares during conversation.  Lesli had thoughts that writer would call patient today and then a nurse would come to her home in 2 days'.  Explained to Lesli that this was not the process.  Offered the possibility of home care as that may have been what she was thinking (with a nurse coming out in \"2 days\")  Dtr thought home care was a 24hr caregiver service.  Explained that nursing, PT & OT would come to the home for 1 hour a couple of times per wk for skilled services and not care giver services.  Lesli was verbally upset by this stating \"what good is that\".  Writer did not complete RN Assessment.  Offered to have CCC SW call Dtr with 24hr caregiver options.  Writer stating CCC SW would call her back with resources.  No CCC Enrollment.  "

## 2021-06-21 NOTE — PROGRESS NOTES
[facility] SW called Lesli at scheduled phone visit to discuss pt's finances and give better direction on in-home services options. Lesli reported a SW from the home care agency had come out yesterday and spoke with her and that she will be meeting with her again to go over the finances. SW clarified that home care SW will be assisting with navigating long term home care services, Lesli informed SW this was the case. SW informed Lesli that this was what SW was assisting with as well, SW will not proceed with conversation as this will be duplication of services and Lesli is choosing to meet with the home care SW again moving forward to work on in-home services.

## 2021-06-21 NOTE — PROGRESS NOTES
Sharron JEAN, Clinic Care Coordination (CCC) Care Guide (CG), was consulted by [facility] NÉSTOR BOLDEN, on outreach to pt following request for SW orders from an external home care provider. [facility] SW consulted with [facility] RN. Pt is now being seen by [facility] and will have SW needs met by [facility] NÉSTOR.  has requested CG to notify SW of due outreach for CCC, as SW will complete this outreach dt pt's dtr receptiveness to this team member.    Next outreach: 11/30/18    Plan:  -Remind [facility] SW to call pt's dtr

## 2021-06-21 NOTE — PROGRESS NOTES
[facility] SW called pt's daughter Lesli to check-in. Lesli had been working with the home care SW to discuss in-home services and options for this need for the pt. SW had to leave a .

## 2021-06-21 NOTE — PROGRESS NOTES
[facility] Clinic Note    Nimo Concepcion  4/11/1930   [id number]    Nimo Concepcion is a 88 y.o. female with significant past medical history of breast cancer, osteoporosis, and previous falls presenting to discuss the following:     CC:   Chief Complaint   Patient presents with     Back Pain       HPI:    BACK PAIN S/P FALL - L4 burst fracture  Reviewed emergency department note from [facility].  He was evaluated on October 16 for back pain secondary to fall.  CT scan revealed osteopenia, multilevel degenerative disc disease, levoscoliosis, a comminuted burst type fracture of L4, and mild central stenosis at L3-L4 and L4-L5.  Case was reviewed with on-call neurosurgery at [facility].  TLSO brace, pain management and discharge position to home were recommended.  Given advanced age and the fracture, she was deemed not a good candidate for surgery.  They recommended follow-up with Wanda Garrison at the neurosurgery clinic.  - Back pain 5/10 during day, dull ache.  She will take Tylenol as needed, does not use opioid during the day.  - Using Percocet at night to help her sleep, is almost out.  This is helpful and she requests a refill for pain.  - Fall was secondary to loss of consciousness.  She has fallen a few times, once when tripped on hose, other times doesn't remember concern for loss of consciousness.  She states this has not been previously evaluated.  - Prior to injury, she ambulated without assist, now using walker at home, wheelchair here.   - Her daughter has been helping her significantly after this injury.  She is unable to perform her activities of daily living.  They could use some additional assistance at this time.  They have a phone conversation with a nurse at 1:00 today to further discuss options. She also is working with Sharron, care coordinator.   -Nimo does report decreased oral intake given his difficult to get to the bathroom and she does not have an  "accident.  She is concerned about dark \"orange\" colored urine.  She denies any urinary frequency or dysuria.    ROS:   CONSTITUTIONAL: Afebrile.  HEART: Concern for loss of consciousness per above, occasional dizziness/lightheadedness.  LUNGS: Denies any cough or shortness of breath  ABDOMEN: No abdominal pain, does endorse constipation and is on a bowel regimen.  : See above  MSK: Pain per above  NEURO: Denies any numbness, weakness, tingling, or radicular symptoms.  Denies loss of bowel or bladder function.    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    PSH:   No past surgical history on file.      MEDICATIONS:   Current Outpatient Prescriptions on File Prior to Visit   Medication Sig Dispense Refill     ANTIOX #11/OM3/DHA/EPA/LUT/HENRIK (OCUVITE ADULT 50+ ORAL) Take by mouth.       aspirin 81 mg chewable tablet Chew 81 mg daily.       cholecalciferol, vitamin D3, (VITAMIN D3) 2,000 unit cap Take by mouth.       CRANBERRY EXTRACT ORAL Take by mouth.       cyanocobalamin, vitamin B-12, 2,500 mcg Tab Take 2,500 mcg by mouth daily.       MULTIVIT &MINERALS/FERROUS FUM (MULTI VITAMIN ORAL) Take by mouth.       OMEGA-3/DHA/EPA/FISH OIL (FISH OIL-OMEGA-3 FATTY ACIDS) 300-1,000 mg capsule Take by mouth daily.       pantoprazole (PROTONIX) 40 MG tablet Take 1 tablet (40 mg total) by mouth 2 (two) times a day. 180 tablet 2     potassium chloride (KLOR-CON) 10 MEQ CR tablet Take 3 tablets (30 mEq total) by mouth daily. 270 tablet 1     traZODone (DESYREL) 100 MG tablet Take 1-2 tablets " (100-200 mg total) by mouth at bedtime. 180 tablet 1     verapamil (VERELAN PM) 100 mg 24 hr capsule TAKE ONE CAPSULE BY MOUTH ONE TIME DAILY 90 capsule 2     acetaminophen (TYLENOL) 500 MG tablet Take 500 mg by mouth every 6 (six) hours as needed for pain.       alendronate (FOSAMAX) 70 MG tablet Take 1 tablet (70 mg total) by mouth every 7 days. Take in the morning on an empty stomach with a full glass of water 30 minutes before food 12 tablet 1     CALCIUM & MAGNESIUM CARBONATES ORAL Take by mouth.       venlafaxine 75 mg TR24 Take 75 mg by mouth daily. 30 each 12     No current facility-administered medications on file prior to visit.        ALLERGIES:  No Known Allergies     PHYSICAL EXAM:   /84  Pulse 96  Temp 97.6  F (36.4  C) (Oral)   Resp 20  LMP  (LMP Unknown)   GENERAL: Gisselle is a pleasant elderly female, appears stated age, in no acute distress.  HEENT: Sclera white, oral mucosa moist  HEART: Regular rate and rhythm, no murmurs.  LUNGS: Clear to auscultation bilaterally, unlabored.  MSK: TLSO brace in place, sitting in wheelchair.    ASSESSMENT & PLAN:     Nimo Concepcion is a 88 y.o. elderly female with past medical history significant for remote breast cancer, osteoporosis, recurrent falls, and complaints of lightheadedness.  She presents today to further discuss back pain and dark urine.  This is after a fall on October 16 for which she went to the emergency department and was diagnosed with a L4 lumbar compression fracture.     1. Closed compression fracture of L4 lumbar vertebra with routine healing, subsequent encounter  2. Age-related osteoporosis with current pathological fracture with routine healing, subsequent encounter  - oxyCODONE-acetaminophen (PERCOCET/ENDOCET) 5-325 mg per tablet; Take 1 tablet by mouth at bedtime as needed for pain.  Dispense: 14 tablet; Refill: 0  - Ambulatory referral to Home Health    Refilled Percocet for pain at bedtime.  Did review [place] , only  other prescription was from the emergency department, appropriate use previously.  Recommend she continue to use the TLSO brace and ambulate with walker at home.  Encouraged to follow-up with neurosurgery office per emergency department recommendations.     Given inability to perform ADLs and daughter report of insufficient ability to assist, did speak with care manager about case and have placed order for [facility] health care for PT, OT, home health aide, and social work.    I did assess for dehydration with BMP to evaluate for CLARK and urine.  Urine is concentrated but normal renal function.  This is reassuring that she is at least receiving adequate oral fluid intake.     We did not discuss today etiology of falling.  It sounds like she has had several episodes of falls related to what sounds like loss of consciousness.  I do not believe this is been evaluated at this point in time.  It is worth a discussion with daughter and Nimo to see if they would like to pursue cardiac monitoring to evaluate for arrhythmias or sick sinus syndrome.    We also did not discuss osteoporosis management.  Alendronate is on her medication list, but she is not sure if she is taking this or not.  Given lumbar compression fracture, this is consistent with osteoporosis.    3. Primary insomnia  - traZODone (DESYREL) 100 MG tablet; Take 1-2 tablets (100-200 mg total) by mouth at bedtime.  Dispense: 180 tablet; Refill: 1    Okay to continue trazodone with Percocet at bedtime.  Due for refill.    4. Decreased oral intake  5. Dark urine  - Basic Metabolic Panel  - Urinalysis-UC if Indicated    RTC: 1-2 weeks to follow-up on home needs, pain management, further discuss etiology of falls, consider further cardiac evaluation if desired.    Elsi Polo, DO

## 2021-06-22 NOTE — PROGRESS NOTES
CCC SW called Lesli to check in--3rd attempt. Lesli did answer.    Lesli updated NÉSTOR that the homecare SW assisted with referring the pt to the [facility] for a waiver and that they have met with someone but services haven't started yet. Lesli reported they discussed getting the pt a stair chair lift.    NÉSTOR informed Lesli PALM will check in again in a couple of weeks to see if services have started yet and discuss any further need for CCC, Lesli agreeable to this.

## 2021-06-22 NOTE — PROGRESS NOTES
"[facility] SW called to check in with Lesli on if AC waiver services have started for the pt.    Lesli reported the pt has a  who just called yesterday to check-in. The CM is working on getting the pt incontinence supplies, a lift chair for the stairs and a home alert system.    Lesli talked about getting the pt more services in the home, SW informed Lesli she should notify the CM if she is wanting someone to come in and assist with cares if Lesli is not wanting to provide 24 hour care. Lesli reported she would discuss this with the CM.    Lesli reported she was \"confused by all the people that call\" and is struggling to keep everyone straight. NÉSTOR explained who SW was and affiliation with the PCP at [facility]; SW explained due to this confusion with so many workers and the pt having case management now, SW thinking d/c from [facility] may be for the best at this time to reduce confusion and have less workers calling. Lesli agreed but informed SW that the pt was no longer receiving primary care at [facility], had switched to a [place] clinic near [place]. SW explained that due to this, [facility] will be d/c the pt from [facility] services. NÉSTOR explained that if Lesli does need assist outside of the AC waiver  that is establishing services for the pt at home, Lesli can reach out to the [facility] team at the pt's new clinic and request enrollment. Lesli understood.    [facility] SW ended current goal around preventing falls and removed CCC team members from care team.    "

## 2021-06-23 ENCOUNTER — COMMUNICATION - HEALTHEAST (OUTPATIENT)
Dept: SCHEDULING | Facility: CLINIC | Age: 86
End: 2021-06-23

## 2021-06-23 NOTE — TELEPHONE ENCOUNTER
Her daughter told us that she no longer comes here - is getting care through [facility].  Please clarify with Nimo or daughter.

## 2021-06-23 NOTE — TELEPHONE ENCOUNTER
Orders being requested: will Katarina Colon MD continue to follow patient with home cares   Reason service is needed/diagnosis: patient is discharging TCU today  When are orders needed by: now   Where to send Orders: Phone:  [phone]  Okay to leave detailed message?  Yes

## 2021-06-23 NOTE — TELEPHONE ENCOUNTER
Left a message for Carla Suarez [facility] - Dr. Muhammad is her new PCP. Closing task.

## 2021-06-25 NOTE — TELEPHONE ENCOUNTER
Pt's daughter, Lesli, reports that pt has had intermittent, 5/10, headaches that extend from behind left eye, behind the left ear, and at the back of her neck, x 1 to 2 weeks.  She has nausea with the headaches, but does not vomit. Her appetite is a little decreased, but she is tolerating fluids well.  Pt also has nightmares at night that inhibit sleep.  Lesli will massage the painful areas with Icy Hot, and she gives the pt Excedrin Migraine, both of which help to resolve the headaches.  Pt states that she has difficulty keeping her eyes open due to sleepiness.   She denies any blurred vision, chest pain, shortness of breath.    Per protocol, pt should see a provider within 24 hours.  Lesli transferred to scheduling to set up an appointment for tomorrow.  Britney St RN 06/13/21 12:17 PM  [facility] Nurse Advisor      Reason for Disposition    [1] MODERATE headache (e.g., interferes with normal activities) AND [2] present > 24 hours AND [3] unexplained  (Exceptions: analgesics not tried, typical migraine, or headache part of viral illness)    Additional Information    Negative: Difficult to awaken or acting confused (e.g., disoriented, slurred speech)    Negative: [1] Weakness of the face, arm or leg on one side of the body AND [2] new onset    Negative: [1] Numbness of the face, arm or leg on one side of the body AND [2] new onset    Negative: [1] Loss of speech or garbled speech AND [2] new onset    Negative: Passed out (i.e., lost consciousness, collapsed and was not responding)    Negative: Sounds like a life-threatening emergency to the triager    Negative: Followed a head injury    Negative: Pregnant    Negative: Postpartum (from 0 to 6 weeks after delivery)    Negative: Traumatic Brain Injury (TBI) is suspected    Negative: Unable to walk, or can only walk with assistance (e.g., requires support)    Negative: Stiff neck (can't touch chin to chest)    Negative: Severe pain in one eye     "Negative: [1] Other family members (or roommates) with headaches AND [2] possibility of carbon monoxide exposure    Negative: [1] SEVERE headache (e.g., excruciating) AND [2] \"worst headache\" of life    Negative: [1] SEVERE headache AND [2] sudden-onset (i.e., reaching maximum intensity within seconds)    Negative: [1] SEVERE headache AND [2] fever    Negative: Loss of vision or double vision (Exception: same as prior migraines)    Negative: [1] Fever > 100.0 F (37.8 C) AND [2] diabetes mellitus or weak immune system (e.g., HIV positive, cancer chemo, splenectomy, organ transplant, chronic steroids)    Negative: Patient sounds very sick or weak to the triager    Negative: [1] SEVERE headache (e.g., excruciating) AND [2] not improved after 2 hours of pain medicine    Negative: [1] Vomiting AND [2] 2 or more times (Exception: similar to previous migraines)    Negative: Fever > 104 F (40 C)    Protocols used: HEADACHE-A-AH    COVID 19 Nurse Triage Plan/Patient Instructions    Please be aware that novel coronavirus (COVID-19) may be circulating in the community. If you develop symptoms such as fever, cough, or SOB or if you have concerns about the presence of another infection including coronavirus (COVID-19), please contact your health care provider or visit [email or web address].     Disposition/Instructions    In-Person Visit with provider recommended. Reference Visit Selection Guide.    Thank you for taking steps to prevent the spread of this virus.  o Limit your contact with others.  o Wear a simple mask to cover your cough.  o Wash your hands well and often.    Resources    [facility]: About COVID-19: [email or web address]    CDC: What to Do If You're Sick: www.cdc.gov/coronavirus/2019-ncov/about/steps-when-sick.html    CDC: Ending Home Isolation: www.cdc.gov/coronavirus/2019-ncov/hcp/disposition-in-home-patients.html     CDC: Caring for Someone: " www.cdc.gov/coronavirus/2019-ncov/if-you-are-sick/care-for-someone.html     [facility]: Interim Guidance for Hospital Discharge to Home: www.health.[facility]/diseases/coronavirus/hcp/hospdischarge.pdf    [facility] clinical trials (COVID-19 research studies): clinicalaffairs.[employer].[facility]/[employer]-clinical-trials     Below are the COVID-19 hotlines at the [place] [employer] [facility] ([facility]). Interpreters are available.   o For health questions: Call 840-464-8707 or 1-344.159.3574 (7 a.m. to 7 p.m.)  o For questions about schools and childcare: Call 366-848-8540 or 1-602.866.2301 (7 a.m. to 7 p.m.)

## 2021-06-26 NOTE — TELEPHONE ENCOUNTER
Daughter calling.  Mom seen a week ago.  Had bad UTI then.  Pills are finished and now nauseated and not eating much now.  Stomach is bothering her now. Not watching tv like her usual. Closes eyes and sleeps lots of times per day.  Took her last UTI med yesterday.  Close to vomiting.    What should daughter do?  Have her seen again?  She notices mom is rubbing her lower stomach again.  Can she be treated again?    Uses [facility] in [place].    Please advise.    Polly Lares RN [facility] Triage Nurse Advisor

## 2021-06-26 NOTE — TELEPHONE ENCOUNTER
Called and spoke with Daughter 9am is too ear;y per patient. Spoke with PCP and got the okay to schedule for 12:40.    Patient is scheduled

## 2021-06-26 NOTE — PROGRESS NOTES
Office Visit - Follow Up   Nimo Concepcion   89 y.o. female    Date of Visit: 6/15/2021    Chief Complaint   Patient presents with     Dysuria     Nausea, unable to sleep, headaches, change in color of urine, no frequency.         Assessment and Plan   1. Dysuria  Has been having dysuria for a week or so now. Also has some suprapubic discomfort. Denies fever. Had similar complaints in April. Urinalysis was positive for UTI and urine culture grew E.coli sensitive to sulfa/trimethoprim. Checked urinalysis and shows WBC. Will send this for urine culture. Will treat with sulfamethoxazole-trimethoprim for 7 days.   - Urinalysis-UC if Indicated  - sulfamethoxazole-trimethoprim (SEPTRA DS) 800-160 mg per tablet; Take 1 tablet by mouth 2 (two) times a day for 7 days.  Dispense: 14 tablet; Refill: 0  - Culture, Urine    2. Essential hypertension  Controlled. Continue verapamil.      3. Malignant neoplasm of female breast, unspecified estrogen receptor status, unspecified laterality, unspecified site of breast (H)  Now in remission. No recurrence after her bilateral mastectomy years ago.       Follow up in 3 months as previously scheduled.      History of Present Illness   This 89 y.o. old female brought by her daughter because of dysuria. Daughter thinks she has recurrence of her UTI.  Has been listless since she has dysuria a week ago. Denies fever. Has some suprapubic discomfort. Had similar complaints in the past for which she was found to have positive urinalysis and urine culture grew E.coli.  Has hypertension controlled by verapamil had bilateral mastectomy for breast cancer years ago. No recurrence. Complains of some cough and headaches. Overall she is stable.    Review of Systems   A 12 point comprehensive review of systems was negative except as noted..     Medications, Allergies and Problem List   Reviewed and updated             Chief Complaint   Dysuria (Nausea, unable to sleep, headaches, change in color of  urine, no frequency. )       Patient Profile   Social History     Social History Narrative    . 4 grown children. Lives in a senior complex apartment. Nonsmoker. Non alcohol drinker. Retired .        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       Medications and Allergies   Current Outpatient Medications   Medication Sig     C,E,zinc,copper 11-omega3s-lut (OCUVITE ADULT 50 PLUS) 250-5-1 mg cap Take 1 capsule by mouth daily.     CALCIUM & MAGNESIUM CARBONATES ORAL Take by mouth.     cholecalciferol, vitamin D3, (VITAMIN D3) 2,000 unit cap Take by mouth.     famotidine (PEPCID) 20 MG tablet TAKE 1 TABLET BY MOUTH AT BEDTIME     KRILL OIL ORAL Take 1 tablet by mouth daily.     meclizine (ANTIVERT) 12.5 mg tablet Take 1 tablet (12.5 mg total) by mouth 3 (three) times a day as needed for dizziness (lightheadedness).     melatonin 1 mg Tab tablet Take 3 tablets (3 mg total) by mouth at bedtime as needed.     MULTIVIT &MINERALS/FERROUS FUM (MULTI VITAMIN ORAL) Take by mouth.     OMEGA-3/DHA/EPA/FISH OIL (FISH OIL-OMEGA-3 FATTY ACIDS) 300-1,000 mg capsule Take by mouth daily.     omeprazole (PRILOSEC) 20 MG capsule TAKE 1 CAPSULE BY MOUTH BEFORE BREAKFAST     potassium chloride (KLOR-CON) 10 MEQ CR tablet Take 3 tablets (30 mEq total) by mouth daily.     sertraline (ZOLOFT) 25 MG tablet Take 1 tablet (25 mg total) by mouth daily.     traZODone (DESYREL) 100 MG tablet Take 1 tablet (100 mg total)  "by mouth at bedtime.     verapamiL (VERELAN PM) 180 MG 24 hr capsule Take 1 capsule (180 mg total) by mouth daily. Take 180 mg by mouth daily.     CRANBERRY EXTRACT ORAL Take by mouth.     sulfamethoxazole-trimethoprim (SEPTRA DS) 800-160 mg per tablet Take 1 tablet by mouth 2 (two) times a day for 7 days.     No Known Allergies     Family and Social History   No family history on file.     Social History     Tobacco Use     Smoking status: Never Smoker     Smokeless tobacco: Never Used   Substance Use Topics     Alcohol use: No     Drug use: No        Physical Exam       Physical Exam  /80   Pulse 77   Ht 5' 4\" (1.626 m)   LMP  (LMP Unknown)   SpO2 97%   BMI 28.32 kg/m    General appearance: alert, appears stated age, cooperative and no distress, on a wheelchair  Head: Normocephalic, without obvious abnormality, atraumatic  Throat: lips, mucosa, and tongue normal; teeth and gums normal  Neck: no adenopathy, no carotid bruit, no JVD, supple, symmetrical, trachea midline and thyroid not enlarged, symmetric, no tenderness/mass/nodules  Lungs: clear to auscultation bilaterally  Heart: regular rate and rhythm, S1, S2 normal, no murmur, click, rub or gallop  Abdomen: soft, non-tender; bowel sounds normal; no masses,  no organomegaly  Extremities: extremities normal, atraumatic, no cyanosis or edema  Skin: Skin color, texture, turgor normal. No rashes or lesions  Neurologic: Grossly normal     Additional Information   Post Discharge Medication Reconciliation Status: discharge medications reconciled, continue medications without change      Kevin Mckinley MD  Internal Medicine  Contact me at [phone]     Additional Information   Current Outpatient Medications   Medication Sig     C,E,zinc,copper 11-omega3s-lut (OCUVITE ADULT 50 PLUS) 250-5-1 mg cap Take 1 capsule by mouth daily.     CALCIUM & MAGNESIUM CARBONATES ORAL Take by mouth.     cholecalciferol, vitamin D3, (VITAMIN D3) 2,000 unit cap Take by " mouth.     famotidine (PEPCID) 20 MG tablet TAKE 1 TABLET BY MOUTH AT BEDTIME     KRILL OIL ORAL Take 1 tablet by mouth daily.     meclizine (ANTIVERT) 12.5 mg tablet Take 1 tablet (12.5 mg total) by mouth 3 (three) times a day as needed for dizziness (lightheadedness).     melatonin 1 mg Tab tablet Take 3 tablets (3 mg total) by mouth at bedtime as needed.     MULTIVIT &MINERALS/FERROUS FUM (MULTI VITAMIN ORAL) Take by mouth.     OMEGA-3/DHA/EPA/FISH OIL (FISH OIL-OMEGA-3 FATTY ACIDS) 300-1,000 mg capsule Take by mouth daily.     omeprazole (PRILOSEC) 20 MG capsule TAKE 1 CAPSULE BY MOUTH BEFORE BREAKFAST     potassium chloride (KLOR-CON) 10 MEQ CR tablet Take 3 tablets (30 mEq total) by mouth daily.     sertraline (ZOLOFT) 25 MG tablet Take 1 tablet (25 mg total) by mouth daily.     traZODone (DESYREL) 100 MG tablet Take 1 tablet (100 mg total) by mouth at bedtime.     verapamiL (VERELAN PM) 180 MG 24 hr capsule Take 1 capsule (180 mg total) by mouth daily. Take 180 mg by mouth daily.     CRANBERRY EXTRACT ORAL Take by mouth.     sulfamethoxazole-trimethoprim (SEPTRA DS) 800-160 mg per tablet Take 1 tablet by mouth 2 (two) times a day for 7 days.     No Known Allergies  Social History     Tobacco Use     Smoking status: Never Smoker     Smokeless tobacco: Never Used   Substance Use Topics     Alcohol use: No     Drug use: No

## 2021-06-28 ENCOUNTER — COMMUNICATION - HEALTHEAST (OUTPATIENT)
Dept: INTERNAL MEDICINE | Facility: CLINIC | Age: 86
End: 2021-06-28

## 2021-06-28 ENCOUNTER — COMMUNICATION - HEALTHEAST (OUTPATIENT)
Dept: SCHEDULING | Facility: CLINIC | Age: 86
End: 2021-06-28

## 2021-06-29 ENCOUNTER — COMMUNICATION - HEALTHEAST (OUTPATIENT)
Dept: INTERNAL MEDICINE | Facility: CLINIC | Age: 86
End: 2021-06-29

## 2021-07-04 NOTE — ADDENDUM NOTE
Addendum Note by Marbin Naranjo MD at 4/9/2021  2:02 PM     Author: Marbin Naranjo MD Service: -- Author Type: Physician    Filed: 4/9/2021  2:02 PM Encounter Date: 4/9/2021 Status: Signed    : Marbin Naranjo MD (Physician)    Addended by: MARBIN NARANJO on: 4/9/2021 02:02 PM        Modules accepted: Orders

## 2021-07-06 VITALS
DIASTOLIC BLOOD PRESSURE: 80 MMHG | SYSTOLIC BLOOD PRESSURE: 122 MMHG | BODY MASS INDEX: 28.32 KG/M2 | OXYGEN SATURATION: 97 % | HEIGHT: 64 IN | HEART RATE: 77 BPM

## 2021-07-07 NOTE — LETTER
Letter by Kevin Mckinley MD at      Author: Kevin Mckinley MD Service: -- Author Type: --    Filed:  Encounter Date: 6/29/2021 Status: (Other)         Nimo Concepcion  [address]  [place] [address]             June 29, 2021         Dear Ms. Concepcion,    Below are the results from your recent visit:    Resulted Orders   HM2(CBC w/o Differential)   Result Value Ref Range    WBC 11.0 4.0 - 11.0 thou/uL    RBC 5.07 3.80 - 5.40 mill/uL    Hemoglobin 15.9 12.0 - 16.0 g/dL    Hematocrit 48.1 (H) 35.0 - 47.0 %    MCV 95 80 - 100 fL    MCH 31.4 27.0 - 34.0 pg    MCHC 33.1 32.0 - 36.0 g/dL    RDW 13.3 11.0 - 14.5 %    Platelets 229 140 - 440 thou/uL    MPV 10.0 7.0 - 10.0 fL   Comprehensive Metabolic Panel   Result Value Ref Range    Sodium 137 136 - 145 mmol/L    Potassium 4.5 3.5 - 5.0 mmol/L    Chloride 103 98 - 107 mmol/L    CO2 19 (L) 22 - 31 mmol/L    Anion Gap, Calculation 15 5 - 18 mmol/L    Glucose 140 (H) 70 - 125 mg/dL    BUN 23 8 - 28 mg/dL    Creatinine 0.81 0.60 - 1.10 mg/dL    GFR MDRD Af Amer >60 >60 mL/min/1.73m2    GFR MDRD Non Af Amer >60 >60 mL/min/1.73m2    Bilirubin, Total 1.0 0.0 - 1.0 mg/dL    Calcium 9.7 8.5 - 10.5 mg/dL    Protein, Total 7.2 6.0 - 8.0 g/dL    Albumin 4.1 3.5 - 5.0 g/dL    Alkaline Phosphatase 90 45 - 120 U/L    AST 15 0 - 40 U/L    ALT 11 0 - 45 U/L    Narrative    Fasting Glucose reference range is 70-99 mg/dL per  American Diabetes Association (ADA) guidelines.       Increased blood sugar but rest of metabolic profile is normal. Also normal blood count. Nothing in the labs to explain your tiredness. Hence, being tired is due to your advanced age and deconditioning. Thanks.     Please call with questions or contact us using [facility].    Sincerely,        Electronically signed by Kevin Mckinley MD

## 2021-07-07 NOTE — TELEPHONE ENCOUNTER
Daughter Lesli is calling about a DNR.    She would like to know if she needs a DNR for her mother.     The best call back number is .  Requesting to speak to Dr. Mckinley.    Scheduled Virtual Telephone Appt for 06/30.    Requesting to speak to triage nurse.  Call transferred.

## 2021-07-07 NOTE — TELEPHONE ENCOUNTER
Daughter calls because she thinks her mom has another UTI. She just finished her antibiotics on 6-22-21. She has hematuria and nausea. Daughter has vitual appointment scheduled for 6-30-21, but needs one today. Transferred to a  for an appointment.Daughter verbalized understanding and agrees with plan.     Isabella Borrero RN  [facility] Nurse Advisor  1:56 PM 6/28/2021

## 2021-07-22 DIAGNOSIS — R11.0 NAUSEA: Primary | ICD-10-CM

## 2021-07-26 RX ORDER — ONDANSETRON 4 MG/1
TABLET, ORALLY DISINTEGRATING ORAL
Qty: 30 TABLET | Refills: 0 | Status: SHIPPED | OUTPATIENT
Start: 2021-07-26 | End: 2021-08-24

## 2021-07-26 NOTE — TELEPHONE ENCOUNTER
"Medication passed protocol:      Last Written Prescription Date:    ondansetron (ZOFRAN-ODT) 4 MG disintegrating tablet 30 tablet 0 6/24/2021  --   Sig - Route: Take 1 tablet (4 mg total) by mouth every 8 (eight) hours as needed for nausea. - Oral   Sent to pharmacy as: ondansetron 4 mg disintegrating tablet (ZOFRAN-ODT)   E-Prescribing Status: Receipt confirmed by pharmacy (6/24/2021 12:08 PM CDT)     Last office visit provider:  6/24/21     Requested Prescriptions   Pending Prescriptions Disp Refills     ondansetron (ZOFRAN-ODT) 4 MG ODT tab [Pharmacy Med Name: Ondansetron 4 MG Oral Tablet Disintegrating] 30 tablet 0     Sig: DISSOLVE 1 TABLET IN MOUTH EVERY 8 HOURS AS NEEDED FOR NAUSEA        Antivertigo/Antiemetic Agents Failed - 7/22/2021 11:43 AM        Failed - Medication is active on med list        Passed - Recent (12 mo) or future (30 days) visit within the authorizing provider's specialty     Patient has had an office visit with the authorizing provider or a provider within the authorizing providers department within the previous 12 mos or has a future within next 30 days. See \"Patient Info\" tab in inbasket, or \"Choose Columns\" in Meds & Orders section of the refill encounter.              Passed - Patient is 18 years of age or older             Elly Pope RN 07/26/21 4:24 PM  "

## 2021-08-24 ENCOUNTER — NURSE TRIAGE (OUTPATIENT)
Dept: NURSING | Facility: CLINIC | Age: 86
End: 2021-08-24

## 2021-08-24 DIAGNOSIS — R11.0 NAUSEA: ICD-10-CM

## 2021-08-24 RX ORDER — ONDANSETRON 4 MG/1
TABLET, ORALLY DISINTEGRATING ORAL
Qty: 30 TABLET | Refills: 0 | Status: SHIPPED | OUTPATIENT
Start: 2021-08-24 | End: 2021-09-14

## 2021-08-24 NOTE — TELEPHONE ENCOUNTER
Daughter calling. [facility] on file.     Daughter states she called earlier, asking for 2 prescriptions (one for nausea and an antibiotic for UTI). She called the pharmacy and was told that only the Zofran was filled.     Per chart review, request was sent to provider requesting Zofran and antibiotic.    Phone call to clinic. Per clinic staff, still waiting for provider to address request for antibiotic. Daughter states that patient is having a lot of burning with urination. She states it is hard for her to get to appointments and provider has been willing to order medication for UTI in the past.     Message sent to provider:  Daughter called again asking for a prescription for UTI for her mom.  Daughter can be reached at [phone].    Frances Laguerre RN 08/24/21 12:15 PM  [employer] Nurse Advisor    Reason for Disposition    Request for URGENT new prescription or refill of 'essential' medication (i.e., likelihood of harm to patient if not taken) and triager unable to fill per department policy    Additional Information    Negative: MORE THAN A DOUBLE DOSE of a prescription or over-the-counter (OTC) drug    Negative: DOUBLE DOSE (an extra dose or lesser amount) of over-the-counter (OTC) drug and any symptoms (e.g., dizziness, nausea, pain, sleepiness)    Negative: DOUBLE DOSE (an extra dose or lesser amount) of prescription drug and any symptoms (e.g., dizziness, nausea, pain, sleepiness)    Negative: Took another person's prescription drug    Negative: DOUBLE DOSE (an extra dose or lesser amount) of prescription drug and NO symptoms (Exception: a double dose of antibiotics)    Negative: Diabetes drug error or overdose (e.g., took wrong type of insulin or took extra dose)    Negative: Caller has medication question about med not prescribed by PCP and triager unable to answer question (e.g., compatibility with other med, storage)    Protocols used: MEDICATION QUESTION CALL-A-[place]    COVID 19 Nurse Triage Plan/Patient  "Instructions    Please be aware that novel coronavirus (COVID-19) may be circulating in the community. If you develop symptoms such as fever, cough, or SOB or if you have concerns about the presence of another infection including coronavirus (COVID-19), please contact your health care provider or visit [email or web address].     Disposition/Instructions    Additional COVID19 information to add for patients.   How can I protect others?  If you have symptoms (fever, cough, body aches or trouble breathing): Stay home and away from others (self-isolate) until:    At least 10 days have passed since your symptoms started, And     You ve had no fever--and no medicine that reduces fever--for 1 full day (24 hours), And      Your other symptoms have resolved (gotten better).     If you don t have symptoms, but a test showed that you have COVID-19 (you tested positive):    Stay home and away from others (self-isolate). Follow the tips under \"How do I self-isolate?\" below for 10 days (20 days if you have a weak immune system).    You don't need to be retested for COVID-19 before going back to school or work. As long as you're fever-free and feeling better, you can go back to school, work and other activities after waiting the 10 or 20 days.     How do I self-isolate?    Stay in your own room, even for meals. Use your own bathroom if you can.     Stay away from others in your home. No hugging, kissing or shaking hands. No visitors.    Don t go to work, school or anywhere else.     Clean  high touch  surfaces often (doorknobs, counters, handles, etc.). Use a household cleaning spray or wipes. You ll find a full list on the EPA website:  www.epa.gov/pesticide-registration/list-n-disinfectants-use-against-sars-cov-2.    Cover your mouth and nose with a mask, tissue or washcloth to avoid spreading germs.    Wash your hands and face often. Use soap and water.    Caregivers in these groups are at risk for severe illness due to " COVID-19:  o People 65 years and older  o People who live in a nursing home or long-term care facility  o People with chronic disease (lung, heart, cancer, diabetes, kidney, liver, immunologic)  o People who have a weakened immune system, including those who:  - Are in cancer treatment  - Take medicine that weakens the immune system, such as corticosteroids  - Had a bone marrow or organ transplant  - Have an immune deficiency  - Have poorly controlled HIV or AIDS  - Are obese (body mass index of 40 or higher)  - Smoke regularly    Caregivers should wear gloves while washing dishes, handling laundry and cleaning bedrooms and bathrooms.    Use caution when washing and drying laundry: Don t shake dirty laundry, and use the warmest water setting that you can.    For more tips, go to www.cdc.gov/coronavirus/2019-ncov/downloads/10Things.pdf.    How can I take care of myself?  1. Get lots of rest. Drink extra fluids (unless a doctor has told you not to).     2. Take Tylenol (acetaminophen) for fever or pain. If you have liver or kidney problems, ask your family doctor if it s okay to take Tylenol.     Adults can take either:     650 mg (two 325 mg pills) every 4 to 6 hours, or     1,000 mg (two 500 mg pills) every 8 hours as needed.     Note: Don t take more than 3,000 mg in one day.   Acetaminophen is found in many medicines (both prescribed and over-the-counter medicines). Read all labels to be sure you don t take too much.     For children, check the Tylenol bottle for the right dose. The dose is based on the child s age or weight.    3. If you have other health problems (like cancer, heart failure, an organ transplant or severe kidney disease): Call your specialty clinic if you don t feel better in the next 2 days.    4. Know when to call 911: Emergency warning signs include:    Trouble breathing or shortness of breath    Pain or pressure in the chest that doesn t go away    Feeling confused like you haven t felt  before, or not being able to wake up    Bluish-colored lips or face    What are the symptoms of COVID-19?     The most common symptoms are cough, fever and trouble breathing.     Less common symptoms include body aches, chills, diarrhea (loose, watery poops), fatigue (feeling very tired), headache, runny nose, sore throat and loss of smell.    COVID-19 can cause severe coughing (bronchitis) and lung infection (pneumonia).    How does it spread?     The virus may spread when a person coughs or sneezes into the air. The virus can travel about 6 feet this way, and it can live on surfaces.      Common  (household disinfectants) will kill the virus.    Who is at risk?  Anyone can catch COVID-19 if they re around someone who has the virus.    How can others protect themselves?     Stay away from people who have COVID-19 (or symptoms of COVID-19).    Wash hands often with soap and water. Or, use hand  with at least 60% alcohol.    Avoid touching the eyes, nose or mouth.     Wear a face mask when you go out in public, when sick or when caring for a sick person.    Where can I get more information?    [facility]: About COVID-19: [email or web address]    CDC: What to Do If You re Sick: www.cdc.gov/coronavirus/2019-ncov/about/steps-when-sick.html    CDC: Ending Home Isolation: www.cdc.gov/coronavirus/2019-ncov/hcp/disposition-in-home-patients.html     CDC: Caring for Someone: www.cdc.gov/coronavirus/2019-ncov/if-you-are-sick/care-for-someone.html     [facility]: Interim Guidance for Hospital Discharge to Home: www.health.[facility].[place]/diseases/coronavirus/hcp/hospdischarge.pdf    [facility] clinical trials (COVID-19 research studies): clinicalaffairs.[facility]/umn-clinical-trials     Below are the COVID-19 hotlines at the [place] Department of Health ([facility]). Interpreters are available.   o For health questions: Call 363-964-6105 or 1-481.808.3581 (7 a.m. to 7 p.m.)  o For questions about  schools and childcare: Call 586-099-2358 or 1-480.348.8928 (7 a.m. to 7 p.m.)          Thank you for taking steps to prevent the spread of this virus.  o Limit your contact with others.  o Wear a simple mask to cover your cough.  o Wash your hands well and often.    Resources    [facility]: About COVID-19: [email or web address]    CDC: What to Do If You're Sick: www.cdc.gov/coronavirus/2019-ncov/about/steps-when-sick.html    CDC: Ending Home Isolation: www.cdc.gov/coronavirus/2019-ncov/hcp/disposition-in-home-patients.html     CDC: Caring for Someone: www.cdc.gov/coronavirus/2019-ncov/if-you-are-sick/care-for-someone.html     [facility]: Interim Guidance for Hospital Discharge to Home: www.[facility].mn./diseases/coronavirus/hcp/hospdischarge.pdf    [facility] clinical trials (COVID-19 research studies): clinicalaffairs.[facility]-clinical-trials     Below are the COVID-19 hotlines at the [place] Department of Health ([facility]). Interpreters are available.   o For health questions: Call 508-674-5997 or 1-210.933.8962 (7 a.m. to 7 p.m.)  o For questions about schools and childcare: Call 417-710-4389 or 1-600.276.8833 (7 a.m. to 7 p.m.)

## 2021-08-24 NOTE — TELEPHONE ENCOUNTER
Telephone call:      Daughter called to report that mother has bladder infection.   Symptoms tired, nausea, orangish red urine, itchy, started about  3 days ago. Consent to communicate in chart.  Also requesting refill of Zofran. Also requesting antibiotic.    Mirian Bess RN   [facility] Nurse Advisor  9:55 AM 8/24/2021

## 2021-08-26 ENCOUNTER — NURSE TRIAGE (OUTPATIENT)
Dept: NURSING | Facility: CLINIC | Age: 86
End: 2021-08-26

## 2021-08-26 NOTE — TELEPHONE ENCOUNTER
Daughter expresses anger saying she has been calling to get pt an antibiotic for what she believes to be UTI and it has not yet been prescribed.  Sx are burning dysuria, flank pain, orangish red colore urine and increased fatigue. No fever. States she will not bring pt to clinic, UC or ER. Dtr states Dr Mckinley told her she could get treatment prescribed by phone.     Daughter then hung up the phone. Message routed to Dr Mckinley and his care team [place].     Pharmacy:  [facility], [place] [facility] [place] [facility]".     Reason for Disposition    Side (flank) or lower back pain present    Additional Information    Negative: Shock suspected (e.g., cold/pale/clammy skin, too weak to stand, low BP, rapid pulse)    Negative: Sounds like a life-threatening emergency to the triager    Negative: Followed a genital area injury    Negative: Taking antibiotic for urinary tract infection (UTI)    Negative: Pregnant    Negative: Postpartum (from 0 to 6 weeks after delivery)    Negative: [1] Unable to urinate (or only a few drops) > 4 hours AND [2] bladder feels very full (e.g., palpable bladder or strong urge to urinate)    Negative: Vomiting    Negative: Patient sounds very sick or weak to the triager    Negative: [1] SEVERE pain with urination  (e.g., excruciating) AND [2] not improved after 2 hours of pain medicine and Sitz bath    Negative: Fever > 100.4 F (38.0 C)    Protocols used: URINATION PAIN - FEMALE-A-

## 2021-09-01 NOTE — TELEPHONE ENCOUNTER
"Contacted patient's daughter Lesli.  Writer identified self and Lesli became angry and said \"you guys didn't do anything for her.\"  Writer then explained that PCP is requesting a urine be brought in, if positive will treat.  Lesli yelled at writer \"she has been so sick the past couple days.\"  Writer advised patient be seen in acute care setting such as UC or ED.  Patient then hung up on writer.  Will close encounter.  "

## 2021-09-14 DIAGNOSIS — R11.0 NAUSEA: ICD-10-CM

## 2021-09-14 RX ORDER — ONDANSETRON 4 MG/1
TABLET, ORALLY DISINTEGRATING ORAL
Qty: 30 TABLET | Refills: 0 | Status: SHIPPED | OUTPATIENT
Start: 2021-09-14

## 2021-09-15 ENCOUNTER — APPOINTMENT (OUTPATIENT)
Dept: LAB | Facility: CLINIC | Age: 86
End: 2021-09-15
Payer: COMMERCIAL

## 2021-09-15 DIAGNOSIS — Z53.9 ERRONEOUS ENCOUNTER--DISREGARD: Primary | ICD-10-CM

## 2021-09-23 ENCOUNTER — TELEPHONE (OUTPATIENT)
Dept: INTERNAL MEDICINE | Facility: CLINIC | Age: 86
End: 2021-09-23

## 2021-09-23 NOTE — TELEPHONE ENCOUNTER
Pt's daughter Lesli came into see if Dr. Waggoner would take on Her and her mother Nimo on as new pt;s they were both pt's of Dr. Mckinley call the cell number on file.

## 2021-10-14 ENCOUNTER — OFFICE VISIT (OUTPATIENT)
Dept: INTERNAL MEDICINE | Facility: CLINIC | Age: 86
End: 2021-10-14
Payer: COMMERCIAL

## 2021-10-14 VITALS
HEIGHT: 64 IN | BODY MASS INDEX: 26.5 KG/M2 | DIASTOLIC BLOOD PRESSURE: 72 MMHG | HEART RATE: 68 BPM | OXYGEN SATURATION: 98 % | SYSTOLIC BLOOD PRESSURE: 124 MMHG | WEIGHT: 155.2 LBS

## 2021-10-14 DIAGNOSIS — H49.9: ICD-10-CM

## 2021-10-14 DIAGNOSIS — K21.00 CHRONIC REFLUX ESOPHAGITIS: ICD-10-CM

## 2021-10-14 DIAGNOSIS — F51.01 PRIMARY INSOMNIA: ICD-10-CM

## 2021-10-14 DIAGNOSIS — C50.919 ADENOCARCINOMA OF BREAST, UNSPECIFIED LATERALITY (H): ICD-10-CM

## 2021-10-14 DIAGNOSIS — E03.9 HYPOTHYROIDISM, UNSPECIFIED TYPE: Primary | ICD-10-CM

## 2021-10-14 DIAGNOSIS — E78.00 PRIMARY HYPERCHOLESTEROLEMIA: ICD-10-CM

## 2021-10-14 DIAGNOSIS — F33.1 MODERATE RECURRENT MAJOR DEPRESSION (H): ICD-10-CM

## 2021-10-14 DIAGNOSIS — M81.0 OSTEOPOROSIS WITHOUT CURRENT PATHOLOGICAL FRACTURE, UNSPECIFIED OSTEOPOROSIS TYPE: ICD-10-CM

## 2021-10-14 DIAGNOSIS — I10 ESSENTIAL HYPERTENSION: ICD-10-CM

## 2021-10-14 DIAGNOSIS — E55.9 VITAMIN D DEFICIENCY: ICD-10-CM

## 2021-10-14 PROBLEM — N39.0 UTI (URINARY TRACT INFECTION): Status: RESOLVED | Noted: 2019-01-16 | Resolved: 2021-10-14

## 2021-10-14 PROBLEM — R53.83 OTHER MALAISE AND FATIGUE: Status: RESOLVED | Noted: 2019-01-16 | Resolved: 2021-10-14

## 2021-10-14 PROBLEM — M19.019 PRIMARY LOCALIZED OSTEOARTHROSIS OF SHOULDER REGION: Status: RESOLVED | Noted: 2019-01-16 | Resolved: 2021-10-14

## 2021-10-14 PROBLEM — F32.1 MODERATE MAJOR DEPRESSION (H): Status: RESOLVED | Noted: 2019-03-12 | Resolved: 2021-10-14

## 2021-10-14 PROBLEM — R53.81 OTHER MALAISE AND FATIGUE: Status: RESOLVED | Noted: 2019-01-16 | Resolved: 2021-10-14

## 2021-10-14 PROBLEM — R42 LIGHTHEADEDNESS: Status: RESOLVED | Noted: 2019-01-16 | Resolved: 2021-10-14

## 2021-10-14 PROBLEM — M25.519 PAIN IN JOINT, SHOULDER REGION: Status: RESOLVED | Noted: 2019-01-16 | Resolved: 2021-10-14

## 2021-10-14 PROBLEM — J18.9 CAP (COMMUNITY ACQUIRED PNEUMONIA): Status: RESOLVED | Noted: 2019-01-16 | Resolved: 2021-10-14

## 2021-10-14 PROCEDURE — G0008 ADMIN INFLUENZA VIRUS VAC: HCPCS | Performed by: INTERNAL MEDICINE

## 2021-10-14 PROCEDURE — 99215 OFFICE O/P EST HI 40 MIN: CPT | Mod: 25 | Performed by: INTERNAL MEDICINE

## 2021-10-14 PROCEDURE — 90662 IIV NO PRSV INCREASED AG IM: CPT | Performed by: INTERNAL MEDICINE

## 2021-10-14 RX ORDER — CHLORAL HYDRATE 500 MG
CAPSULE ORAL
COMMUNITY

## 2021-10-14 ASSESSMENT — MIFFLIN-ST. JEOR: SCORE: 1103.98

## 2021-10-14 NOTE — PROGRESS NOTES
Office Visit - New Patient   Nimo Concepcion   89 year old  female    Date of visit: 10/14/2021  Physician: Mannie Waggoner MD, MD     Assessment and Plan   1. Hypothyroidism, unspecified type  Most recent TSH okay, not on levothyroxine.  She had some surgery of her thyroid any decades ago, unclear if this was thyroid surgery or parathyroid surgery.  Check TSH with next lab    2. Adenocarcinoma of breast, unspecified laterality (H)  Status post bilateral mastectomy    3. Chronic reflux esophagitis  Stable    4. Moderate recurrent major depression (H)  Continue Celexa, stable    5. Primary hypercholesterolemia  No history of heart attack or stroke, not on a statin    6. Primary insomnia  Stable    7. Essential hypertension  Blood pressure okay, continue current medication    8. Vitamin D defciency  9. Osteoporosis without current pathological fracture, unspecified osteoporosis type  Noted, has been on bisphosphonate.  No vitamin D documented in the chart, can check with next labs.  Adequate calcium and vitamin D commended    10. Ocular palsy of right eye  Had MR, follows with ophthalmology    Return in about 3 months (around 1/14/2022) for Follow up.     Chief Complaint   Chief Complaint   Patient presents with     [facility] Care        Patient Profile   Social History     Social History Narrative    . 4 grown children. Lives in a senior complex apartment, near daughter Lesli Nonsmoker. Non alcohol drinker. Retired .        Past Medical History   Patient Active Problem List   Diagnosis     Hypothyroidism, unspecified type     Adenocarcinoma of breast (H)     Chronic reflux esophagitis     DISH (diffuse idiopathic skeletal hyperostosis)     Moderate recurrent major depression (H)     Osteoporosis     Primary hypercholesterolemia     Primary insomnia     Sciatica     Vitamin D deficiency     Essential hypertension       Past Surgical History  She has a past surgical history that includes  Mastectomy (Bilateral); Total Knee Arthroplasty (Left); Hysterectomy total abdominal, bilateral salpingo-oophorectomy, combined; and Cataract Extraction (Bilateral).     History of Present Illness   This 89 year old old woman comes in with her daughter to establish care.  Her medical history is reviewed, electronic medical record was updated as reflected in this note.  She lives in assisted living facility.  Her daughter lives in the same building.  She walks with a walker.  She has a history of hypertension, hyperlipidemia.  No history of heart attack or stroke.  She has had breast cancer with bilateral mastectomies.  She has previously had some low thyroid with recent numbers okay not on thyroid hormone replacement.  She has episodes of depression which is generally well controlled now.  She has been working with ophthalmology regarding a 3rd nerve palsy.  Underlying reflux which is well controlled    Review of Systems: A comprehensive review of systems was negative except as noted.     Medications and Allergies   Current Outpatient Medications   Medication Sig Dispense Refill     citalopram (CELEXA) 20 MG tablet Take 30 mg daily, or 1.5 pills, daily. 45 tablet 11     Multiple Vitamins-Minerals (OCUVITE ADULT 50+) CAPS Take one daily 30 capsule 11     Omega-3 1000 MG capsule        omeprazole (PRILOSEC) 20 MG DR capsule TAKE 1 CAPSULE BY MOUTH BEFORE BREAKFAST       ondansetron (ZOFRAN-ODT) 4 MG ODT tab DISSOLVE 1 TABLET IN MOUTH EVERY 8 HOURS AS NEEDED FOR NAUSEA 30 tablet 0     potassium chloride ER (MICRO-K) 10 MEQ CR capsule TAKE 3 CAPSULES BY MOUTH ONCE DAILY 30 capsule 11     verapamil ER (VERELAN) 180 MG 24 hr capsule Take 1 capsule by mouth once daily 30 capsule 11     No Known Allergies     Family and Social History   Family History   Problem Relation Age of Onset     No Known Problems Daughter      No Known Problems Daughter      No Known Problems Son      No Known Problems Son         Social History  "    Tobacco Use     Smoking status: Never Smoker     Smokeless tobacco: Never Used   Substance Use Topics     Alcohol use: No     Drug use: No        Physical Exam   General Appearance:   No acute distress    /72 (BP Location: Right arm, Patient Position: Sitting, Cuff Size: Adult Regular)   Pulse 68   Ht 1.626 m (5' 4\")   Wt 70.4 kg (155 lb 3.2 oz)   SpO2 98%   BMI 26.64 kg/m      EYES: Eyelids, conjunctiva, and sclera were normal. Pupils were normal. Cornea, iris, and lens were normal bilaterally.  HEAD, EARS, NOSE, MOUTH, AND THROAT: Head and face were normal. Hearing was normal to voice and the ears were normal to external exam. Nose appearance was normal and there was no discharge. Oropharynx was normal.  NECK: Neck appearance was normal. There were no neck masses and the thyroid was not enlarged.  RESPIRATORY: Breathing pattern was normal and the chest moved symmetrically.  Percussion/auscultatory percussion was normal.  Lung sounds were normal and there were no abnormal sounds.  CARDIOVASCULAR: Heart rate and rhythm were normal.  S1 and S2 were normal and there were no extra sounds or murmurs. Peripheral pulses in arms and legs were normal.  Jugular venous pressure was normal.  There was no peripheral edema.  GASTROINTESTINAL: The abdomen was normal in contour.  Bowel sounds were present.  Percussion detected no organ enlargement or tenderness.  Palpation detected no tenderness, mass, or enlarged organs.   MUSCULOSKELETAL: Skeletal configuration was normal and muscle mass was normal for age. Joint appearance was overall normal.  LYMPHATIC: There were no enlarged nodes.  SKIN/HAIR/NAILS: Skin color was normal.  There were no skin lesions.  Hair and nails were normal.  NEUROLOGIC: The patient was alert and oriented to person, place, time, and circumstance. Speech was normal.The patient was normally coordinated.  PSYCHIATRIC:  Mood and affect were normal and the patient had normal recent and remote " memory. The patient's judgment and insight were normal.       Additional Information   Time: 40 minutes including chart review, history, examination, counseling, coordination of care and documentation     Mannie Waggoner MD, MD  Internal Medicine  Contact me at [phone]

## 2021-10-19 PROBLEM — F32.9 MAJOR DEPRESSION: Status: RESOLVED | Noted: 2019-03-12 | Resolved: 2021-10-14

## 2022-02-24 ENCOUNTER — OFFICE VISIT (OUTPATIENT)
Dept: FAMILY MEDICINE | Facility: CLINIC | Age: 87
End: 2022-02-24
Payer: COMMERCIAL

## 2022-02-24 VITALS
RESPIRATION RATE: 18 BRPM | DIASTOLIC BLOOD PRESSURE: 82 MMHG | BODY MASS INDEX: 26.64 KG/M2 | HEART RATE: 76 BPM | OXYGEN SATURATION: 96 % | WEIGHT: 155.2 LBS | SYSTOLIC BLOOD PRESSURE: 141 MMHG | TEMPERATURE: 97.6 F

## 2022-02-24 DIAGNOSIS — N30.90 BLADDER INFECTION: Primary | ICD-10-CM

## 2022-02-24 LAB
ALBUMIN UR-MCNC: NEGATIVE MG/DL
APPEARANCE UR: ABNORMAL
BACTERIA #/AREA URNS HPF: ABNORMAL /HPF
BILIRUB UR QL STRIP: NEGATIVE
COLOR UR AUTO: YELLOW
GLUCOSE UR STRIP-MCNC: NEGATIVE MG/DL
HGB UR QL STRIP: NEGATIVE
KETONES UR STRIP-MCNC: NEGATIVE MG/DL
LEUKOCYTE ESTERASE UR QL STRIP: ABNORMAL
MUCOUS THREADS #/AREA URNS LPF: PRESENT /LPF
NITRATE UR QL: POSITIVE
PH UR STRIP: 5.5 [PH] (ref 5–8)
RBC #/AREA URNS AUTO: ABNORMAL /HPF
SP GR UR STRIP: >=1.03 (ref 1–1.03)
SQUAMOUS #/AREA URNS AUTO: ABNORMAL /LPF
UROBILINOGEN UR STRIP-ACNC: 1 E.U./DL
WBC #/AREA URNS AUTO: ABNORMAL /HPF
WBC CLUMPS #/AREA URNS HPF: PRESENT /HPF

## 2022-02-24 PROCEDURE — 81001 URINALYSIS AUTO W/SCOPE: CPT

## 2022-02-24 PROCEDURE — 99213 OFFICE O/P EST LOW 20 MIN: CPT | Performed by: FAMILY MEDICINE

## 2022-02-24 PROCEDURE — 87186 SC STD MICRODIL/AGAR DIL: CPT | Performed by: FAMILY MEDICINE

## 2022-02-24 PROCEDURE — 87086 URINE CULTURE/COLONY COUNT: CPT | Performed by: FAMILY MEDICINE

## 2022-02-24 RX ORDER — SULFAMETHOXAZOLE/TRIMETHOPRIM 800-160 MG
1 TABLET ORAL 2 TIMES DAILY
Qty: 14 TABLET | Refills: 0 | Status: SHIPPED | OUTPATIENT
Start: 2022-02-24 | End: 2022-03-03

## 2022-02-24 NOTE — PROGRESS NOTES
OUTPATIENT VISIT NOTE                                                   Date of Visit: 2/24/2022     Chief Complaint   Patient presents with:  Urinary Problem: Orange/red urine x few weeks - burping alot             History of Present Illness   Nimo Concepcion is a 89 year old female with daughter c/o dysuria for a few weeks.  Some urgency  No stomach pain.  No fever.  Normal Appetite       MEDICATIONS   Current Outpatient Medications   Medication     Multiple Vitamins-Minerals (OCUVITE ADULT 50+) CAPS     Omega-3 1000 MG capsule     omeprazole (PRILOSEC) 20 MG DR capsule     ondansetron (ZOFRAN-ODT) 4 MG ODT tab     potassium chloride ER (MICRO-K) 10 MEQ CR capsule     sulfamethoxazole-trimethoprim (BACTRIM DS) 800-160 MG tablet     verapamil ER (VERELAN) 180 MG 24 hr capsule     citalopram (CELEXA) 20 MG tablet     No current facility-administered medications for this visit.         SOCIAL HISTORY   Social History     Tobacco Use     Smoking status: Never Smoker     Smokeless tobacco: Never Used   Substance Use Topics     Alcohol use: No           Physical Exam   Vitals:    02/24/22 1600   BP: (!) 141/82   Pulse: 76   Resp: 18   Temp: 97.6  F (36.4  C)   TempSrc: Tympanic   SpO2: 96%   Weight: 70.4 kg (155 lb 3.3 oz)        GEN:  NAD  ABD:  Soft, nontender,   BACK:  No CVA tenderness     Diagnostic Studies   LABS:  Results for orders placed or performed in visit on 02/24/22   UA macro with reflex to Microscopic and Culture - Clinc Collect     Status: Abnormal    Specimen: Urine, Clean Catch   Result Value Ref Range    Color Urine Yellow Colorless, Straw, Light Yellow, Yellow    Appearance Urine Slightly Cloudy (A) Clear    Glucose Urine Negative Negative mg/dL    Bilirubin Urine Negative Negative    Ketones Urine Negative Negative mg/dL    Specific Gravity Urine >=1.030 1.005 - 1.030    Blood Urine Negative Negative    pH Urine 5.5 5.0 - 8.0    Protein Albumin Urine Negative Negative mg/dL    Urobilinogen Urine  1.0 0.2, 1.0 E.U./dL    Nitrite Urine Positive (A) Negative    Leukocyte Esterase Urine Small (A) Negative   Urine Microscopic Exam     Status: Abnormal   Result Value Ref Range    Bacteria Urine Many (A) None Seen /HPF    RBC Urine 0-2 0-2 /HPF /HPF    WBC Urine  (A) 0-5 /HPF /HPF    Squamous Epithelials Urine Few (A) None Seen /LPF    WBC Clumps Urine Present (A) None Seen /HPF    Mucus Urine Present (A) None Seen /LPF            Assessment and Plan     Bladder infection  Antibiotics as directed.  Good fluid intake.  Cranberry juice.  F/U if no improvement or worsening.   - UA macro with reflex to Microscopic and Culture - Clinc Collect  - Urine Microscopic Exam  - Urine Culture  - sulfamethoxazole-trimethoprim (BACTRIM DS) 800-160 MG tablet  Dispense: 14 tablet; Refill: 0                   Discussed signs / symptoms that warrant urgent / emergent medical attention.     Recheck if worsening or not improving.       Brandon Rowe MD          Pertinent History     The following portions of the patient's history were reviewed and updated as appropriate: allergies, current medications, past family history, past medical history, past social history, past surgical history and problem list.

## 2022-02-26 LAB — BACTERIA UR CULT: ABNORMAL

## 2022-04-11 DIAGNOSIS — I10 ESSENTIAL HYPERTENSION: ICD-10-CM

## 2022-04-13 NOTE — TELEPHONE ENCOUNTER
"Routing refill request to provider for review/approval because:  Labs out of range:  BP    Last Written Prescription Date:  3/18/21  Last Fill Quantity: 30,  # refills: 11   Last office visit provider:  2/24/22       Requested Prescriptions   Pending Prescriptions Disp Refills     verapamil ER (VERELAN) 180 MG 24 hr capsule [Pharmacy Med Name: Verapamil HCl  MG Oral Capsule Extended Release 24 Hour] 30 capsule 0     Sig: Take 1 capsule by mouth once daily       Calcium Channel Blockers Protocol  Failed - 4/11/2022  2:19 PM        Failed - Blood pressure under 140/90 in past 12 months     BP Readings from Last 3 Encounters:   02/24/22 (!) 141/82   10/14/21 124/72   06/24/21 110/72                 Passed - Normal ALT in past 12 months     Recent Labs   Lab Test 06/24/21  1214   ALT 11             Passed - Recent (12 mo) or future (30 days) visit within the authorizing provider's specialty     Patient has had an office visit with the authorizing provider or a provider within the authorizing providers department within the previous 12 mos or has a future within next 30 days. See \"Patient Info\" tab in inbasket, or \"Choose Columns\" in Meds & Orders section of the refill encounter.              Passed - Medication is active on med list        Passed - Patient is age 18 or older        Passed - No active pregnancy on record        Passed - Normal serum creatinine on file in past 12 months     Recent Labs   Lab Test 06/24/21  1214   CR 0.81       Ok to refill medication if creatinine is low          Passed - No positive pregnancy test in past 12 months             Danica Schneider RN 04/13/22 3:16 PM  "

## 2022-04-14 DIAGNOSIS — E87.6 HYPOKALEMIA: Primary | ICD-10-CM

## 2022-04-14 DIAGNOSIS — K21.9 GASTROESOPHAGEAL REFLUX DISEASE WITHOUT ESOPHAGITIS: ICD-10-CM

## 2022-04-14 RX ORDER — POTASSIUM CHLORIDE 750 MG/1
TABLET, EXTENDED RELEASE ORAL
Qty: 90 TABLET | Refills: 0 | Status: SHIPPED | OUTPATIENT
Start: 2022-04-14 | End: 2022-04-25

## 2022-04-14 RX ORDER — VERAPAMIL HYDROCHLORIDE 180 MG/1
CAPSULE, EXTENDED RELEASE ORAL
Qty: 30 CAPSULE | Refills: 0 | Status: SHIPPED | OUTPATIENT
Start: 2022-04-14 | End: 2022-04-26

## 2022-04-14 NOTE — TELEPHONE ENCOUNTER
Omeprazole:   Routing refill request to provider for review/approval because:  Failed - No diagnosis of osteoporosis on record  Due to medication information not transferring due to [facility] please review the medication information prior to signing to ensure accuracy.    Last Written Prescription:  Last office visit provider:  2/24/2022     Outpatient Medication Detail     Disp Refills Start End GUDELIA   omeprazole (PRILOSEC) 20 MG capsule 90 capsule 3 5/15/2021  No   Sig: TAKE 1 CAPSULE BY MOUTH BEFORE BREAKFAST   Sent to pharmacy as: omeprazole 20 mg capsule,delayed release (PriLOSEC)   E-Prescribing Status: Receipt confirmed by pharmacy (5/15/2021 11:24 AM CDT)       omeprazole (PRILOSEC) 20 MG capsule [[id number]]    Electronically signed by: Smitha White RN on 05/15/21 1124 Status: Active   Ordering user: Smitha White RN 05/15/21 1124 Ordering provider: Kevin Mckinley MD   Authorized by: Kevin Mckinley MD   Frequency:  05/15/21 - Until Discontinued Released by: Smitha White RN 05/15/21 1124   Diagnoses  Gastroesophageal reflux disease without esophagitis [K21.9]       Potassium Chloride:   Routing refill request to provider for review/approval because:  Potential break in therapy. Please review.    Last Written Prescription Date:  10/21/2020  Last Fill Quantity: 30,  # refills: 11   Last office visit provider:  2/24/2022     Requested Prescriptions   Pending Prescriptions Disp Refills     omeprazole (PRILOSEC) 20 MG DR capsule [Pharmacy Med Name: Omeprazole 20 MG Oral Capsule Delayed Release] 90 capsule 0     Sig: TAKE 1 CAPSULE BY MOUTH BEFORE BREAKFAST       PPI Protocol Failed - 4/14/2022  1:19 PM        Failed - No diagnosis of osteoporosis on record        Passed - Not on Clopidogrel (unless Pantoprazole ordered)        Passed - Recent (12 mo) or future (30 days) visit within the authorizing provider's specialty     Patient has had an office visit with the authorizing provider or a  "provider within the authorizing providers department within the previous 12 mos or has a future within next 30 days. See \"Patient Info\" tab in inbasket, or \"Choose Columns\" in Meds & Orders section of the refill encounter.              Passed - Medication is active on med list        Passed - Patient is age 18 or older        Passed - No active pregnacy on record        Passed - No positive pregnancy test in past 12 months               potassium chloride ER (K-TAB/KLOR-CON) 10 MEQ CR tablet [Pharmacy Med Name: Potassium Chloride ER 10 MEQ Oral Tablet Extended Release] 90 tablet 0     Sig: Take 3 tablets by mouth once daily       Potassium Supplements Protocol Passed - 4/14/2022 10:20 AM        Passed - Recent (12 mo) or future (30 days) visit within the authorizing provider's department     Patient has had an office visit with the authorizing provider or a provider within the authorizing providers department within the previous 12 mos or has a future within next 30 days. See \"Patient Info\" tab in inbasket, or \"Choose Columns\" in Meds & Orders section of the refill encounter.              Passed - Medication is active on med list        Passed - Patient is age 18 or older        Passed - Normal serum potassium in past 12 months     Recent Labs   Lab Test 06/24/21  1214   POTASSIUM 4.5                         Kaye Corona RN 04/14/22 1:19 PM  "

## 2022-04-22 NOTE — PROGRESS NOTES
Assessment & Plan       ICD-10-CM    1. Dysuria  R30.0 UA with Microscopic reflex to Culture - lab collect     Urine Microscopic     Urine Culture   2. Encounter for immunization  Z23 COVID-19,PF,MODERNA (18+ YRS BOOSTER .25ML)   3. Generalized muscle weakness  M62.81 Echocardiogram Complete   4. Confusion  R41.0 Comprehensive metabolic panel (BMP + Alb, Alk Phos, ALT, AST, Total. Bili, TP)     CBC with platelets     TSH   5. Falls frequently  R29.6 Comprehensive metabolic panel (BMP + Alb, Alk Phos, ALT, AST, Total. Bili, TP)     CBC with platelets     TSH     Echocardiogram Complete   6. Dependent on walker for ambulation  Z99.89    7. Severe episode of recurrent major depressive disorder, without psychotic features (H)  F33.2 sertraline (ZOLOFT) 25 MG tablet   8. History of skin cancer  Z85.828 Adult Dermatology Referral   9. Heart murmur  R01.1 Echocardiogram Complete   10. Hearing loss, unspecified hearing loss type, unspecified laterality  H91.90    11. History of recurrent UTI (urinary tract infection)  Z87.440      Fall  Weakness  Concern for UTI  On chart review, patient does appear to get UTIs quite frequently.  It usually does result in generalized muscle weakness and falls.  Today, however UA is not convincing for UTI.  She has mild leukocyte Estrace but no nitrites.  Will await for urine culture.  We will do some baseline blood work to check for alternate organic etiologies of her falls/weakness.  Did discuss physical therapy but both patient and daughter declined this.  Also has a aortic murmur, presumed aortic stenosis on exam.  Daughter was unaware of this.  I do not see a murmur documented elsewhere in the chart.  Progressive/severe aortic stenosis may also be causing falls/weakness.  We will pursue echo today.  Monitor for fevers or changes in mentation acutely at home.    Medication management:  Difficult to know which medication she is still on at home.  At this time, we will refill her  verapamil.  Will hold off on the omeprazole as it sounds like she ran out of it 2 weeks ago and has been doing fine off of it.  Has history of osteoporosis and if we can discontinue long-term PPI usage, that would be beneficial.  We will recheck potassium and see if she needs continued potassium chloride supplementation.    MDD  LUISA  Hard of hearing  Murmur  Patient is extremely hard of hearing and feels isolated because of that  A recent death in the family has also made her depression worse  Was historically on Celexa but is not seem to be helping that much  Today, we will have to change her SSRI to Zoloft and follow-up in 6  Did discuss possibly getting a hearing study done but daughter declines at this time.    Hypokalemia  Patient has history of hypokalemia 2/2 recurrent nausea/emesis?  And poor oral intake?  We will check potassium levels today to help decide whether patient needs to be on supplementation.  Per daughter, patient has been out of the medication for about 2 weeks now.    Skin lesion  Has what appears to be AK's on her face and neck.  Has history of skin cancer.  Advised her to follow-up with dermatology.  Referral placed today.  Daughter is aware that this is an external referral and she needs to call for an appointment.    COVID vaccine ordered but not given by nursing, unclear why. Order cancelled    40 minutes spent on the date of the encounter doing chart review, history and exam, documentation and further activities per the note    Return in about 6 months (around 10/25/2022) for Falls/mood .    Lesvia Jarquin DO  [facility] TRUNG Suero is a 89 year old who presents for the following health issues: weakness accompanied by her daughter.    Patient is a 89-year-old female with PMH of GERD, hypothyroidism, MDD, osteoporosis, essential hypertension,, osteoporosis, vitamin D deficiency history of breast cancer s/p bilateral mastectomy and sciatica who presents  "today for establish care/concern for UTI.    Patient used to see Dr. Mckinley who is now retired     Today, patient is accompanied by her daughter.  Patient is quite hard of hearing and is not really speaking for herself.  Daughter is providing most of the history    However, daughter is quite tangential and it is really difficult to figure out exactly what their concerns are.    Daughter notes that mom is slowing down \"more and more\".  Uses walker at baseline but recently had a fall backwards where the daughter caught her.  Is eating well but seems to be getting weaker.  In the past, when she has presented like this-she usually has a UTI.  Feels that her urine has been looking more orange and cloudier    She also ran out of \"all of her medications\" about 2 weeks ago.  Then, she notes that she does have \"some medications\" at home but does not recall which ones.  Says that she has been off of the Prilosec for about 2 weeks without any difficulties.  But then notes that she is having intermittent vomiting.  No triggers associated with the vomiting.  Will occur about once every few weeks.  Is on chronic ondansetron for this?    When she is talking to her mother, once in a while her mom will have a sharp inhale.  She is worried about her breathing but she cannot hear any wheezing.  Her mother does not endorse any shortness of breath.    Daughter notes that patient's depression has been really bad recently because one of the family members recently committed suicide.  This seemed to have triggered her mom.  Was on the Celexa but then ran out of it.  Daughter feels that Celexa was not working that well when she was on it.  Is interested in switching medications.    Today, as noted below-history gathering was somewhat difficult.    Review of Systems   As per HPI       Objective    /66 (BP Location: Right arm, Patient Position: Sitting, Cuff Size: Adult Regular)   Pulse 68   Resp 16   There is no height or weight on " file to calculate BMI.  Physical Exam   GENERAL: elderly, frail, wheelchair dependent, hard of hearing  EYES: Eyes grossly normal to inspection, PERRL and conjunctivae and sclerae normal  NECK: no adenopathy, no asymmetry, masses, or scars and thyroid normal to palpation  RESP: lungs clear to auscultation - no rales, rhonchi or wheezes  CV: regular rate and rhythm, normal S1 S2, no S3 or S4, no murmur, click or rub, no peripheral edema and peripheral pulses strong  ABDOMEN: soft, nontender, no hepatosplenomegaly, no masses and bowel sounds normal  PSYCH: mentation appears normal, affect flat

## 2022-04-25 ENCOUNTER — OFFICE VISIT (OUTPATIENT)
Dept: FAMILY MEDICINE | Facility: CLINIC | Age: 87
End: 2022-04-25
Payer: COMMERCIAL

## 2022-04-25 VITALS — DIASTOLIC BLOOD PRESSURE: 66 MMHG | RESPIRATION RATE: 16 BRPM | HEART RATE: 68 BPM | SYSTOLIC BLOOD PRESSURE: 122 MMHG

## 2022-04-25 DIAGNOSIS — R01.1 HEART MURMUR: ICD-10-CM

## 2022-04-25 DIAGNOSIS — Z87.440 HISTORY OF RECURRENT UTI (URINARY TRACT INFECTION): ICD-10-CM

## 2022-04-25 DIAGNOSIS — Z23 ENCOUNTER FOR IMMUNIZATION: ICD-10-CM

## 2022-04-25 DIAGNOSIS — R29.6 FALLS FREQUENTLY: ICD-10-CM

## 2022-04-25 DIAGNOSIS — R30.0 DYSURIA: Primary | ICD-10-CM

## 2022-04-25 DIAGNOSIS — F33.2 SEVERE EPISODE OF RECURRENT MAJOR DEPRESSIVE DISORDER, WITHOUT PSYCHOTIC FEATURES (H): ICD-10-CM

## 2022-04-25 DIAGNOSIS — M62.81 GENERALIZED MUSCLE WEAKNESS: ICD-10-CM

## 2022-04-25 DIAGNOSIS — Z85.828 HISTORY OF SKIN CANCER: ICD-10-CM

## 2022-04-25 DIAGNOSIS — H91.90 HEARING LOSS, UNSPECIFIED HEARING LOSS TYPE, UNSPECIFIED LATERALITY: ICD-10-CM

## 2022-04-25 DIAGNOSIS — Z99.89 DEPENDENT ON WALKER FOR AMBULATION: ICD-10-CM

## 2022-04-25 DIAGNOSIS — R41.0 CONFUSION: ICD-10-CM

## 2022-04-25 LAB
ALBUMIN SERPL-MCNC: 3.8 G/DL (ref 3.5–5)
ALBUMIN UR-MCNC: NEGATIVE MG/DL
ALP SERPL-CCNC: 85 U/L (ref 45–120)
ALT SERPL W P-5'-P-CCNC: 10 U/L (ref 0–45)
ANION GAP SERPL CALCULATED.3IONS-SCNC: 11 MMOL/L (ref 5–18)
APPEARANCE UR: CLEAR
AST SERPL W P-5'-P-CCNC: 14 U/L (ref 0–40)
BACTERIA #/AREA URNS HPF: ABNORMAL /HPF
BILIRUB SERPL-MCNC: 0.9 MG/DL (ref 0–1)
BILIRUB UR QL STRIP: NEGATIVE
BUN SERPL-MCNC: 19 MG/DL (ref 8–28)
CALCIUM SERPL-MCNC: 10.1 MG/DL (ref 8.5–10.5)
CHLORIDE BLD-SCNC: 103 MMOL/L (ref 98–107)
CO2 SERPL-SCNC: 24 MMOL/L (ref 22–31)
COLOR UR AUTO: YELLOW
CREAT SERPL-MCNC: 0.64 MG/DL (ref 0.6–1.1)
ERYTHROCYTE [DISTWIDTH] IN BLOOD BY AUTOMATED COUNT: 12.7 % (ref 10–15)
GFR SERPL CREATININE-BSD FRML MDRD: 82 ML/MIN/1.73M2
GLUCOSE BLD-MCNC: 128 MG/DL (ref 70–125)
GLUCOSE UR STRIP-MCNC: NEGATIVE MG/DL
HCT VFR BLD AUTO: 46.4 % (ref 35–47)
HGB BLD-MCNC: 15.3 G/DL (ref 11.7–15.7)
HGB UR QL STRIP: NEGATIVE
KETONES UR STRIP-MCNC: NEGATIVE MG/DL
LEUKOCYTE ESTERASE UR QL STRIP: ABNORMAL
MCH RBC QN AUTO: 31.7 PG (ref 26.5–33)
MCHC RBC AUTO-ENTMCNC: 33 G/DL (ref 31.5–36.5)
MCV RBC AUTO: 96 FL (ref 78–100)
NITRATE UR QL: NEGATIVE
PH UR STRIP: 5.5 [PH] (ref 5–8)
PLATELET # BLD AUTO: 195 10E3/UL (ref 150–450)
POTASSIUM BLD-SCNC: 4.6 MMOL/L (ref 3.5–5)
PROT SERPL-MCNC: 6.7 G/DL (ref 6–8)
RBC # BLD AUTO: 4.83 10E6/UL (ref 3.8–5.2)
RBC #/AREA URNS AUTO: ABNORMAL /HPF
SODIUM SERPL-SCNC: 138 MMOL/L (ref 136–145)
SP GR UR STRIP: >=1.03 (ref 1–1.03)
SQUAMOUS #/AREA URNS AUTO: ABNORMAL /LPF
TSH SERPL DL<=0.005 MIU/L-ACNC: 0.59 UIU/ML (ref 0.3–5)
UROBILINOGEN UR STRIP-ACNC: 0.2 E.U./DL
WBC # BLD AUTO: 9.9 10E3/UL (ref 4–11)
WBC #/AREA URNS AUTO: ABNORMAL /HPF
YEAST #/AREA URNS HPF: ABNORMAL /HPF

## 2022-04-25 PROCEDURE — 81001 URINALYSIS AUTO W/SCOPE: CPT | Performed by: STUDENT IN AN ORGANIZED HEALTH CARE EDUCATION/TRAINING PROGRAM

## 2022-04-25 PROCEDURE — 99215 OFFICE O/P EST HI 40 MIN: CPT | Performed by: STUDENT IN AN ORGANIZED HEALTH CARE EDUCATION/TRAINING PROGRAM

## 2022-04-25 PROCEDURE — 80053 COMPREHEN METABOLIC PANEL: CPT | Performed by: STUDENT IN AN ORGANIZED HEALTH CARE EDUCATION/TRAINING PROGRAM

## 2022-04-25 PROCEDURE — 87086 URINE CULTURE/COLONY COUNT: CPT | Performed by: STUDENT IN AN ORGANIZED HEALTH CARE EDUCATION/TRAINING PROGRAM

## 2022-04-25 PROCEDURE — 36415 COLL VENOUS BLD VENIPUNCTURE: CPT | Performed by: STUDENT IN AN ORGANIZED HEALTH CARE EDUCATION/TRAINING PROGRAM

## 2022-04-25 PROCEDURE — 85027 COMPLETE CBC AUTOMATED: CPT | Performed by: STUDENT IN AN ORGANIZED HEALTH CARE EDUCATION/TRAINING PROGRAM

## 2022-04-25 PROCEDURE — 84443 ASSAY THYROID STIM HORMONE: CPT | Performed by: STUDENT IN AN ORGANIZED HEALTH CARE EDUCATION/TRAINING PROGRAM

## 2022-04-25 RX ORDER — SERTRALINE HYDROCHLORIDE 25 MG/1
25 TABLET, FILM COATED ORAL DAILY
Qty: 60 TABLET | Refills: 0 | Status: SHIPPED | OUTPATIENT
Start: 2022-04-25 | End: 2022-06-03

## 2022-04-25 ASSESSMENT — PAIN SCALES - GENERAL: PAINLEVEL: NO PAIN (0)

## 2022-04-26 ENCOUNTER — TELEPHONE (OUTPATIENT)
Dept: FAMILY MEDICINE | Facility: CLINIC | Age: 87
End: 2022-04-26
Payer: COMMERCIAL

## 2022-04-26 DIAGNOSIS — I10 ESSENTIAL HYPERTENSION: ICD-10-CM

## 2022-04-26 DIAGNOSIS — E87.6 HYPOKALEMIA: ICD-10-CM

## 2022-04-26 DIAGNOSIS — F33.2 SEVERE EPISODE OF RECURRENT MAJOR DEPRESSIVE DISORDER, WITHOUT PSYCHOTIC FEATURES (H): ICD-10-CM

## 2022-04-26 LAB — BACTERIA UR CULT: NORMAL

## 2022-04-26 NOTE — TELEPHONE ENCOUNTER
----- Message from Lesvia Jarquin DO sent at 4/26/2022  6:14 AM CDT -----  Call daughter,     No obvious cause of weakness/dizziness identified on bloodwork. Will see what the echo shows. Let us know if you don't hear from them. Will call you if urine culture is growing anything.

## 2022-04-26 NOTE — TELEPHONE ENCOUNTER
Spoke with patients daughter regarding results.    She did state her mom needs all her medications refilled.    Medications have been leonor'd up

## 2022-04-29 ENCOUNTER — TELEPHONE (OUTPATIENT)
Dept: NURSING | Facility: CLINIC | Age: 87
End: 2022-04-29
Payer: COMMERCIAL

## 2022-04-29 DIAGNOSIS — K59.01 SLOW TRANSIT CONSTIPATION: ICD-10-CM

## 2022-04-29 DIAGNOSIS — R31.9 HEMATURIA, UNSPECIFIED TYPE: ICD-10-CM

## 2022-04-29 DIAGNOSIS — R39.89 DARK YELLOW-COLORED URINE: Primary | ICD-10-CM

## 2022-04-29 RX ORDER — POTASSIUM CHLORIDE 750 MG/1
CAPSULE, EXTENDED RELEASE ORAL
Qty: 90 CAPSULE | Refills: 1 | Status: SHIPPED | OUTPATIENT
Start: 2022-04-29

## 2022-04-29 RX ORDER — VERAPAMIL HYDROCHLORIDE 180 MG/1
CAPSULE, EXTENDED RELEASE ORAL
Qty: 90 CAPSULE | Refills: 1 | Status: SHIPPED | OUTPATIENT
Start: 2022-04-29

## 2022-04-29 NOTE — TELEPHONE ENCOUNTER
"Pt's daughter, Lesli is calling.  Consent on file.    She was seen on Monday.  Calling for urine test results.  Urine was dark orange. Still very orange.  She is very upset that no one called her with the results.  I reviewed the physicians notes under her lab work. She was to be called if the urine culture was positive.  Urinalysis showed that urine was yellow in color and clear. May need to increase fluids as specific gravity was more than 1.030. I advised her that the urine cluture did not grow any bacteria and was negative. She then said \"what does that have to do with anything\" and abruptly hung up on me. Was upset that she had to wait for 20 min to speak to a nurse as well, although the line for the nurse was a 10 min hold time.     Please call her back as she was wondering why her urine is dark orange in color.  It is that way despite a negative urine culture. She stated that she was not on any new medications such as pyridium.    No [employer]. Do not send messages via [employer].    Roslyn Ayala RN  [facility] Nurse Advisor  4/29/2022 at 11:22 AM            "

## 2022-04-29 NOTE — TELEPHONE ENCOUNTER
Attempted to call the patient but unable to get through. Unclear why. No dial tone. Will try again on Monday.

## 2022-05-02 RX ORDER — SENNA AND DOCUSATE SODIUM 50; 8.6 MG/1; MG/1
1 TABLET, FILM COATED ORAL AT BEDTIME
Qty: 90 TABLET | Refills: 1 | Status: SHIPPED | OUTPATIENT
Start: 2022-05-02

## 2022-05-02 NOTE — TELEPHONE ENCOUNTER
Hi Dr. Jarquin,     I updated patient's phone number. Her daughter Lesli's phone number is the only number she has currently. [phone]. I also assisted Lesli with scheduling her mother's ECHO.     Thanks,     Dave Myhre, RN  [facility] RN

## 2022-05-02 NOTE — TELEPHONE ENCOUNTER
Still feels that the urine is infected   Reports it is dark yellow with intermittent red ting   No fevers, chills, nausea, vomiting   Is constipated and not hydrated   Not in any obvious distress   Daughter still feels like the urine is still infected and patient is weak.     Reviewed UCx and no evidence of infection     If patient having hematuria, will send to urology.     Started on senna + increase hydration for constipation.

## 2022-05-06 ENCOUNTER — NURSE TRIAGE (OUTPATIENT)
Dept: NURSING | Facility: CLINIC | Age: 87
End: 2022-05-06

## 2022-05-06 NOTE — TELEPHONE ENCOUNTER
"Clinic Action Needed:FYI  Reason for Call:    Daughter Shannon calling (consent to communicate) on file.    Lesli stating something was supposed to be sent for \"constipation\" stating she picked up \"big yellow pills from the pharmacy and they said it wasn't for constipation.\" Caller is questioning if prescription was sent for constipation?    Reviewed prescription for senna sent 5/2/22.  SENNA-docusate sodium (SENNA S) 8.6-50 MG tablet 90 tablet 1 5/2/2022  --   Sig - Route: Take 1 tablet by mouth At Bedtime - Oral     Lesli stating she did not know what was picked up stating she does not have the bottle there.     Offered to contact pharmacy with caller. Placed call to [facility] stating they are closed until 2 pm.     Caller disconnected or hung up. Attempted to reach patient back at [phone] (home), no answer and no voice mail picked up.      Reason for Disposition    Caller requesting a NON-URGENT new prescription or refill and triager unable to refill per department policy    Additional Information    Negative: Drug overdose and triager unable to answer question    Negative: Caller requesting information unrelated to medicine    Negative: Caller requesting a prescription for Strep throat and has a positive culture result    Negative: Rash while taking a medication or within 3 days of stopping it    Negative: Immunization reaction suspected    Negative: Asthma and having symptoms of asthma (cough, wheezing, etc.)    Negative: Breastfeeding questions about mother's medicines and diet    Negative: MORE THAN A DOUBLE DOSE of a prescription or over-the-counter (OTC) drug    Negative: DOUBLE DOSE (an extra dose or lesser amount) of over-the-counter (OTC) drug and any symptoms (e.g., dizziness, nausea, pain, sleepiness)    Negative: DOUBLE DOSE (an extra dose or lesser amount) of prescription drug and any symptoms (e.g., dizziness, nausea, pain, sleepiness)    Negative: Took another person's prescription " drug    Negative: DOUBLE DOSE (an extra dose or lesser amount) of prescription drug and NO symptoms (Exception: a double dose of antibiotics)    Negative: Diabetes drug error or overdose (e.g., took wrong type of insulin or took extra dose)    Negative: Caller has medication question about med not prescribed by PCP and triager unable to answer question (e.g., compatibility with other med, storage)    Negative: Request for URGENT new prescription or refill of 'essential' medication (i.e., likelihood of harm to patient if not taken) and triager unable to fill per department policy    Negative: Prescription not at pharmacy and was prescribed today by PCP    Negative: Pharmacy calling with prescription questions and triager unable to answer question    Negative: Caller has urgent medication question about med that PCP prescribed and triager unable to answer question    Negative: Caller has NON-URGENT medication question about med that PCP prescribed and triager unable to answer question    Protocols used: MEDICATION QUESTION CALL-A-[place]    Collette Neely RN  [facility]

## 2022-05-09 NOTE — TELEPHONE ENCOUNTER
Attempted to return call to Lesli, no answer and unable to leave message.    Please relay information if call is return, thank you.

## 2022-05-12 ENCOUNTER — HOSPITAL ENCOUNTER (OUTPATIENT)
Dept: CARDIOLOGY | Facility: CLINIC | Age: 87
Discharge: HOME | End: 2022-05-12
Attending: STUDENT IN AN ORGANIZED HEALTH CARE EDUCATION/TRAINING PROGRAM | Admitting: STUDENT IN AN ORGANIZED HEALTH CARE EDUCATION/TRAINING PROGRAM
Payer: COMMERCIAL

## 2022-05-12 DIAGNOSIS — R01.1 HEART MURMUR: ICD-10-CM

## 2022-05-12 DIAGNOSIS — M62.81 GENERALIZED MUSCLE WEAKNESS: ICD-10-CM

## 2022-05-12 DIAGNOSIS — R29.6 FALLS FREQUENTLY: ICD-10-CM

## 2022-05-12 LAB — LVEF ECHO: NORMAL

## 2022-05-12 PROCEDURE — 93306 TTE W/DOPPLER COMPLETE: CPT | Mod: 26 | Performed by: INTERNAL MEDICINE

## 2022-05-12 PROCEDURE — 93306 TTE W/DOPPLER COMPLETE: CPT

## 2022-06-02 DIAGNOSIS — F33.2 SEVERE EPISODE OF RECURRENT MAJOR DEPRESSIVE DISORDER, WITHOUT PSYCHOTIC FEATURES (H): ICD-10-CM

## 2022-06-02 DIAGNOSIS — E87.6 HYPOKALEMIA: ICD-10-CM

## 2022-06-03 RX ORDER — SERTRALINE HYDROCHLORIDE 25 MG/1
25 TABLET, FILM COATED ORAL DAILY
Qty: 60 TABLET | Refills: 0 | Status: SHIPPED | OUTPATIENT
Start: 2022-06-03

## 2022-06-03 RX ORDER — POTASSIUM CHLORIDE 750 MG/1
TABLET, EXTENDED RELEASE ORAL
Qty: 270 TABLET | Refills: 2 | Status: SHIPPED | OUTPATIENT
Start: 2022-06-03

## 2022-06-03 NOTE — TELEPHONE ENCOUNTER
"Routing refill request to provider for review/approval because:  [id number]    Last Written Prescription Date:  4/25/22  Last Fill Quantity: 60,  # refills: 0   Last office visit provider:  4/25/22     Requested Prescriptions   Pending Prescriptions Disp Refills     sertraline (ZOLOFT) 25 MG tablet 60 tablet 0     Sig: Take 1 tablet (25 mg) by mouth daily       SSRIs Protocol Failed - 6/3/2022  8:42 AM        Failed - PHQ-9 score less than 5 in past 6 months     Please review last PHQ-9 score.           Failed - Recent (6 mo) or future (30 days) visit within the authorizing provider's specialty     Patient had office visit in the last 6 months or has a visit in the next 30 days with authorizing provider or within the authorizing provider's specialty.  See \"Patient Info\" tab in inbasket, or \"Choose Columns\" in Meds & Orders section of the refill encounter.            Passed - Medication is active on med list        Passed - Patient is age 18 or older        Passed - No active pregnancy on record        Passed - No positive pregnancy test in last 12 months             Claudia Cesar, RN 06/03/22 6:15 PM  "

## 2022-06-04 NOTE — TELEPHONE ENCOUNTER
"Last Written Prescription Date:  4/29/22  Last Fill Quantity: 90,  # refills: 1   Last office visit provider:  4/25/22     Requested Prescriptions   Pending Prescriptions Disp Refills     potassium chloride ER (K-TAB/KLOR-CON) 10 MEQ CR tablet [Pharmacy Med Name: Potassium Chloride ER 10 MEQ Oral Tablet Extended Release] 90 tablet 0     Sig: Take 3 tablets by mouth once daily       Potassium Supplements Protocol Passed - 6/2/2022  1:23 PM        Passed - Recent (12 mo) or future (30 days) visit within the authorizing provider's department     Patient has had an office visit with the authorizing provider or a provider within the authorizing providers department within the previous 12 mos or has a future within next 30 days. See \"Patient Info\" tab in inbasket, or \"Choose Columns\" in Meds & Orders section of the refill encounter.              Passed - Medication is active on med list        Passed - Patient is age 18 or older        Passed - Normal serum potassium in past 12 months     Recent Labs   Lab Test 04/25/22  1007   POTASSIUM 4.6                         Smitha White 06/03/22 7:51 PM  "

## 2022-09-23 ENCOUNTER — TELEPHONE (OUTPATIENT)
Dept: FAMILY MEDICINE | Facility: CLINIC | Age: 87
End: 2022-09-23

## 2022-09-23 NOTE — TELEPHONE ENCOUNTER
Called pt, pt johnnaugther states pt needs to be seen sooner. rgaring to frewuent falls, cough and several other complications. Pt dauther states she is 89 and need to be seen soner.   Only willing to set up appt with jey khanna isnt here today to get aproval reg for earlier appt. If get the chance move apt sooner.

## 2022-09-27 NOTE — TELEPHONE ENCOUNTER
Called and spoke with Lesli. Lesli states she would like to be seen together with Nimo.     Informed this would be sent to PCP to decide.      Lesli verbalizes understanding.

## 2022-11-04 ENCOUNTER — TELEPHONE (OUTPATIENT)
Dept: CARE COORDINATION | Facility: CLINIC | Age: 87
End: 2022-11-04

## 2022-11-04 DIAGNOSIS — R62.7 FAILURE TO THRIVE IN ADULT: Primary | ICD-10-CM

## 2022-11-04 NOTE — TELEPHONE ENCOUNTER
Inocencio Jarquin,     I spoke with patient's daughter Lesli today. You have seen Nimo and her Lesli Jamey Peres in the past. Lesli feels her mother Nimo would benefit from hospice at this time. Would you be willing to put in an order for hospice for her (Nimo)? Dr. Waggoner is listed as Nimo's PCP, but her daughter Lesli insist you are their PCP. Please advise.       Thanks,     Dave Myhre, RN  [facility] RN

## 2022-11-08 NOTE — TELEPHONE ENCOUNTER
Inocencio Jarquin,     Lesli, patient's daughter, said she was getting weaker, but was eating, drinking and able to make her needs known. I offered to call for paramedics on Friday when I spoke with Lesli, but she declined. She stated he mother was not interested in going to a hospital to be evaluated. Lesli just said her mother was getting weaker and she needed more support at home. I have actually never met the patient, only her daughter. Yesterday I received a call from Lesli. She said a welfare check was done on her mom over the weekend and she was taken to [facility]. I am unable to see any notes from [facility] as to how she is doing. I will continue to follow up with Lesli again today to see if I can get anymore information on her status.     Thanks,       Dave Myhre, RN  [facility] RN

## 2022-12-05 ENCOUNTER — LAB REQUISITION (OUTPATIENT)
Dept: LAB | Facility: CLINIC | Age: 87
End: 2022-12-05
Payer: COMMERCIAL

## 2022-12-05 DIAGNOSIS — R91.1 SOLITARY PULMONARY NODULE: ICD-10-CM

## 2022-12-06 LAB
ALBUMIN SERPL BCG-MCNC: 3.4 G/DL (ref 3.5–5.2)
ALP SERPL-CCNC: 82 U/L (ref 35–104)
ALT SERPL W P-5'-P-CCNC: 8 U/L (ref 10–35)
ANION GAP SERPL CALCULATED.3IONS-SCNC: 11 MMOL/L (ref 7–15)
AST SERPL W P-5'-P-CCNC: 16 U/L (ref 10–35)
BILIRUB SERPL-MCNC: 0.5 MG/DL
BUN SERPL-MCNC: 18.8 MG/DL (ref 8–23)
CALCIUM SERPL-MCNC: 9.1 MG/DL (ref 8.2–9.6)
CHLORIDE SERPL-SCNC: 106 MMOL/L (ref 98–107)
CREAT SERPL-MCNC: 0.62 MG/DL (ref 0.51–0.95)
DEPRECATED HCO3 PLAS-SCNC: 23 MMOL/L (ref 22–29)
ERYTHROCYTE [DISTWIDTH] IN BLOOD BY AUTOMATED COUNT: 14 % (ref 10–15)
GFR SERPL CREATININE-BSD FRML MDRD: 83 ML/MIN/1.73M2
GLUCOSE SERPL-MCNC: 89 MG/DL (ref 70–99)
HCT VFR BLD AUTO: 41.3 % (ref 35–47)
HGB BLD-MCNC: 13.1 G/DL (ref 11.7–15.7)
MCH RBC QN AUTO: 30.5 PG (ref 26.5–33)
MCHC RBC AUTO-ENTMCNC: 31.7 G/DL (ref 31.5–36.5)
MCV RBC AUTO: 96 FL (ref 78–100)
PLATELET # BLD AUTO: 205 10E3/UL (ref 150–450)
POTASSIUM SERPL-SCNC: 4.6 MMOL/L (ref 3.4–5.3)
PROT SERPL-MCNC: 6 G/DL (ref 6.4–8.3)
RBC # BLD AUTO: 4.3 10E6/UL (ref 3.8–5.2)
SODIUM SERPL-SCNC: 140 MMOL/L (ref 136–145)
WBC # BLD AUTO: 9.7 10E3/UL (ref 4–11)

## 2022-12-06 PROCEDURE — 36415 COLL VENOUS BLD VENIPUNCTURE: CPT | Performed by: NURSE PRACTITIONER

## 2022-12-06 PROCEDURE — 80053 COMPREHEN METABOLIC PANEL: CPT | Performed by: NURSE PRACTITIONER

## 2022-12-06 PROCEDURE — 82040 ASSAY OF SERUM ALBUMIN: CPT | Performed by: NURSE PRACTITIONER

## 2022-12-06 PROCEDURE — P9604 ONE-WAY ALLOW PRORATED TRIP: HCPCS | Performed by: NURSE PRACTITIONER

## 2022-12-06 PROCEDURE — 85048 AUTOMATED LEUKOCYTE COUNT: CPT | Performed by: NURSE PRACTITIONER

## 2023-04-07 ENCOUNTER — LAB REQUISITION (OUTPATIENT)
Dept: LAB | Facility: CLINIC | Age: 88
End: 2023-04-07
Payer: COMMERCIAL

## 2023-04-07 DIAGNOSIS — E87.6 HYPOKALEMIA: ICD-10-CM

## 2023-04-10 LAB
ANION GAP SERPL CALCULATED.3IONS-SCNC: 13 MMOL/L (ref 7–15)
BUN SERPL-MCNC: 20.6 MG/DL (ref 8–23)
CALCIUM SERPL-MCNC: 9 MG/DL (ref 8.2–9.6)
CHLORIDE SERPL-SCNC: 105 MMOL/L (ref 98–107)
CREAT SERPL-MCNC: 0.65 MG/DL (ref 0.51–0.95)
DEPRECATED HCO3 PLAS-SCNC: 23 MMOL/L (ref 22–29)
GFR SERPL CREATININE-BSD FRML MDRD: 82 ML/MIN/1.73M2
GLUCOSE SERPL-MCNC: 95 MG/DL (ref 70–99)
MAGNESIUM SERPL-MCNC: 2.1 MG/DL (ref 1.7–2.3)
POTASSIUM SERPL-SCNC: 4.4 MMOL/L (ref 3.4–5.3)
SODIUM SERPL-SCNC: 141 MMOL/L (ref 136–145)
T4 FREE SERPL-MCNC: 1.22 NG/DL (ref 0.9–1.7)
TSH SERPL DL<=0.005 MIU/L-ACNC: 0.93 UIU/ML (ref 0.3–4.2)

## 2023-04-10 PROCEDURE — 83735 ASSAY OF MAGNESIUM: CPT | Mod: ORL | Performed by: INTERNAL MEDICINE

## 2023-04-10 PROCEDURE — 84439 ASSAY OF FREE THYROXINE: CPT | Mod: ORL | Performed by: INTERNAL MEDICINE

## 2023-04-10 PROCEDURE — 36415 COLL VENOUS BLD VENIPUNCTURE: CPT | Mod: ORL | Performed by: INTERNAL MEDICINE

## 2023-04-10 PROCEDURE — 80048 BASIC METABOLIC PNL TOTAL CA: CPT | Mod: ORL | Performed by: INTERNAL MEDICINE

## 2023-04-10 PROCEDURE — 84443 ASSAY THYROID STIM HORMONE: CPT | Mod: ORL | Performed by: INTERNAL MEDICINE

## 2023-04-10 PROCEDURE — P9604 ONE-WAY ALLOW PRORATED TRIP: HCPCS | Mod: ORL | Performed by: INTERNAL MEDICINE

## 2023-05-02 ENCOUNTER — LAB REQUISITION (OUTPATIENT)
Dept: LAB | Facility: CLINIC | Age: 88
End: 2023-05-02
Payer: COMMERCIAL

## 2023-05-02 DIAGNOSIS — R41.89 OTHER SYMPTOMS AND SIGNS INVOLVING COGNITIVE FUNCTIONS AND AWARENESS: ICD-10-CM

## 2023-05-04 LAB
BASOPHILS # BLD AUTO: 0.1 10E3/UL (ref 0–0.2)
BASOPHILS NFR BLD AUTO: 1 %
CRP SERPL-MCNC: 5.24 MG/L
DEPRECATED CALCIDIOL+CALCIFEROL SERPL-MC: 30 UG/L (ref 20–75)
EOSINOPHIL # BLD AUTO: 0.2 10E3/UL (ref 0–0.7)
EOSINOPHIL NFR BLD AUTO: 2 %
ERYTHROCYTE [DISTWIDTH] IN BLOOD BY AUTOMATED COUNT: 14 % (ref 10–15)
ERYTHROCYTE [SEDIMENTATION RATE] IN BLOOD BY WESTERGREN METHOD: 15 MM/HR (ref 0–30)
HCT VFR BLD AUTO: 45.9 % (ref 35–47)
HGB BLD-MCNC: 14.4 G/DL (ref 11.7–15.7)
IMM GRANULOCYTES # BLD: 0.1 10E3/UL
IMM GRANULOCYTES NFR BLD: 1 %
LYMPHOCYTES # BLD AUTO: 2.3 10E3/UL (ref 0.8–5.3)
LYMPHOCYTES NFR BLD AUTO: 23 %
MCH RBC QN AUTO: 30.2 PG (ref 26.5–33)
MCHC RBC AUTO-ENTMCNC: 31.4 G/DL (ref 31.5–36.5)
MCV RBC AUTO: 96 FL (ref 78–100)
MONOCYTES # BLD AUTO: 1 10E3/UL (ref 0–1.3)
MONOCYTES NFR BLD AUTO: 10 %
NEUTROPHILS # BLD AUTO: 6.4 10E3/UL (ref 1.6–8.3)
NEUTROPHILS NFR BLD AUTO: 63 %
NRBC # BLD AUTO: 0 10E3/UL
NRBC BLD AUTO-RTO: 0 /100
PLAT MORPH BLD: NORMAL
PLATELET # BLD AUTO: 189 10E3/UL (ref 150–450)
RBC # BLD AUTO: 4.77 10E6/UL (ref 3.8–5.2)
RBC MORPH BLD: NORMAL
TSH SERPL DL<=0.005 MIU/L-ACNC: 0.7 UIU/ML (ref 0.3–4.2)
VIT B12 SERPL-MCNC: 348 PG/ML (ref 232–1245)
WBC # BLD AUTO: 10 10E3/UL (ref 4–11)

## 2023-05-04 PROCEDURE — 36415 COLL VENOUS BLD VENIPUNCTURE: CPT | Mod: ORL | Performed by: NURSE PRACTITIONER

## 2023-05-04 PROCEDURE — 82306 VITAMIN D 25 HYDROXY: CPT | Mod: ORL | Performed by: NURSE PRACTITIONER

## 2023-05-04 PROCEDURE — 86140 C-REACTIVE PROTEIN: CPT | Mod: ORL | Performed by: NURSE PRACTITIONER

## 2023-05-04 PROCEDURE — P9604 ONE-WAY ALLOW PRORATED TRIP: HCPCS | Mod: ORL | Performed by: NURSE PRACTITIONER

## 2023-05-04 PROCEDURE — 85652 RBC SED RATE AUTOMATED: CPT | Mod: ORL | Performed by: NURSE PRACTITIONER

## 2023-05-04 PROCEDURE — 82607 VITAMIN B-12: CPT | Mod: ORL | Performed by: NURSE PRACTITIONER

## 2023-05-04 PROCEDURE — 85025 COMPLETE CBC W/AUTO DIFF WBC: CPT | Mod: ORL | Performed by: NURSE PRACTITIONER

## 2023-05-04 PROCEDURE — 84443 ASSAY THYROID STIM HORMONE: CPT | Mod: ORL | Performed by: NURSE PRACTITIONER

## 2023-05-17 ENCOUNTER — LAB REQUISITION (OUTPATIENT)
Dept: LAB | Facility: CLINIC | Age: 88
End: 2023-05-17
Payer: COMMERCIAL

## 2023-05-17 DIAGNOSIS — R11.0 NAUSEA: ICD-10-CM

## 2023-05-18 LAB
BASOPHILS # BLD AUTO: 0.1 10E3/UL (ref 0–0.2)
BASOPHILS NFR BLD AUTO: 1 %
EOSINOPHIL # BLD AUTO: 0.3 10E3/UL (ref 0–0.7)
EOSINOPHIL NFR BLD AUTO: 3 %
ERYTHROCYTE [DISTWIDTH] IN BLOOD BY AUTOMATED COUNT: 14 % (ref 10–15)
HCT VFR BLD AUTO: 43.9 % (ref 35–47)
HGB BLD-MCNC: 13.6 G/DL (ref 11.7–15.7)
IMM GRANULOCYTES # BLD: 0.1 10E3/UL
IMM GRANULOCYTES NFR BLD: 1 %
LYMPHOCYTES # BLD AUTO: 2.6 10E3/UL (ref 0.8–5.3)
LYMPHOCYTES NFR BLD AUTO: 25 %
MCH RBC QN AUTO: 30 PG (ref 26.5–33)
MCHC RBC AUTO-ENTMCNC: 31 G/DL (ref 31.5–36.5)
MCV RBC AUTO: 97 FL (ref 78–100)
MONOCYTES # BLD AUTO: 1 10E3/UL (ref 0–1.3)
MONOCYTES NFR BLD AUTO: 10 %
NEUTROPHILS # BLD AUTO: 6.5 10E3/UL (ref 1.6–8.3)
NEUTROPHILS NFR BLD AUTO: 60 %
NRBC # BLD AUTO: 0 10E3/UL
NRBC BLD AUTO-RTO: 0 /100
PLATELET # BLD AUTO: 178 10E3/UL (ref 150–450)
RBC # BLD AUTO: 4.53 10E6/UL (ref 3.8–5.2)
WBC # BLD AUTO: 10.5 10E3/UL (ref 4–11)

## 2023-05-18 PROCEDURE — 36415 COLL VENOUS BLD VENIPUNCTURE: CPT | Mod: ORL | Performed by: NURSE PRACTITIONER

## 2023-05-18 PROCEDURE — P9604 ONE-WAY ALLOW PRORATED TRIP: HCPCS | Mod: ORL | Performed by: NURSE PRACTITIONER

## 2023-05-18 PROCEDURE — 85025 COMPLETE CBC W/AUTO DIFF WBC: CPT | Mod: ORL | Performed by: NURSE PRACTITIONER

## 2023-06-03 ENCOUNTER — LAB REQUISITION (OUTPATIENT)
Dept: LAB | Facility: CLINIC | Age: 88
End: 2023-06-03
Payer: COMMERCIAL

## 2023-06-03 DIAGNOSIS — E87.6 HYPOKALEMIA: ICD-10-CM

## 2023-06-05 LAB
ANION GAP SERPL CALCULATED.3IONS-SCNC: 11 MMOL/L (ref 7–15)
BUN SERPL-MCNC: 22.4 MG/DL (ref 8–23)
CALCIUM SERPL-MCNC: 8.8 MG/DL (ref 8.2–9.6)
CHLORIDE SERPL-SCNC: 105 MMOL/L (ref 98–107)
CREAT SERPL-MCNC: 0.53 MG/DL (ref 0.51–0.95)
DEPRECATED HCO3 PLAS-SCNC: 23 MMOL/L (ref 22–29)
GFR SERPL CREATININE-BSD FRML MDRD: 86 ML/MIN/1.73M2
GLUCOSE SERPL-MCNC: 92 MG/DL (ref 70–99)
POTASSIUM SERPL-SCNC: 3.5 MMOL/L (ref 3.4–5.3)
SODIUM SERPL-SCNC: 139 MMOL/L (ref 136–145)

## 2023-06-05 PROCEDURE — P9604 ONE-WAY ALLOW PRORATED TRIP: HCPCS | Mod: ORL | Performed by: FAMILY MEDICINE

## 2023-06-05 PROCEDURE — 36415 COLL VENOUS BLD VENIPUNCTURE: CPT | Mod: ORL | Performed by: FAMILY MEDICINE

## 2023-06-05 PROCEDURE — 80048 BASIC METABOLIC PNL TOTAL CA: CPT | Mod: ORL | Performed by: FAMILY MEDICINE

## 2023-06-08 ENCOUNTER — LAB REQUISITION (OUTPATIENT)
Dept: LAB | Facility: CLINIC | Age: 88
End: 2023-06-08
Payer: COMMERCIAL

## 2023-06-08 DIAGNOSIS — E87.6 HYPOKALEMIA: ICD-10-CM

## 2023-06-10 LAB — POTASSIUM SERPL-SCNC: 4.1 MMOL/L (ref 3.4–5.3)

## 2023-06-10 PROCEDURE — 36415 COLL VENOUS BLD VENIPUNCTURE: CPT | Mod: ORL

## 2023-06-10 PROCEDURE — 84132 ASSAY OF SERUM POTASSIUM: CPT | Mod: ORL

## 2024-01-29 ENCOUNTER — LAB REQUISITION (OUTPATIENT)
Dept: LAB | Facility: CLINIC | Age: 89
End: 2024-01-29
Payer: COMMERCIAL

## 2024-01-29 DIAGNOSIS — R54 AGE-RELATED PHYSICAL DEBILITY: ICD-10-CM

## 2024-01-30 LAB
ANION GAP SERPL CALCULATED.3IONS-SCNC: 10 MMOL/L (ref 7–15)
BASOPHILS # BLD AUTO: 0 10E3/UL (ref 0–0.2)
BASOPHILS NFR BLD AUTO: 1 %
BUN SERPL-MCNC: 20.5 MG/DL (ref 8–23)
CALCIUM SERPL-MCNC: 8.6 MG/DL (ref 8.2–9.6)
CHLORIDE SERPL-SCNC: 107 MMOL/L (ref 98–107)
CREAT SERPL-MCNC: 0.4 MG/DL (ref 0.51–0.95)
DEPRECATED HCO3 PLAS-SCNC: 24 MMOL/L (ref 22–29)
EGFRCR SERPLBLD CKD-EPI 2021: >90 ML/MIN/1.73M2
EOSINOPHIL # BLD AUTO: 0.2 10E3/UL (ref 0–0.7)
EOSINOPHIL NFR BLD AUTO: 3 %
ERYTHROCYTE [DISTWIDTH] IN BLOOD BY AUTOMATED COUNT: 15.3 % (ref 10–15)
GLUCOSE SERPL-MCNC: 78 MG/DL (ref 70–99)
HCT VFR BLD AUTO: 36.4 % (ref 35–47)
HGB BLD-MCNC: 11.8 G/DL (ref 11.7–15.7)
IMM GRANULOCYTES # BLD: 0 10E3/UL
IMM GRANULOCYTES NFR BLD: 0 %
LYMPHOCYTES # BLD AUTO: 2.6 10E3/UL (ref 0.8–5.3)
LYMPHOCYTES NFR BLD AUTO: 30 %
MCH RBC QN AUTO: 30.4 PG (ref 26.5–33)
MCHC RBC AUTO-ENTMCNC: 32.4 G/DL (ref 31.5–36.5)
MCV RBC AUTO: 94 FL (ref 78–100)
MONOCYTES # BLD AUTO: 0.9 10E3/UL (ref 0–1.3)
MONOCYTES NFR BLD AUTO: 10 %
NEUTROPHILS # BLD AUTO: 4.9 10E3/UL (ref 1.6–8.3)
NEUTROPHILS NFR BLD AUTO: 56 %
NRBC # BLD AUTO: 0 10E3/UL
NRBC BLD AUTO-RTO: 0 /100
PLATELET # BLD AUTO: 179 10E3/UL (ref 150–450)
POTASSIUM SERPL-SCNC: 4 MMOL/L (ref 3.4–5.3)
RBC # BLD AUTO: 3.88 10E6/UL (ref 3.8–5.2)
SODIUM SERPL-SCNC: 141 MMOL/L (ref 135–145)
TSH SERPL DL<=0.005 MIU/L-ACNC: 1.43 UIU/ML (ref 0.3–4.2)
WBC # BLD AUTO: 8.6 10E3/UL (ref 4–11)

## 2024-01-30 PROCEDURE — 85025 COMPLETE CBC W/AUTO DIFF WBC: CPT | Mod: ORL | Performed by: FAMILY MEDICINE

## 2024-01-30 PROCEDURE — 80048 BASIC METABOLIC PNL TOTAL CA: CPT | Mod: ORL | Performed by: FAMILY MEDICINE

## 2024-01-30 PROCEDURE — P9604 ONE-WAY ALLOW PRORATED TRIP: HCPCS | Mod: ORL | Performed by: FAMILY MEDICINE

## 2024-01-30 PROCEDURE — 84443 ASSAY THYROID STIM HORMONE: CPT | Mod: ORL | Performed by: FAMILY MEDICINE

## 2024-01-30 PROCEDURE — 36415 COLL VENOUS BLD VENIPUNCTURE: CPT | Mod: ORL | Performed by: FAMILY MEDICINE

## 2024-03-14 ENCOUNTER — LAB REQUISITION (OUTPATIENT)
Dept: LAB | Facility: CLINIC | Age: 89
End: 2024-03-14
Payer: COMMERCIAL

## 2024-03-14 DIAGNOSIS — E03.9 HYPOTHYROIDISM, UNSPECIFIED: ICD-10-CM

## 2024-03-15 LAB
ANION GAP SERPL CALCULATED.3IONS-SCNC: 11 MMOL/L (ref 7–15)
BASOPHILS # BLD AUTO: 0.1 10E3/UL (ref 0–0.2)
BASOPHILS NFR BLD AUTO: 1 %
BUN SERPL-MCNC: 19.1 MG/DL (ref 8–23)
CALCIUM SERPL-MCNC: 9.1 MG/DL (ref 8.2–9.6)
CHLORIDE SERPL-SCNC: 104 MMOL/L (ref 98–107)
CREAT SERPL-MCNC: 0.43 MG/DL (ref 0.51–0.95)
DEPRECATED HCO3 PLAS-SCNC: 24 MMOL/L (ref 22–29)
EGFRCR SERPLBLD CKD-EPI 2021: 90 ML/MIN/1.73M2
EOSINOPHIL # BLD AUTO: 0.1 10E3/UL (ref 0–0.7)
EOSINOPHIL NFR BLD AUTO: 1 %
ERYTHROCYTE [DISTWIDTH] IN BLOOD BY AUTOMATED COUNT: 14.6 % (ref 10–15)
GLUCOSE SERPL-MCNC: 72 MG/DL (ref 70–99)
HCT VFR BLD AUTO: 40.4 % (ref 35–47)
HGB BLD-MCNC: 13.3 G/DL (ref 11.7–15.7)
IMM GRANULOCYTES # BLD: 0 10E3/UL
IMM GRANULOCYTES NFR BLD: 0 %
LYMPHOCYTES # BLD AUTO: 2.3 10E3/UL (ref 0.8–5.3)
LYMPHOCYTES NFR BLD AUTO: 33 %
MCH RBC QN AUTO: 31 PG (ref 26.5–33)
MCHC RBC AUTO-ENTMCNC: 32.9 G/DL (ref 31.5–36.5)
MCV RBC AUTO: 94 FL (ref 78–100)
MONOCYTES # BLD AUTO: 0.9 10E3/UL (ref 0–1.3)
MONOCYTES NFR BLD AUTO: 13 %
NEUTROPHILS # BLD AUTO: 3.6 10E3/UL (ref 1.6–8.3)
NEUTROPHILS NFR BLD AUTO: 52 %
NRBC # BLD AUTO: 0 10E3/UL
NRBC BLD AUTO-RTO: 0 /100
PLATELET # BLD AUTO: 135 10E3/UL (ref 150–450)
POTASSIUM SERPL-SCNC: 3.9 MMOL/L (ref 3.4–5.3)
RBC # BLD AUTO: 4.29 10E6/UL (ref 3.8–5.2)
SODIUM SERPL-SCNC: 139 MMOL/L (ref 135–145)
T4 FREE SERPL-MCNC: 1.18 NG/DL (ref 0.9–1.7)
WBC # BLD AUTO: 7 10E3/UL (ref 4–11)

## 2024-03-15 PROCEDURE — P9604 ONE-WAY ALLOW PRORATED TRIP: HCPCS | Mod: ORL

## 2024-03-15 PROCEDURE — 84439 ASSAY OF FREE THYROXINE: CPT | Mod: ORL

## 2024-03-15 PROCEDURE — 85025 COMPLETE CBC W/AUTO DIFF WBC: CPT | Mod: ORL

## 2024-03-15 PROCEDURE — 36415 COLL VENOUS BLD VENIPUNCTURE: CPT | Mod: ORL

## 2024-03-15 PROCEDURE — 80048 BASIC METABOLIC PNL TOTAL CA: CPT | Mod: ORL

## 2024-08-15 ENCOUNTER — LAB REQUISITION (OUTPATIENT)
Dept: LAB | Facility: CLINIC | Age: 89
End: 2024-08-15
Payer: OTHER MISCELLANEOUS

## 2024-08-15 DIAGNOSIS — N39.0 URINARY TRACT INFECTION, SITE NOT SPECIFIED: ICD-10-CM

## 2024-08-15 LAB
ALBUMIN UR-MCNC: 30 MG/DL
APPEARANCE UR: ABNORMAL
BACTERIA #/AREA URNS HPF: ABNORMAL /HPF
BILIRUB UR QL STRIP: NEGATIVE
COLOR UR AUTO: ABNORMAL
GLUCOSE UR STRIP-MCNC: NEGATIVE MG/DL
HGB UR QL STRIP: ABNORMAL
KETONES UR STRIP-MCNC: NEGATIVE MG/DL
LEUKOCYTE ESTERASE UR QL STRIP: ABNORMAL
MUCOUS THREADS #/AREA URNS LPF: PRESENT /LPF
NITRATE UR QL: NEGATIVE
PH UR STRIP: 6.5 [PH] (ref 5–7)
RBC URINE: 10 /HPF
SP GR UR STRIP: 1.01 (ref 1–1.03)
UROBILINOGEN UR STRIP-MCNC: NORMAL MG/DL
WBC CLUMPS #/AREA URNS HPF: PRESENT /HPF
WBC URINE: >182 /HPF

## 2024-08-15 PROCEDURE — 87186 SC STD MICRODIL/AGAR DIL: CPT | Mod: ORL | Performed by: FAMILY MEDICINE

## 2024-08-15 PROCEDURE — 81001 URINALYSIS AUTO W/SCOPE: CPT | Mod: ORL | Performed by: FAMILY MEDICINE

## 2024-08-18 LAB
BACTERIA UR CULT: ABNORMAL
BACTERIA UR CULT: ABNORMAL